# Patient Record
Sex: FEMALE | Race: WHITE | NOT HISPANIC OR LATINO | URBAN - METROPOLITAN AREA
[De-identification: names, ages, dates, MRNs, and addresses within clinical notes are randomized per-mention and may not be internally consistent; named-entity substitution may affect disease eponyms.]

---

## 2023-09-28 ENCOUNTER — HOSPITAL ENCOUNTER (OUTPATIENT)
Dept: DATA CONVERSION | Facility: HOSPITAL | Age: 78
Discharge: HOME | End: 2023-09-28

## 2023-09-28 DIAGNOSIS — Z12.31 ENCOUNTER FOR SCREENING MAMMOGRAM FOR MALIGNANT NEOPLASM OF BREAST: ICD-10-CM

## 2024-08-02 ENCOUNTER — HOSPITAL ENCOUNTER (OUTPATIENT)
Dept: RADIOLOGY | Facility: HOSPITAL | Age: 79
Discharge: HOME | End: 2024-08-02
Payer: COMMERCIAL

## 2024-08-02 VITALS — HEIGHT: 59 IN | BODY MASS INDEX: 26.41 KG/M2 | WEIGHT: 131 LBS

## 2024-08-02 DIAGNOSIS — N63.11 UNSPECIFIED LUMP IN THE RIGHT BREAST, UPPER OUTER QUADRANT: ICD-10-CM

## 2024-08-02 PROCEDURE — 77062 BREAST TOMOSYNTHESIS BI: CPT

## 2024-08-02 PROCEDURE — 76642 ULTRASOUND BREAST LIMITED: CPT | Mod: 50

## 2024-08-02 PROCEDURE — 76983 USE EA ADDL TARGET LESION: CPT

## 2024-08-07 NOTE — PROGRESS NOTES
Subjective   Patient ID: Ayana Hernandez is a 79 y.o. female who presents for breast biopsy consult 8/8/2024.  HPI  Patient is new to clinic and presents for Bilateral US guided breast biopsy 8/8/2024.  Patient indicates she had a palpable lump in her right breast for years.  Recently however she thinks it is increased in size.  She noticed something in the mirror when she looked at her breast and subsequently called.  Patient did not feel any lumps in her left breast until after the imaging.  Patient has a history of a right breast biopsy which sounds like an excisional biopsy in the year 2000 for benign disease.  Patient has a history of left breast abscess in 1993 that was taken care of as well.  Patient denies any nipple discharge.  Patient had a maternal great grandmother had ovarian cancer in her 80s.  BI MAMMO BILATERAL DIAGNOSTIC TOMOSYNTHESIS; BI US BREAST LIMITED  BILATERAL;  8/2/2024 1:06 pm; 8/2/2024 2:26 pm      ACCESSION NUMBER(S):  [id number]; [id number]      ORDERING CLINICIAN:  CARMEN BARBER      INDICATION:  Signs/Symptoms:DIAG BILAT MAMMO W ZOHRA; Signs/Symptoms:US BREAST.  Palpable lump right breast upper-outer quadrant. Area of concern is  demarcated with an external skin marker.      COMPARISON:  2023, 2022, 2021, 2020, 2018      FINDINGS:  2D and tomosynthesis images were reviewed at 1 mm slice thickness.      Density:  The breast tissue is heterogeneously dense, which may  obscure small masses.      Right Breast :  *There is a subtle area of architectural distortion corresponding  with the palpable area of concern at the 12 o'clock position right  breast anterior depth.          Left Breast  :  *Coarse benign calcifications are scattered throughout the left  breast. There is a developing cluster of coarse calcifications within  the comp of dense tissue upper-outer quadrant left breast mid depth.  There is questionable increasing tissue density in this distribution  as well.               ULTRASOUND FINDINGS:  Patient was scanned both by the technologist as well as myself.  Right Breast :  *The area of palpable concern corresponds with an taller than wide  area of acoustic shadowing, with marked increased stiffness on  elastography vascular flow along the anterior margin. The most dense  area measures 0.8 x 0.7 x 1.2 cm although the upon further scanning  of this area may be extension to much broader area measuring up to  1.5 x 3.2 cm. This area of concern lies at the 12 o'clock position 4  cm from the nipple. *Axillary lymph nodes on the right are normal.          Left Breast  :  *The area of concern upper-outer quadrant left breast 7 cm from the  nipple at the 1 o'clock position corresponds with a macrolobulated  heterogeneous mass with increased stiffness on elastography,  demonstrating some internal vascularity along with the coarse  calcifications noted mammographically. This area measures  approximately 1.2 x 2.0 x 2.7 cm. *Scanning of the axilla  demonstrates normal lymph nodes without lymphadenopathy.           Impression   Bilateral breast biopsy is recommended for the areas of concern  described above, palpable on the right at the 12 o'clock position and  within the upper-outer quadrant on the left at the 1 o'clock  position. Need for biopsy was discussed with the patient at the time  of the exam.      BI-RADS CATEGORY:      BI-RADS CATEGORY:  5 Highly Suggestive of Malignancy.  Recommendation:  Surgical Consultation and Biopsy.  Recommended Date:  Immediate.  Laterality:  Bilateral.      For any future breast imaging appointments, please call [phone]).          MACRO:  Critical Finding:  See findings. Notification was initiated on  8/2/2024 at 3:01 pm by  Cecil Kyle.  (**-[facility]-**) Instructions:  Surgical Consultation and Imaging Guided Biopsy.     Previous Biopsy: yes Menarche Age: 14 Age of first delivery: 21 Breastfeed: no Menopause age: 50's HRT: possibly- can't remember  "Family history of breast cancer: No Family history of ovarian cancer: Yes Family history of pancreatic cancer: No G 3 P 2    Social History:  Tobacco Use - former 30 years  Do you use any recreational drugs - no  Alcohol Use - 2-3 daily  Caffeine Intake - 2 cups coffee daily  Working - full time  Marital status -   Living with - spouse    Review of Systems    Objective   Physical Exam  Physical Exam:  BP (!) 176/104   Pulse 104   Temp 36.5 °C (97.7 °F)   Ht 1.499 m (4' 11\")   Wt 60.7 kg (133 lb 12.8 oz)   SpO2 98%   BMI 27.02 kg/m²     GENERAL APPEARANCE: Patient appears in no acute distress.   EYES: Sclera non-icteric, PERRLA.   ENT Normal appearance of ears and nose.   NECK/THYROID: Neck: no masses. Thyroid: no masses.   LYMPH NODES: No cervical or supraclavicular lymphadenopathy.   CARDIOVASCULAR Heart: RRR, no murmurs; Carotid bruits: none; Peripheral edema: none.   RESPIRATORY: Lungs: Bilateral inspiratory and expiratory wheezing; no respiratory distress.   BREASTS: Exam done both in upright and supine position. On inspection no skin dimpling, no peau d'orange, with arms in the upright overhead position I cannot appreciate a lump in the right breast in the area of concern.  Masses: Patient with palpable mass right breast 11 to 12 o'clock position.  Somewhat mobile nontender no axillary lymphadenopathy.  Patient with palpable mass left breast 1 o'clock position higher up in the axillary tail.  It is also very mobile and nontender.  No x-ray lymphadenopathy here.  GI (ABDOMEN) No intraabdominal mass appreciated, no hepatosplenomegaly; Hernia: none; Tenderness: none.   PSYCH: Patient oriented to time, place and person, normal affect.    Assessment/Plan   Problem List Items Addressed This Visit             ICD-10-CM    Mass overlapping multiple quadrants of right breast - Primary N63.15     Patient with palpable lesion right breast 12 o'clock position initially appeared to be a 1.2 cm but on further " scanning may actually be an area of 3.2 cm.  Recommend ultrasound-guided biopsy.Patient to be scheduled for ultrasound guided  biopsy of the breast in the radiology department. Risk, benefits and alternatives to this plan were thoroughly discussed with the patient who agrees to proceed.  The procedure was also thoroughly discussed as well as her follow-up. She is to see me in the office in one week but I also will call as soon as I see the pathology which is usually 4 working days from procedure.   An extended period of time was spent with the patient and her  concerning her history her liver lack history as well as her severe anxiety when it comes to undergoing a biopsy.  I talked a significant amount of time just to talk her into the biopsy.         Mass of upper outer quadrant of left breast N63.21     Patient with a 2.7 cm radiographic abnormality left breast.  Recommend ultrasound-guided biopsy.Patient to be scheduled for ultrasound guided  biopsy of the breast in the radiology department. Risk, benefits and alternatives to this plan were thoroughly discussed with the patient who agrees to proceed.  The procedure was also thoroughly discussed as well as her follow-up. She is to see me in the office in one week but I also will call as soon as I see the pathology which is usually 4 working days from procedure.          Dense breast tissue on mammogram R92.30     Patient will ultimately require MRI for breast evaluation after biopsies.         Anxiety F41.9     Patient with significant amount of time spent discussing the biopsy and talking her into it.  She has severe anxiety with local numbing medicine.  Agreed to give her Valium to help.  She does have a .                 Dayan Hawthorne MD 08/08/24 10:42 AM

## 2024-08-08 ENCOUNTER — HOSPITAL ENCOUNTER (OUTPATIENT)
Dept: RADIOLOGY | Facility: HOSPITAL | Age: 79
Discharge: HOME | End: 2024-08-08
Payer: COMMERCIAL

## 2024-08-08 ENCOUNTER — OFFICE VISIT (OUTPATIENT)
Dept: SURGERY | Facility: CLINIC | Age: 79
End: 2024-08-08
Payer: COMMERCIAL

## 2024-08-08 VITALS
OXYGEN SATURATION: 98 % | HEART RATE: 104 BPM | TEMPERATURE: 97.7 F | HEIGHT: 59 IN | BODY MASS INDEX: 26.97 KG/M2 | SYSTOLIC BLOOD PRESSURE: 176 MMHG | WEIGHT: 133.8 LBS | DIASTOLIC BLOOD PRESSURE: 104 MMHG

## 2024-08-08 DIAGNOSIS — R92.333 HETEROGENEOUSLY DENSE TISSUE OF BOTH BREASTS ON MAMMOGRAPHY: ICD-10-CM

## 2024-08-08 DIAGNOSIS — N63.15 MASS OVERLAPPING MULTIPLE QUADRANTS OF RIGHT BREAST: Primary | ICD-10-CM

## 2024-08-08 DIAGNOSIS — N63.0 BREAST MASS: ICD-10-CM

## 2024-08-08 DIAGNOSIS — N63.21 MASS OF UPPER OUTER QUADRANT OF LEFT BREAST: ICD-10-CM

## 2024-08-08 PROBLEM — R92.30 DENSE BREAST TISSUE ON MAMMOGRAM: Status: ACTIVE | Noted: 2024-08-08

## 2024-08-08 PROBLEM — F41.9 ANXIETY: Status: ACTIVE | Noted: 2024-08-08

## 2024-08-08 PROBLEM — F41.9 ANXIETY: Status: RESOLVED | Noted: 2024-08-08 | Resolved: 2024-08-08

## 2024-08-08 PROCEDURE — 99214 OFFICE O/P EST MOD 30 MIN: CPT | Performed by: SURGERY

## 2024-08-08 PROCEDURE — 1159F MED LIST DOCD IN RCRD: CPT | Performed by: SURGERY

## 2024-08-08 PROCEDURE — 19083 BX BREAST 1ST LESION US IMAG: CPT | Mod: RT

## 2024-08-08 PROCEDURE — 1036F TOBACCO NON-USER: CPT | Performed by: SURGERY

## 2024-08-08 PROCEDURE — A4648 IMPLANTABLE TISSUE MARKER: HCPCS

## 2024-08-08 PROCEDURE — 19083 BX BREAST 1ST LESION US IMAG: CPT | Mod: LT

## 2024-08-08 PROCEDURE — 2720000007 HC OR 272 NO HCPCS

## 2024-08-08 PROCEDURE — 2780000003 HC OR 278 NO HCPCS

## 2024-08-08 PROCEDURE — 1126F AMNT PAIN NOTED NONE PRSNT: CPT | Performed by: SURGERY

## 2024-08-08 PROCEDURE — 77065 DX MAMMO INCL CAD UNI: CPT

## 2024-08-08 PROCEDURE — 99204 OFFICE O/P NEW MOD 45 MIN: CPT | Performed by: SURGERY

## 2024-08-08 PROCEDURE — 2500000005 HC RX 250 GENERAL PHARMACY W/O HCPCS: Performed by: RADIOLOGY

## 2024-08-08 RX ORDER — LIDOCAINE HYDROCHLORIDE 10 MG/ML
INJECTION, SOLUTION EPIDURAL; INFILTRATION; INTRACAUDAL; PERINEURAL
Status: COMPLETED | OUTPATIENT
Start: 2024-08-08 | End: 2024-08-08

## 2024-08-08 RX ORDER — AMLODIPINE BESYLATE 5 MG/1
5 TABLET ORAL DAILY
COMMUNITY
Start: 2013-07-17

## 2024-08-08 RX ORDER — CYANOCOBALAMIN (VITAMIN B-12) 250 MCG
250 TABLET ORAL DAILY
COMMUNITY

## 2024-08-08 RX ORDER — VITAMIN E 268 MG
1 CAPSULE ORAL EVERY 24 HOURS
COMMUNITY

## 2024-08-08 RX ORDER — LEVOTHYROXINE SODIUM 50 UG/1
50 TABLET ORAL DAILY
COMMUNITY

## 2024-08-08 ASSESSMENT — PATIENT HEALTH QUESTIONNAIRE - PHQ9
1. LITTLE INTEREST OR PLEASURE IN DOING THINGS: NOT AT ALL
SUM OF ALL RESPONSES TO PHQ9 QUESTIONS 1 AND 2: 0
2. FEELING DOWN, DEPRESSED OR HOPELESS: NOT AT ALL

## 2024-08-08 ASSESSMENT — PAIN SCALES - GENERAL
PAINLEVEL_OUTOF10: 0 - NO PAIN
PAINLEVEL: 0-NO PAIN

## 2024-08-08 NOTE — ASSESSMENT & PLAN NOTE
Patient with significant amount of time spent discussing the biopsy and talking her into it.  She has severe anxiety with local numbing medicine.  Agreed to give her Valium to help.  She does have a .

## 2024-08-08 NOTE — ASSESSMENT & PLAN NOTE
Patient with palpable lesion right breast 12 o'clock position initially appeared to be a 1.2 cm but on further scanning may actually be an area of 3.2 cm.  Recommend ultrasound-guided biopsy.Patient to be scheduled for ultrasound guided  biopsy of the breast in the radiology department. Risk, benefits and alternatives to this plan were thoroughly discussed with the patient who agrees to proceed.  The procedure was also thoroughly discussed as well as her follow-up. She is to see me in the office in one week but I also will call as soon as I see the pathology which is usually 4 working days from procedure.   x

## 2024-08-08 NOTE — ASSESSMENT & PLAN NOTE
Patient with palpable lesion left breast in the area of concern as well.  Patient with a 2.7 cm radiographic abnormality left breast.  Recommend ultrasound-guided biopsy.Patient to be scheduled for ultrasound guided  biopsy of the breast in the radiology department. Risk, benefits and alternatives to this plan were thoroughly discussed with the patient who agrees to proceed.  The procedure was also thoroughly discussed as well as her follow-up. She is to see me in the office in one week but I also will call as soon as I see the pathology which is usually 4 working days from procedure.

## 2024-08-15 LAB
LAB AP ASR DISCLAIMER: NORMAL
LABORATORY COMMENT REPORT: NORMAL
PATH REPORT.ADDENDUM SPEC: NORMAL
PATH REPORT.COMMENTS IMP SPEC: NORMAL
PATH REPORT.FINAL DX SPEC: NORMAL
PATH REPORT.GROSS SPEC: NORMAL
PATH REPORT.TOTAL CANCER: NORMAL

## 2024-08-20 ENCOUNTER — TELEPHONE (OUTPATIENT)
Dept: SURGERY | Facility: CLINIC | Age: 79
End: 2024-08-20
Payer: COMMERCIAL

## 2024-08-20 DIAGNOSIS — C50.919 ADENOCARCINOMA OF BREAST, UNSPECIFIED LATERALITY (MULTI): Primary | ICD-10-CM

## 2024-08-22 NOTE — H&P (VIEW-ONLY)
Subjective   Patient ID: Ayana Hernandez is a 79 y.o. female who presents for follow up breast biopsy results.  HPI  Patient returns to clinic for follow up Bilateral US guided breast biopsies on 2024.  FINAL DIAGNOSIS   A. Left breast, 1:00, 7 cm from nipple, ultrasound guided core needle biopsy:  -- Minute focus of invasive carcinoma with ductal features, see note.     Note: Immunostains for p63 and SMMHC show absent myoepithelial cell layer in the focus of invasion, supporting the diagnosis. In this limited sample, the invasive carcinoma measures up to 1 mm in greatest dimension. ER, NC and HER2 will be reported in an addendum.     B. Right breast 12:00, 4 cm from nipple, ultrasound guided core needle biopsy:  -- Invasive lobular carcinoma, grade 2, see note.     Note: Immunostain for cytokeratin AE 1/3 highlights the tumor cells. P63 and SMMHC show absent myoepithelial cell layer in the focus of invasion while E-cadherin shows reduced/absent membraneous staining, supporting lobular phenotype.  In this limited sample, the invasive carcinoma measures up to 1.7 mm in greatest dimension.  ER, NC and HER2 will be reported in an addendum.     Addendum   Surgical/Block Number:[id number]  Specimen Site:Left breast 1:00 7cm from nipple  Specimen Type:core needle biopsy        Estrogen Receptor (clone SP1):                     Positive, SEE COMMENT                                                                          Percentage with nuclear staining: >95%                                                                          Intensity of staining: Strong     Progesterone Receptor (clone SP2): Positive, SEE COMMENT  Percentage with nuclear stainin%                                                                          Intensity of staining: Moderate- Strong     HER2 (clone 4B5):                                                                                                                    Equivocal  (2+).  PAOLA is pending and will be reported in addendum. SEE COMMENT                                                                                                                  Comment:   Internal positive controls were identified in this specimen.   Ischemic time is not provided for this sample.   Analysis was performed on a minute focus of invasive carcinoma. Consider repeating the receptors on the excision specimen.     For ER: Ranges for interpretation: Invasive carcinoma cells exhibiting greater than or equal to 10% nuclear staining are considered POSITIVE. Invasive carcinoma cells exhibiting less than 10%, but greater than or equal to 1% are considered LOW POSITIVE. Invasive carcinoma cells exhibiting less than 1% staining are considered NEGATIVE. (Reference: Arch Pathol Lab Med. doi:10.5858/arpa. [id number]) The stated steroid receptor activity for ER was derived from rabbit monoclonal antibody staining (clone SP1) on formalin fixed, paraffin embedded specimens, unless otherwise noted.  Each assay is performed using appropriate positive and negative internal controls.     For MI: Ranges for interpretation: Invasive carcinoma cells exhibiting greater than or equal to 1% nuclear staining are considered POSITIVE.  Invasive carcinoma cells exhibiting less than 1% staining are considered NEGATIVE. (Reference: Arch Pathol Lab Med. doi:10.5858/arpa. 8459-1049-5B) The stated steroid receptor activity for MI was derived from rabbit monoclonal antibody staining (clone 1E2) on formalin fixed, paraffin embedded specimens, unless otherwise noted.  The method employed was a standard peroxidase labeled polymer detection system. Each assay is performed using appropriate positive and negative internal controls.     For HER2: Ranges for interpretation: POSITIVE (3+): greater than or equal to 10% tumor cells with intense and uniform staining; EQUIVOCAL (2+): weak to moderate complete immunoreactivity in >10% of tumor  cells or circumferential intense staining in less than or equal to 10% of cells; and NEGATIVE (1+): Faint weak immunoreactivity in >10% of tumor cells, but only a portion of the membrane is positive; NEGATIVE (0):No immunoreactivity or immunoreactivity in less than or equal to 10% of tumor cells. All tests are performed using a [place] [employer] HER-2/nav (4B5) rabbit monoclonal primary antibody on formalin fixed, paraffin embedded tissue, unless otherwise noted. Only invasive carcinoma is evaluated using the ASCO/CAP scoring system (Arch Pathol Lab Med 2018; 142: 3884-1985) unless otherwise specified.  External cell culture and tissue controls stain appropriately.    Addendum electronically signed by Judy Merrill MD on 8/13/2024 at 1556   Addendum   Surgical/Block Number:[id number]  Specimen Site:Right breast 12:00, 4 cm from nipple  Specimen Type:core needle biopsy        Estrogen Receptor (clone SP1):                     Positive                                                                           Percentage with nuclear staining: >95%                                                                          Intensity of staining: Strong     Progesterone Receptor (clone SP2):Negative        HER2 (clone 4B5):                                                                                                                    Negative (1+)                                                                            Comment:   Internal positive controls were identified in this specimen.   Ischemic times are not provided for this sample.        For ER: Ranges for interpretation: Invasive carcinoma cells exhibiting greater than or equal to 10% nuclear staining are considered POSITIVE. Invasive carcinoma cells exhibiting less than 10%, but greater than or equal to 1% are considered LOW POSITIVE. Invasive carcinoma cells exhibiting less than 1% staining are considered NEGATIVE. (Reference: Arch Pathol Lab Med.  doi:10.5858/arpa. [id number]) The stated steroid receptor activity for ER was derived from rabbit monoclonal antibody staining (clone SP1) on formalin fixed, paraffin embedded specimens, unless otherwise noted.  Each assay is performed using appropriate positive and negative internal controls.     For RI: Ranges for interpretation: Invasive carcinoma cells exhibiting greater than or equal to 1% nuclear staining are considered POSITIVE.  Invasive carcinoma cells exhibiting less than 1% staining are considered NEGATIVE. (Reference: Arch Pathol Lab Med. doi:10.5858/arpa. 6031-3171-4Y) The stated steroid receptor activity for RI was derived from rabbit monoclonal antibody staining (clone 1E2) on formalin fixed, paraffin embedded specimens, unless otherwise noted.  The method employed was a standard peroxidase labeled polymer detection system. Each assay is performed using appropriate positive and negative internal controls.     For HER2: Ranges for interpretation: POSITIVE (3+): greater than or equal to 10% tumor cells with intense and uniform staining; EQUIVOCAL (2+): weak to moderate complete immunoreactivity in >10% of tumor cells or circumferential intense staining in less than or equal to 10% of cells; and NEGATIVE (1+): Faint weak immunoreactivity in >10% of tumor cells, but only a portion of the membrane is positive; NEGATIVE (0):No immunoreactivity or immunoreactivity in less than or equal to 10% of tumor cells. All tests are performed using a [place] Pathway HER-2/nav (4B5) rabbit monoclonal primary antibody on formalin fixed, paraffin embedded tissue, unless otherwise noted. Only invasive carcinoma is evaluated using the ASCO/CAP scoring system (Arch Pathol Lab Med 2018; 142: 4906-0688) unless otherwise specified.  External cell culture and tissue controls stain appropriately.    Addendum electronically signed by Judy Merrill MD on 8/13/2024 at 1601   Addendum   HER2 DUAL PAOLA FOR DETECTION OF HER2 GENE  AMPLIFICATION  HER2 Dual PAOLA DNA Probe Cocktail Assay from [employer] (Roche)    Results are expressed as the averaged ratio HER2/chromosome 17 signals in 20 nuclei.        Source of Specimen: Left breast core needle biopsy  Paraffin Block: [id number] A2  Duration of fixation:  unless otherwise noted, 6-72 hours fixation     TEST RESULTS:    Number of tumor cells counted:  20  Number of observers: 1  Average number of HER2 signals/nucleus:  3.2  Average number of CEP 17 signals/nucleus: 1.8  Ratio of average HER2/CEP 17:  1.7     INTERPRETATION:       Negative (Not amplified) SEE COMMENT                                              COMMENT: Analysis was performed on a limited focus of invasion, Consider repeating the receptors on the excision specimen.     NOTE:   Patient's with a HER2/CEP 17 Dual PAOLA ratio of greater than or equal to 2.0 were considered eligible for treatment in the adjuvant trastuzumab trials.  (References:  Goran et al.  Breast Cancer Res Treat 94:S5, 2005 (Supp 1; Abs1); Marcellus et al, N Engl J Med 353:  6693-0072; catrachito Sunshine al, N Engl J Med 353:  6172-8385, 2005; and JOHNNY trial study, presented as late breaking abstract at 42nd Annual Meeting of the American Society of Clinical Oncology, [place], [place], J Clin Oncol 24, 2006) Lora et al J. Clin Oncol;  Recommendations for human epidermal growth factor receptor 2 testing in breast cancer: American Society of Clinical Oncology/College of American Pathologists clinical practice guideline update 2018.        REFERENCE RANGES:  Ratio <2.0 and average HER2 signal <4.0: Negative (non-amplified)  Ratio >=2.0 and average HER2 signal >=4.0: Positive (amplified)     Control results:  External (amplified, equivocal, non-amplified) and internal controls perform as expected.     INFORM HER2 Dual PAOLA DNA Probe Cocktail Assay from [employer] ([employer] English) is intended to determine HER2 gene status by enumeration of the  ratio of the HER2 gene to Chromosome 17. The HER2 and Chromosome 17 probes are detected using two color chromogenic in situ hybridization (PAOLA) in formalin-fixed, paraffin-embedded human breast cancer and gastroesophageal tissue specimens following staining on [employer] BenchMark ULTRA automated slide stainer (using [employer] software), by light microscopy. The INFORM HER2 Dual PAOLA DNA Probe Cocktail is indicated as an aid in the assessment of patients for whom Herceptin (trastuzumab) treatment is being considered.    This assay was interpreted by a qualified reader in conjunction with histological examination, relevant clinical information, and proper controls.    This reagent is intended for in vitro diagnostic (IVD) use.     One or more of the reagents used to perform assays on this specimen MAY have contained components considered to be analyte specific reagents (ASR's).  ASR's have not been cleared or approved by the U.S.Food and Drug Administration.  These assays were developed and their performance characteristics determined by the Department of Pathology at [facility].  The assays were performed with appropriate positive and negative controls which stained appropriately.    Addendum electronically signed by Judy Merrill MD on 8/15/2024 at 1025       Social History:  Tobacco Use - former 30 years  Do you use any recreational drugs - no  Alcohol Use - 2-3 daily  Caffeine Intake - 2 cups coffee daily  Working - full time  Marital status -   Living with - spouse    Past Medical History:   Diagnosis Date   • Encounter for screening mammogram for malignant neoplasm of breast     Visit for screening mammogram   • Personal history of other diseases of the musculoskeletal system and connective tissue     History of osteopenia     Past Surgical History:   Procedure Laterality Date   • ABSCESS DRAINAGE Right     breast drained by dr. almeida   • BREAST BIOPSY Bilateral 08/08/2024   • BREAST  "LUMPECTOMY Right 2000    benign breast lump   • COLONOSCOPY  05/20/2013    Complete Colonoscopy   • KNEE SURGERY  05/20/2013    Knee Surgery Left   • KNEE SURGERY  05/20/2013    Knee Surgery Right   • NOSE SURGERY     • TONSILLECTOMY  05/20/2013    Tonsillectomy     Family History   Problem Relation Name Age of Onset   • Lung cancer Mother     • Lung cancer Father       Allergies   Allergen Reactions   • Egg Other     STOMACH CRAMPS    • Fentanyl Other     Drops blood pressure   • Morphine Nausea/vomiting   • Penicillins Anaphylaxis   • Lisinopril Other   • Tetracyclines GI Upset       Review of Systems  /79   Pulse 80   Temp 36.8 °C (98.2 °F)   Resp 17   Ht 1.499 m (4' 11\")   Wt 60.3 kg (133 lb)   SpO2 97%   BMI 26.86 kg/m²     Objective   Physical Exam  Bilateral breast biopsies healng nicely  Assessment/Plan   Problem List Items Addressed This Visit             ICD-10-CM    Adenocarcinoma of right breast (Multi) C50.911     Patient with bilateral breast cancer.  Left side showing 1 mm intraductal carcinoma ER/[place] positive HER2/nav negative this on imaging measures 2.2 cm but on pathology only measures 1 mm of tumor.  In addition patient has an infiltrating lobular carcinoma found on the right side 12 o'clock position this measures approximately 2 cm on imaging.  This is ER positive [place] HER2/nav negative. Recommend bilateral magseed  localization and lumpectomy with sentinal node biopsy verses mastectomy +/- reconstruction with sentinal node biopsy. Approximately 40 minutes spent with patient discussing her tumor, its characteristics and her treatment options. She will ultimately meet with oncology post surgery, and possibly radiation oncology depending on her surgical treatment choice and its outcome.  Risks benefits and alternatives to surgery were thoroughly discussed with the patient who agrees to proceed., Schedule at [facility].  Patient to have one more follow-up visit prior to surgery date to " solidfy surgical plan and answer any questions and to review labs and xrays ordered.   Recommending MRI of the breast due to bilateral breast cancer.         Adenocarcinoma of left breast (Multi) - Primary C50.912     Patient with bilateral breast cancer.  Left side showing 1 mm intraductal carcinoma ER/[place] positive HER2/nav negative this on imaging measures 2.2 cm but on pathology only measures 1 mm of tumor.  In addition patient has an infiltrating lobular carcinoma found on the right side 12 o'clock position this measures approximately 2 cm on imaging.  This is ER positive [place] HER2/nav negative. Recommend bilateral magseed  localization and lumpectomy with sentinal node biopsy verses mastectomy +/- reconstruction with sentinal node biopsy. Approximately 40 minutes spent with patient discussing her tumor, its characteristics and her treatment options. She will ultimately meet with oncology post surgery, and possibly radiation oncology depending on her surgical treatment choice and its outcome.  Risks benefits and alternatives to surgery were thoroughly discussed with the patient who agrees to proceed., Schedule at [facility].  Patient to have one more follow-up visit prior to surgery date to solidfy surgical plan and answer any questions and to review labs and xrays ordered.   Recommending MRI of the breast due to bilateral breast cancer.          Breast History:  Patient is new to clinic and presents for Bilateral US guided breast biopsy 8/8/2024.  Patient indicates she had a palpable lump in her right breast for years.  Recently however she thinks it is increased in size.  She noticed something in the mirror when she looked at her breast and subsequently called.  Patient did not feel any lumps in her left breast until after the imaging.  Patient has a history of a right breast biopsy which sounds like an excisional biopsy in the year 2000 for benign disease.  Patient has a history of left breast abscess in 1993 that  was taken care of as well.  Patient denies any nipple discharge.  Patient had a maternal great grandmother had ovarian cancer in her 80s.  BI MAMMO BILATERAL DIAGNOSTIC TOMOSYNTHESIS; BI US BREAST LIMITED  BILATERAL;  8/2/2024 1:06 pm; 8/2/2024 2:26 pm      ACCESSION NUMBER(S):  [id number]; [id number]      ORDERING CLINICIAN:  CARMEN BARBER      INDICATION:  Signs/Symptoms:DIAG BILAT MAMMO W ZOHRA; Signs/Symptoms:US BREAST.  Palpable lump right breast upper-outer quadrant. Area of concern is  demarcated with an external skin marker.      COMPARISON:  2023, 2022, 2021, 2020, 2018      FINDINGS:  2D and tomosynthesis images were reviewed at 1 mm slice thickness.      Density:  The breast tissue is heterogeneously dense, which may  obscure small masses.      Right Breast :  *There is a subtle area of architectural distortion corresponding  with the palpable area of concern at the 12 o'clock position right  breast anterior depth.          Left Breast  :  *Coarse benign calcifications are scattered throughout the left  breast. There is a developing cluster of coarse calcifications within  the comp of dense tissue upper-outer quadrant left breast mid depth.  There is questionable increasing tissue density in this distribution  as well.              ULTRASOUND FINDINGS:  Patient was scanned both by the technologist as well as myself.  Right Breast :  *The area of palpable concern corresponds with an taller than wide  area of acoustic shadowing, with marked increased stiffness on  elastography vascular flow along the anterior margin. The most dense  area measures 0.8 x 0.7 x 1.2 cm although the upon further scanning  of this area may be extension to much broader area measuring up to  1.5 x 3.2 cm. This area of concern lies at the 12 o'clock position 4  cm from the nipple. *Axillary lymph nodes on the right are normal.          Left Breast  :  *The area of concern upper-outer quadrant left breast 7 cm from the  nipple  at the 1 o'clock position corresponds with a macrolobulated  heterogeneous mass with increased stiffness on elastography,  demonstrating some internal vascularity along with the coarse  calcifications noted mammographically. This area measures  approximately 1.2 x 2.0 x 2.7 cm. *Scanning of the axilla  demonstrates normal lymph nodes without lymphadenopathy.           Impression   Bilateral breast biopsy is recommended for the areas of concern  described above, palpable on the right at the 12 o'clock position and  within the upper-outer quadrant on the left at the 1 o'clock  position. Need for biopsy was discussed with the patient at the time  of the exam.      BI-RADS CATEGORY:      BI-RADS CATEGORY:  5 Highly Suggestive of Malignancy.  Recommendation:  Surgical Consultation and Biopsy.  Recommended Date:  Immediate.  Laterality:  Bilateral.      For any future breast imaging appointments, please call [phone]).          MACRO:  Critical Finding:  See findings. Notification was initiated on  8/2/2024 at 3:01 pm by  Cecil Kyle.  (**-[facility]-**) Instructions:  Surgical Consultation and Imaging Guided Biopsy.    Patient with palpable lesion right breast 12 o'clock position initially appeared to be a 1.2 cm but on further scanning may actually be an area of 3.2 cm.  Recommend ultrasound-guided biopsy.Patient to be scheduled for ultrasound guided  biopsy of the breast in the radiology department. Risk, benefits and alternatives to this plan were thoroughly discussed with the patient who agrees to proceed.  The procedure was also thoroughly discussed as well as her follow-up. She is to see me in the office in one week but I also will call as soon as I see the pathology which is usually 4 working days from procedure.   An extended period of time was spent with the patient and her  concerning her history her liver lack history as well as her severe anxiety when it comes to undergoing a biopsy.  I talked a  significant amount of time just to talk her into the biopsy.    Patient with a 2.7 cm radiographic abnormality left breast.  Recommend ultrasound-guided biopsy.Patient to be scheduled for ultrasound guided  biopsy of the breast in the radiology department. Risk, benefits and alternatives to this plan were thoroughly discussed with the patient who agrees to proceed.  The procedure was also thoroughly discussed as well as her follow-up. She is to see me in the office in one week but I also will call as soon as I see the pathology which is usually 4 working days from procedure.      Previous Biopsy: yes Menarche Age: 14 Age of first delivery: 21 Breastfeed: no Menopause age: 50's HRT: possibly- can't remember Family history of breast cancer: No Family history of ovarian cancer: Yes Family history of pancreatic cancer: No G 3 P 2       Dayan Hawthorne MD 08/27/24 12:15 PM

## 2024-08-22 NOTE — PROGRESS NOTES
Pharmacy faxed in a request for prior authorization on:    Medication: Relion NovolinR flexpen  Dosage: 100 unit/ml  Quantity requested:  15  Pharmacy for prescription has been selected.    Initiation of prior authorization needed.   Subjective   Patient ID: Ayana Hernandez is a 79 y.o. female who presents for follow up breast biopsy results.  HPI  Patient returns to clinic for follow up Bilateral US guided breast biopsies on 2024.  FINAL DIAGNOSIS   A. Left breast, 1:00, 7 cm from nipple, ultrasound guided core needle biopsy:  -- Minute focus of invasive carcinoma with ductal features, see note.     Note: Immunostains for p63 and SMMHC show absent myoepithelial cell layer in the focus of invasion, supporting the diagnosis. In this limited sample, the invasive carcinoma measures up to 1 mm in greatest dimension. ER, VT and HER2 will be reported in an addendum.     B. Right breast 12:00, 4 cm from nipple, ultrasound guided core needle biopsy:  -- Invasive lobular carcinoma, grade 2, see note.     Note: Immunostain for cytokeratin AE 1/3 highlights the tumor cells. P63 and SMMHC show absent myoepithelial cell layer in the focus of invasion while E-cadherin shows reduced/absent membraneous staining, supporting lobular phenotype.  In this limited sample, the invasive carcinoma measures up to 1.7 mm in greatest dimension.  ER, VT and HER2 will be reported in an addendum.     Addendum   Surgical/Block Number:[id number]  Specimen Site:Left breast 1:00 7cm from nipple  Specimen Type:core needle biopsy        Estrogen Receptor (clone SP1):                     Positive, SEE COMMENT                                                                          Percentage with nuclear staining: >95%                                                                          Intensity of staining: Strong     Progesterone Receptor (clone SP2): Positive, SEE COMMENT  Percentage with nuclear stainin%                                                                          Intensity of staining: Moderate- Strong     HER2 (clone 4B5):                                                                                                                    Equivocal  (2+).  PAOLA is pending and will be reported in addendum. SEE COMMENT                                                                                                                  Comment:   Internal positive controls were identified in this specimen.   Ischemic time is not provided for this sample.   Analysis was performed on a minute focus of invasive carcinoma. Consider repeating the receptors on the excision specimen.     For ER: Ranges for interpretation: Invasive carcinoma cells exhibiting greater than or equal to 10% nuclear staining are considered POSITIVE. Invasive carcinoma cells exhibiting less than 10%, but greater than or equal to 1% are considered LOW POSITIVE. Invasive carcinoma cells exhibiting less than 1% staining are considered NEGATIVE. (Reference: Arch Pathol Lab Med. doi:10.5858/arpa. 1116-5001-7V) The stated steroid receptor activity for ER was derived from rabbit monoclonal antibody staining (clone SP1) on formalin fixed, paraffin embedded specimens, unless otherwise noted.  Each assay is performed using appropriate positive and negative internal controls.     For [place]: Ranges for interpretation: Invasive carcinoma cells exhibiting greater than or equal to 1% nuclear staining are considered POSITIVE.  Invasive carcinoma cells exhibiting less than 1% staining are considered NEGATIVE. (Reference: Arch Pathol Lab Med. doi:10.5858/arpa. [id number]) The stated steroid receptor activity for [place] was derived from rabbit monoclonal antibody staining (clone 1E2) on formalin fixed, paraffin embedded specimens, unless otherwise noted.  The method employed was a standard peroxidase labeled polymer detection system. Each assay is performed using appropriate positive and negative internal controls.     For HER2: Ranges for interpretation: POSITIVE (3+): greater than or equal to 10% tumor cells with intense and uniform staining; EQUIVOCAL (2+): weak to moderate complete immunoreactivity in >10% of tumor  cells or circumferential intense staining in less than or equal to 10% of cells; and NEGATIVE (1+): Faint weak immunoreactivity in >10% of tumor cells, but only a portion of the membrane is positive; NEGATIVE (0):No immunoreactivity or immunoreactivity in less than or equal to 10% of tumor cells. All tests are performed using a [place] Pathway HER-2/nav (4B5) rabbit monoclonal primary antibody on formalin fixed, paraffin embedded tissue, unless otherwise noted. Only invasive carcinoma is evaluated using the ASCO/CAP scoring system (Arch Pathol Lab Med 2018; 142: 5866-4693) unless otherwise specified.  External cell culture and tissue controls stain appropriately.    Addendum electronically signed by Judy Merrill MD on 8/13/2024 at 1556   Addendum   Surgical/Block Number:[id number]  Specimen Site:Right breast 12:00, 4 cm from nipple  Specimen Type:core needle biopsy        Estrogen Receptor (clone SP1):                     Positive                                                                           Percentage with nuclear staining: >95%                                                                          Intensity of staining: Strong     Progesterone Receptor (clone SP2):Negative        HER2 (clone 4B5):                                                                                                                    Negative (1+)                                                                            Comment:   Internal positive controls were identified in this specimen.   Ischemic times are not provided for this sample.        For ER: Ranges for interpretation: Invasive carcinoma cells exhibiting greater than or equal to 10% nuclear staining are considered POSITIVE. Invasive carcinoma cells exhibiting less than 10%, but greater than or equal to 1% are considered LOW POSITIVE. Invasive carcinoma cells exhibiting less than 1% staining are considered NEGATIVE. (Reference: Arch Pathol Lab Med.  doi:10.5858/arpa. [id number]) The stated steroid receptor activity for ER was derived from rabbit monoclonal antibody staining (clone SP1) on formalin fixed, paraffin embedded specimens, unless otherwise noted.  Each assay is performed using appropriate positive and negative internal controls.     For [place]: Ranges for interpretation: Invasive carcinoma cells exhibiting greater than or equal to 1% nuclear staining are considered POSITIVE.  Invasive carcinoma cells exhibiting less than 1% staining are considered NEGATIVE. (Reference: Arch Pathol Lab Med. doi:10.5858/arpa. [id number]) The stated steroid receptor activity for [place] was derived from rabbit monoclonal antibody staining (clone 1E2) on formalin fixed, paraffin embedded specimens, unless otherwise noted.  The method employed was a standard peroxidase labeled polymer detection system. Each assay is performed using appropriate positive and negative internal controls.     For HER2: Ranges for interpretation: POSITIVE (3+): greater than or equal to 10% tumor cells with intense and uniform staining; EQUIVOCAL (2+): weak to moderate complete immunoreactivity in >10% of tumor cells or circumferential intense staining in less than or equal to 10% of cells; and NEGATIVE (1+): Faint weak immunoreactivity in >10% of tumor cells, but only a portion of the membrane is positive; NEGATIVE (0):No immunoreactivity or immunoreactivity in less than or equal to 10% of tumor cells. All tests are performed using a [place] Pathway HER-2/nav (4B5) rabbit monoclonal primary antibody on formalin fixed, paraffin embedded tissue, unless otherwise noted. Only invasive carcinoma is evaluated using the ASCO/CAP scoring system (Arch Pathol Lab Med 2018; 142: 4255-8392) unless otherwise specified.  External cell culture and tissue controls stain appropriately.    Addendum electronically signed by Judy Merrill MD on 8/13/2024 at 1601   Addendum   HER2 DUAL PAOLA FOR DETECTION OF HER2 GENE  AMPLIFICATION  HER2 Dual PAOLA DNA Probe Cocktail Assay from [employer] ([employer])    Results are expressed as the averaged ratio HER2/chromosome 17 signals in 20 nuclei.        Source of Specimen: Left breast core needle biopsy  Paraffin Block: [id number] A2  Duration of fixation:  unless otherwise noted, 6-72 hours fixation     TEST RESULTS:    Number of tumor cells counted:  20  Number of observers: 1  Average number of HER2 signals/nucleus:  3.2  Average number of CEP 17 signals/nucleus: 1.8  Ratio of average HER2/CEP 17:  1.7     INTERPRETATION:       Negative (Not amplified) SEE COMMENT                                              COMMENT: Analysis was performed on a limited focus of invasion, Consider repeating the receptors on the excision specimen.     NOTE:   Patient's with a HER2/CEP 17 Dual PAOLA ratio of greater than or equal to 2.0 were considered eligible for treatment in the adjuvant trastuzumab trials.  (References:  Goran et al.  Breast Cancer Res Treat 94:S5, 2005 (Supp 1; Abs1); Marcellus et al, N Engl J Med 353:  4872-3541; catrachito Sunshine al, N Engl J Med 353:  0294-3426, 2005; and JOHNNY trial study, presented as late breaking abstract at 42nd Annual Meeting of the American Society of Clinical Oncology, [place], [place], J Clin Oncol 24, 2006) Lora et al J. Clin Oncol;  Recommendations for human epidermal growth factor receptor 2 testing in breast cancer: American Society of Clinical Oncology/College of American Pathologists clinical practice guideline update 2018.        REFERENCE RANGES:  Ratio <2.0 and average HER2 signal <4.0: Negative (non-amplified)  Ratio >=2.0 and average HER2 signal >=4.0: Positive (amplified)     Control results:  External (amplified, equivocal, non-amplified) and internal controls perform as expected.     INFORM HER2 Dual PAOLA DNA Probe Cocktail Assay from [employer] ([employer] is intended to determine HER2 gene status by enumeration of the  ratio of the HER2 gene to Chromosome 17. The HER2 and Chromosome 17 probes are detected using two color chromogenic in situ hybridization (PAOLA) in formalin-fixed, paraffin-embedded human breast cancer and gastroesophageal tissue specimens following staining on [employer] ULTRA automated slide stainer (using [place] software), by light microscopy. The INFORM HER2 Dual PAOLA DNA Probe Cocktail is indicated as an aid in the assessment of patients for whom Herceptin (trastuzumab) treatment is being considered.    This assay was interpreted by a qualified reader in conjunction with histological examination, relevant clinical information, and proper controls.    This reagent is intended for in vitro diagnostic (IVD) use.     One or more of the reagents used to perform assays on this specimen MAY have contained components considered to be analyte specific reagents (ASR's).  ASR's have not been cleared or approved by the U.S.Food and Drug Administration.  These assays were developed and their performance characteristics determined by the Department of Pathology at [facility].  The assays were performed with appropriate positive and negative controls which stained appropriately.    Addendum electronically signed by Judy Merrill MD on 8/15/2024 at 1025       Social History:  Tobacco Use - former 30 years  Do you use any recreational drugs - no  Alcohol Use - 2-3 daily  Caffeine Intake - 2 cups coffee daily  Working - full time  Marital status -   Living with - spouse    Past Medical History:   Diagnosis Date   • Encounter for screening mammogram for malignant neoplasm of breast     Visit for screening mammogram   • Personal history of other diseases of the musculoskeletal system and connective tissue     History of osteopenia     Past Surgical History:   Procedure Laterality Date   • ABSCESS DRAINAGE Right     breast drained by dr. almeida   • BREAST BIOPSY Bilateral 08/08/2024   • BREAST  "LUMPECTOMY Right 2000    benign breast lump   • COLONOSCOPY  05/20/2013    Complete Colonoscopy   • KNEE SURGERY  05/20/2013    Knee Surgery Left   • KNEE SURGERY  05/20/2013    Knee Surgery Right   • NOSE SURGERY     • TONSILLECTOMY  05/20/2013    Tonsillectomy     Family History   Problem Relation Name Age of Onset   • Lung cancer Mother     • Lung cancer Father       Allergies   Allergen Reactions   • Egg Other     STOMACH CRAMPS    • Fentanyl Other     Drops blood pressure   • Morphine Nausea/vomiting   • Penicillins Anaphylaxis   • Lisinopril Other   • Tetracyclines GI Upset       Review of Systems  /79   Pulse 80   Temp 36.8 °C (98.2 °F)   Resp 17   Ht 1.499 m (4' 11\")   Wt 60.3 kg (133 lb)   SpO2 97%   BMI 26.86 kg/m²     Objective   Physical Exam  Bilateral breast biopsies healng nicely  Assessment/Plan   Problem List Items Addressed This Visit             ICD-10-CM    Adenocarcinoma of right breast (Multi) C50.911     Patient with bilateral breast cancer.  Left side showing 1 mm intraductal carcinoma ER/[place] positive HER2/nav negative this on imaging measures 2.2 cm but on pathology only measures 1 mm of tumor.  In addition patient has an infiltrating lobular carcinoma found on the right side 12 o'clock position this measures approximately 2 cm on imaging.  This is ER positive [place] HER2/nav negative. Recommend bilateral magseed  localization and lumpectomy with sentinal node biopsy verses mastectomy +/- reconstruction with sentinal node biopsy. Approximately 40 minutes spent with patient discussing her tumor, its characteristics and her treatment options. She will ultimately meet with oncology post surgery, and possibly radiation oncology depending on her surgical treatment choice and its outcome.  Risks benefits and alternatives to surgery were thoroughly discussed with the patient who agrees to proceed., Schedule at [facility].  Patient to have one more follow-up visit prior to surgery date to " solidfy surgical plan and answer any questions and to review labs and xrays ordered.   Recommending MRI of the breast due to bilateral breast cancer.         Adenocarcinoma of left breast (Multi) - Primary C50.912     Patient with bilateral breast cancer.  Left side showing 1 mm intraductal carcinoma ER/UT positive HER2/nav negative this on imaging measures 2.2 cm but on pathology only measures 1 mm of tumor.  In addition patient has an infiltrating lobular carcinoma found on the right side 12 o'clock position this measures approximately 2 cm on imaging.  This is ER positive [place] HER2/nav negative. Recommend bilateral magseed  localization and lumpectomy with sentinal node biopsy verses mastectomy +/- reconstruction with sentinal node biopsy. Approximately 40 minutes spent with patient discussing her tumor, its characteristics and her treatment options. She will ultimately meet with oncology post surgery, and possibly radiation oncology depending on her surgical treatment choice and its outcome.  Risks benefits and alternatives to surgery were thoroughly discussed with the patient who agrees to proceed., Schedule at [facility].  Patient to have one more follow-up visit prior to surgery date to solidfy surgical plan and answer any questions and to review labs and xrays ordered.   Recommending MRI of the breast due to bilateral breast cancer.          Breast History:  Patient is new to clinic and presents for Bilateral US guided breast biopsy 8/8/2024.  Patient indicates she had a palpable lump in her right breast for years.  Recently however she thinks it is increased in size.  She noticed something in the mirror when she looked at her breast and subsequently called.  Patient did not feel any lumps in her left breast until after the imaging.  Patient has a history of a right breast biopsy which sounds like an excisional biopsy in the year 2000 for benign disease.  Patient has a history of left breast abscess in 1993 that  was taken care of as well.  Patient denies any nipple discharge.  Patient had a maternal great grandmother had ovarian cancer in her 80s.  BI MAMMO BILATERAL DIAGNOSTIC TOMOSYNTHESIS; BI US BREAST LIMITED  BILATERAL;  8/2/2024 1:06 pm; 8/2/2024 2:26 pm      ACCESSION NUMBER(S):  [id number]; [id number]      ORDERING CLINICIAN:  CARMEN BARBER      INDICATION:  Signs/Symptoms:DIAG BILAT MAMMO W ZOHRA; Signs/Symptoms:US BREAST.  Palpable lump right breast upper-outer quadrant. Area of concern is  demarcated with an external skin marker.      COMPARISON:  2023, 2022, 2021, 2020, 2018      FINDINGS:  2D and tomosynthesis images were reviewed at 1 mm slice thickness.      Density:  The breast tissue is heterogeneously dense, which may  obscure small masses.      Right Breast :  *There is a subtle area of architectural distortion corresponding  with the palpable area of concern at the 12 o'clock position right  breast anterior depth.          Left Breast  :  *Coarse benign calcifications are scattered throughout the left  breast. There is a developing cluster of coarse calcifications within  the comp of dense tissue upper-outer quadrant left breast mid depth.  There is questionable increasing tissue density in this distribution  as well.              ULTRASOUND FINDINGS:  Patient was scanned both by the technologist as well as myself.  Right Breast :  *The area of palpable concern corresponds with an taller than wide  area of acoustic shadowing, with marked increased stiffness on  elastography vascular flow along the anterior margin. The most dense  area measures 0.8 x 0.7 x 1.2 cm although the upon further scanning  of this area may be extension to much broader area measuring up to  1.5 x 3.2 cm. This area of concern lies at the 12 o'clock position 4  cm from the nipple. *Axillary lymph nodes on the right are normal.          Left Breast  :  *The area of concern upper-outer quadrant left breast 7 cm from the  nipple  at the 1 o'clock position corresponds with a macrolobulated  heterogeneous mass with increased stiffness on elastography,  demonstrating some internal vascularity along with the coarse  calcifications noted mammographically. This area measures  approximately 1.2 x 2.0 x 2.7 cm. *Scanning of the axilla  demonstrates normal lymph nodes without lymphadenopathy.           Impression   Bilateral breast biopsy is recommended for the areas of concern  described above, palpable on the right at the 12 o'clock position and  within the upper-outer quadrant on the left at the 1 o'clock  position. Need for biopsy was discussed with the patient at the time  of the exam.      BI-RADS CATEGORY:      BI-RADS CATEGORY:  5 Highly Suggestive of Malignancy.  Recommendation:  Surgical Consultation and Biopsy.  Recommended Date:  Immediate.  Laterality:  Bilateral.      For any future breast imaging appointments, please call [phone]).          MACRO:  Critical Finding:  See findings. Notification was initiated on  8/2/2024 at 3:01 pm by  Cecil Kyle.  (**-[facility]-**) Instructions:  Surgical Consultation and Imaging Guided Biopsy.    Patient with palpable lesion right breast 12 o'clock position initially appeared to be a 1.2 cm but on further scanning may actually be an area of 3.2 cm.  Recommend ultrasound-guided biopsy.Patient to be scheduled for ultrasound guided  biopsy of the breast in the radiology department. Risk, benefits and alternatives to this plan were thoroughly discussed with the patient who agrees to proceed.  The procedure was also thoroughly discussed as well as her follow-up. She is to see me in the office in one week but I also will call as soon as I see the pathology which is usually 4 working days from procedure.   An extended period of time was spent with the patient and her  concerning her history her liver lack history as well as her severe anxiety when it comes to undergoing a biopsy.  I talked a  significant amount of time just to talk her into the biopsy.    Patient with a 2.7 cm radiographic abnormality left breast.  Recommend ultrasound-guided biopsy.Patient to be scheduled for ultrasound guided  biopsy of the breast in the radiology department. Risk, benefits and alternatives to this plan were thoroughly discussed with the patient who agrees to proceed.  The procedure was also thoroughly discussed as well as her follow-up. She is to see me in the office in one week but I also will call as soon as I see the pathology which is usually 4 working days from procedure.      Previous Biopsy: yes Menarche Age: 14 Age of first delivery: 21 Breastfeed: no Menopause age: 50's HRT: possibly- can't remember Family history of breast cancer: No Family history of ovarian cancer: Yes Family history of pancreatic cancer: No G 3 P 2       Dayan Hawthorne MD 08/27/24 12:15 PM

## 2024-08-27 ENCOUNTER — OFFICE VISIT (OUTPATIENT)
Dept: SURGERY | Facility: CLINIC | Age: 79
End: 2024-08-27
Payer: COMMERCIAL

## 2024-08-27 VITALS
BODY MASS INDEX: 26.81 KG/M2 | OXYGEN SATURATION: 97 % | HEIGHT: 59 IN | SYSTOLIC BLOOD PRESSURE: 145 MMHG | DIASTOLIC BLOOD PRESSURE: 79 MMHG | WEIGHT: 133 LBS | HEART RATE: 80 BPM | TEMPERATURE: 98.2 F | RESPIRATION RATE: 17 BRPM

## 2024-08-27 DIAGNOSIS — C50.912 ADENOCARCINOMA OF LEFT BREAST (MULTI): Primary | ICD-10-CM

## 2024-08-27 DIAGNOSIS — C50.911 ADENOCARCINOMA OF RIGHT BREAST (MULTI): ICD-10-CM

## 2024-08-27 PROCEDURE — 99215 OFFICE O/P EST HI 40 MIN: CPT | Performed by: SURGERY

## 2024-08-27 PROCEDURE — 1159F MED LIST DOCD IN RCRD: CPT | Performed by: SURGERY

## 2024-08-27 PROCEDURE — 1126F AMNT PAIN NOTED NONE PRSNT: CPT | Performed by: SURGERY

## 2024-08-27 PROCEDURE — 1036F TOBACCO NON-USER: CPT | Performed by: SURGERY

## 2024-08-27 RX ORDER — SODIUM CHLORIDE, SODIUM LACTATE, POTASSIUM CHLORIDE, CALCIUM CHLORIDE 600; 310; 30; 20 MG/100ML; MG/100ML; MG/100ML; MG/100ML
20 INJECTION, SOLUTION INTRAVENOUS CONTINUOUS
OUTPATIENT
Start: 2024-08-27

## 2024-08-27 RX ORDER — VANCOMYCIN HYDROCHLORIDE 1 G/200ML
1000 INJECTION, SOLUTION INTRAVENOUS ONCE
OUTPATIENT
Start: 2024-08-27 | End: 2024-08-27

## 2024-08-27 RX ORDER — ACETAMINOPHEN 500 MG
500 TABLET ORAL EVERY 6 HOURS PRN
COMMUNITY

## 2024-08-27 ASSESSMENT — LIFESTYLE VARIABLES
HOW OFTEN DO YOU HAVE SIX OR MORE DRINKS ON ONE OCCASION: NEVER
SKIP TO QUESTIONS 9-10: 1
HOW OFTEN DO YOU HAVE A DRINK CONTAINING ALCOHOL: 4 OR MORE TIMES A WEEK
AUDIT-C TOTAL SCORE: 4
HOW MANY STANDARD DRINKS CONTAINING ALCOHOL DO YOU HAVE ON A TYPICAL DAY: 1 OR 2

## 2024-08-27 ASSESSMENT — PATIENT HEALTH QUESTIONNAIRE - PHQ9
1. LITTLE INTEREST OR PLEASURE IN DOING THINGS: NOT AT ALL
SUM OF ALL RESPONSES TO PHQ9 QUESTIONS 1 & 2: 0
2. FEELING DOWN, DEPRESSED OR HOPELESS: NOT AT ALL

## 2024-08-27 ASSESSMENT — COLUMBIA-SUICIDE SEVERITY RATING SCALE - C-SSRS
1. IN THE PAST MONTH, HAVE YOU WISHED YOU WERE DEAD OR WISHED YOU COULD GO TO SLEEP AND NOT WAKE UP?: NO
2. HAVE YOU ACTUALLY HAD ANY THOUGHTS OF KILLING YOURSELF?: NO
6. HAVE YOU EVER DONE ANYTHING, STARTED TO DO ANYTHING, OR PREPARED TO DO ANYTHING TO END YOUR LIFE?: NO

## 2024-08-27 ASSESSMENT — ENCOUNTER SYMPTOMS
LOSS OF SENSATION IN FEET: 0
DEPRESSION: 0
OCCASIONAL FEELINGS OF UNSTEADINESS: 0

## 2024-08-27 ASSESSMENT — PAIN SCALES - GENERAL: PAINLEVEL: 0-NO PAIN

## 2024-08-27 NOTE — ASSESSMENT & PLAN NOTE
Patient with bilateral breast cancer.  Left side showing 1 mm intraductal carcinoma ER/[place] positive HER2/nav negative this on imaging measures 2.2 cm but on pathology only measures 1 mm of tumor.  In addition patient has an infiltrating lobular carcinoma found on the right side 12 o'clock position this measures approximately 2 cm on imaging.  This is ER positive [place] HER2/nva negative. Recommend bilateral magseed  localization and lumpectomy with sentinal node biopsy verses mastectomy +/- reconstruction with sentinal node biopsy. Approximately 40 minutes spent with patient discussing her tumor, its characteristics and her treatment options. She will ultimately meet with oncology post surgery, and possibly radiation oncology depending on her surgical treatment choice and its outcome.  Risks benefits and alternatives to surgery were thoroughly discussed with the patient who agrees to proceed., Schedule at [facility].  Patient to have one more follow-up visit prior to surgery date to solidfy surgical plan and answer any questions and to review labs and xrays ordered.   Recommending MRI of the breast due to bilateral breast cancer.

## 2024-08-27 NOTE — ASSESSMENT & PLAN NOTE
Patient with bilateral breast cancer.  Left side showing 1 mm intraductal carcinoma ER/[place] positive HER2/nav negative this on imaging measures 2.2 cm but on pathology only measures 1 mm of tumor.  In addition patient has an infiltrating lobular carcinoma found on the right side 12 o'clock position this measures approximately 2 cm on imaging.  This is ER positive [place] HER2/nav negative. Recommend bilateral magseed  localization and lumpectomy with sentinal node biopsy verses mastectomy +/- reconstruction with sentinal node biopsy. Approximately 40 minutes spent with patient discussing her tumor, its characteristics and her treatment options. She will ultimately meet with oncology post surgery, and possibly radiation oncology depending on her surgical treatment choice and its outcome.  Risks benefits and alternatives to surgery were thoroughly discussed with the patient who agrees to proceed., Schedule at [facility].  Patient to have one more follow-up visit prior to surgery date to solidfy surgical plan and answer any questions and to review labs and xrays ordered.   Recommending MRI of the breast due to bilateral breast cancer.

## 2024-08-28 DIAGNOSIS — C50.911 ADENOCARCINOMA OF RIGHT BREAST (MULTI): ICD-10-CM

## 2024-08-28 DIAGNOSIS — C50.912 ADENOCARCINOMA OF LEFT BREAST (MULTI): ICD-10-CM

## 2024-08-30 DIAGNOSIS — C50.911 ADENOCARCINOMA OF RIGHT BREAST (MULTI): ICD-10-CM

## 2024-08-30 DIAGNOSIS — C50.912 ADENOCARCINOMA OF LEFT BREAST (MULTI): ICD-10-CM

## 2024-09-09 ENCOUNTER — HOSPITAL ENCOUNTER (OUTPATIENT)
Dept: RADIOLOGY | Facility: HOSPITAL | Age: 79
Discharge: HOME | End: 2024-09-09
Payer: COMMERCIAL

## 2024-09-09 DIAGNOSIS — C50.912 ADENOCARCINOMA OF LEFT BREAST (MULTI): ICD-10-CM

## 2024-09-09 DIAGNOSIS — C50.911 ADENOCARCINOMA OF RIGHT BREAST (MULTI): ICD-10-CM

## 2024-09-09 PROCEDURE — 77049 MRI BREAST C-+ W/CAD BI: CPT | Performed by: STUDENT IN AN ORGANIZED HEALTH CARE EDUCATION/TRAINING PROGRAM

## 2024-09-09 PROCEDURE — 2550000001 HC RX 255 CONTRASTS: Performed by: SURGERY

## 2024-09-09 PROCEDURE — 77049 MRI BREAST C-+ W/CAD BI: CPT

## 2024-09-09 PROCEDURE — A9575 INJ GADOTERATE MEGLUMI 0.1ML: HCPCS | Performed by: SURGERY

## 2024-09-09 RX ORDER — GADOTERATE MEGLUMINE 376.9 MG/ML
15 INJECTION INTRAVENOUS
Status: COMPLETED | OUTPATIENT
Start: 2024-09-09 | End: 2024-09-09

## 2024-09-11 ENCOUNTER — APPOINTMENT (OUTPATIENT)
Dept: PREADMISSION TESTING | Facility: HOSPITAL | Age: 79
End: 2024-09-11
Payer: COMMERCIAL

## 2024-09-18 ENCOUNTER — HOSPITAL ENCOUNTER (OUTPATIENT)
Dept: RADIOLOGY | Facility: HOSPITAL | Age: 79
Discharge: HOME | End: 2024-09-18
Payer: COMMERCIAL

## 2024-09-18 ENCOUNTER — PRE-ADMISSION TESTING (OUTPATIENT)
Dept: PREADMISSION TESTING | Facility: HOSPITAL | Age: 79
End: 2024-09-18
Payer: COMMERCIAL

## 2024-09-18 VITALS
HEART RATE: 95 BPM | SYSTOLIC BLOOD PRESSURE: 179 MMHG | TEMPERATURE: 98.2 F | DIASTOLIC BLOOD PRESSURE: 73 MMHG | OXYGEN SATURATION: 99 % | HEIGHT: 60 IN | RESPIRATION RATE: 16 BRPM | BODY MASS INDEX: 25.91 KG/M2 | WEIGHT: 132 LBS

## 2024-09-18 DIAGNOSIS — C50.911 ADENOCARCINOMA OF RIGHT BREAST (MULTI): ICD-10-CM

## 2024-09-18 DIAGNOSIS — Z01.818 PREOPERATIVE TESTING: Primary | ICD-10-CM

## 2024-09-18 DIAGNOSIS — C50.912 ADENOCARCINOMA OF LEFT BREAST (MULTI): ICD-10-CM

## 2024-09-18 LAB
ANION GAP SERPL CALCULATED.3IONS-SCNC: 13 MMOL/L (ref 10–20)
BUN SERPL-MCNC: 13 MG/DL (ref 6–23)
CALCIUM SERPL-MCNC: 10.1 MG/DL (ref 8.6–10.3)
CHLORIDE SERPL-SCNC: 104 MMOL/L (ref 98–107)
CO2 SERPL-SCNC: 28 MMOL/L (ref 21–32)
CREAT SERPL-MCNC: 0.78 MG/DL (ref 0.5–1.05)
EGFRCR SERPLBLD CKD-EPI 2021: 78 ML/MIN/1.73M*2
ERYTHROCYTE [DISTWIDTH] IN BLOOD BY AUTOMATED COUNT: 12.7 % (ref 11.5–14.5)
GLUCOSE SERPL-MCNC: 112 MG/DL (ref 74–99)
HCT VFR BLD AUTO: 41.8 % (ref 36–46)
HGB BLD-MCNC: 14.2 G/DL (ref 12–16)
MCH RBC QN AUTO: 29.8 PG (ref 26–34)
MCHC RBC AUTO-ENTMCNC: 34 G/DL (ref 32–36)
MCV RBC AUTO: 88 FL (ref 80–100)
NRBC BLD-RTO: 0 /100 WBCS (ref 0–0)
PLATELET # BLD AUTO: 248 X10*3/UL (ref 150–450)
POTASSIUM SERPL-SCNC: 4.4 MMOL/L (ref 3.5–5.3)
RBC # BLD AUTO: 4.76 X10*6/UL (ref 4–5.2)
SODIUM SERPL-SCNC: 141 MMOL/L (ref 136–145)
WBC # BLD AUTO: 4.7 X10*3/UL (ref 4.4–11.3)

## 2024-09-18 PROCEDURE — 2500000005 HC RX 250 GENERAL PHARMACY W/O HCPCS: Performed by: STUDENT IN AN ORGANIZED HEALTH CARE EDUCATION/TRAINING PROGRAM

## 2024-09-18 PROCEDURE — 19285 PERQ DEV BREAST 1ST US IMAG: CPT | Mod: RIGHT SIDE | Performed by: STUDENT IN AN ORGANIZED HEALTH CARE EDUCATION/TRAINING PROGRAM

## 2024-09-18 PROCEDURE — 19285 PERQ DEV BREAST 1ST US IMAG: CPT | Mod: RT

## 2024-09-18 PROCEDURE — 80048 BASIC METABOLIC PNL TOTAL CA: CPT

## 2024-09-18 PROCEDURE — 36415 COLL VENOUS BLD VENIPUNCTURE: CPT

## 2024-09-18 PROCEDURE — 77065 DX MAMMO INCL CAD UNI: CPT

## 2024-09-18 PROCEDURE — A4648 IMPLANTABLE TISSUE MARKER: HCPCS

## 2024-09-18 PROCEDURE — 2780000003 HC OR 278 NO HCPCS

## 2024-09-18 PROCEDURE — 85027 COMPLETE CBC AUTOMATED: CPT

## 2024-09-18 PROCEDURE — 77066 DX MAMMO INCL CAD BI: CPT | Mod: RIGHT SIDE | Performed by: STUDENT IN AN ORGANIZED HEALTH CARE EDUCATION/TRAINING PROGRAM

## 2024-09-18 PROCEDURE — 19285 PERQ DEV BREAST 1ST US IMAG: CPT | Mod: LT

## 2024-09-18 PROCEDURE — 19286 PERQ DEV BREAST ADD US IMAG: CPT | Mod: RIGHT SIDE | Performed by: STUDENT IN AN ORGANIZED HEALTH CARE EDUCATION/TRAINING PROGRAM

## 2024-09-18 PROCEDURE — 99204 OFFICE O/P NEW MOD 45 MIN: CPT | Performed by: NURSE PRACTITIONER

## 2024-09-18 RX ORDER — LIDOCAINE HYDROCHLORIDE 10 MG/ML
INJECTION, SOLUTION EPIDURAL; INFILTRATION; INTRACAUDAL; PERINEURAL AS NEEDED
Status: COMPLETED | OUTPATIENT
Start: 2024-09-18 | End: 2024-09-18

## 2024-09-18 RX ORDER — MULTIVITAMIN/IRON/FOLIC ACID 18MG-0.4MG
1 TABLET ORAL
COMMUNITY

## 2024-09-18 RX ORDER — LIDOCAINE HYDROCHLORIDE 10 MG/ML
INJECTION, SOLUTION EPIDURAL; INFILTRATION; INTRACAUDAL; PERINEURAL AS NEEDED
Status: DISCONTINUED | OUTPATIENT
Start: 2024-09-18 | End: 2024-09-19 | Stop reason: HOSPADM

## 2024-09-18 ASSESSMENT — DUKE ACTIVITY SCORE INDEX (DASI)
CAN YOU WALK A BLOCK OR TWO ON LEVEL GROUND: YES
CAN YOU PARTICIPATE IN MODERATE RECREATIONAL ACTIVITIES LIKE GOLF, BOWLING, DANCING, DOUBLES TENNIS OR THROWING A BASEBALL OR FOOTBALL: YES
CAN YOU DO YARD WORK LIKE RAKING LEAVES, WEEDING OR PUSHING A MOWER: NO
CAN YOU HAVE SEXUAL RELATIONS: NO
CAN YOU TAKE CARE OF YOURSELF (EAT, DRESS, BATHE, OR USE TOILET): YES
DASI METS SCORE: 5.8
TOTAL_SCORE: 24.95
CAN YOU DO HEAVY WORK AROUND THE HOUSE LIKE SCRUBBING FLOORS OR LIFTING AND MOVING HEAVY FURNITURE: NO
CAN YOU DO MODERATE WORK AROUND THE HOUSE LIKE VACUUMING, SWEEPING FLOORS OR CARRYING GROCERIES: YES
CAN YOU PARTICIPATE IN STRENOUS SPORTS LIKE SWIMMING, SINGLES TENNIS, FOOTBALL, BASKETBALL, OR SKIING: NO
CAN YOU CLIMB A FLIGHT OF STAIRS OR WALK UP A HILL: YES
CAN YOU RUN A SHORT DISTANCE: NO
CAN YOU WALK INDOORS, SUCH AS AROUND YOUR HOUSE: YES
CAN YOU DO LIGHT WORK AROUND THE HOUSE LIKE DUSTING OR WASHING DISHES: YES

## 2024-09-18 ASSESSMENT — ENCOUNTER SYMPTOMS
CONSTITUTIONAL NEGATIVE: 1
EYES NEGATIVE: 1
PSYCHIATRIC NEGATIVE: 1
MUSCULOSKELETAL NEGATIVE: 1
GASTROINTESTINAL NEGATIVE: 1
NEUROLOGICAL NEGATIVE: 1

## 2024-09-18 NOTE — PREPROCEDURE INSTRUCTIONS
Why must I stop eating and drinking near surgery time?  With sedation, food or liquid in your stomach can enter your lungs causing serious complications  Increases nausea and vomiting    When do I need to stop eating and drinking before my surgery?   Do not eat or drink after midnight the night before your surgery/procedure.  You may have small sips of water to take your medication.    PAT DISCHARGE INSTRUCTIONS    Please call the Same Day Surgery (SDS) Department of the hospital where your procedure will be performed after 2:00 PM the day before your surgery. If you are scheduled on a Monday, or a Tuesday following a Monday holiday, you will need to call on the last business day prior to your surgery.    [place] [id number] [place] [id number] [phone]  Second Floor      Please let your surgeon know if:      You develop any open sores, shingles, burning or painful urination as these may increase your risk of an infection.   You no longer wish to have the surgery.   Any other personal circumstances change that may lead to the need to cancel or defer this surgery-such as being sick or getting admitted to any hospital within one week of your planned procedure.    Your contact details change, such as a change of address or phone number.    Starting now:     Please DO NOT drink alcohol or smoke for 24 hours before surgery. It is well known that quitting smoking can make a huge difference to your health and recovery from surgery. The longer you abstain from smoking, the better your chances of a healthy recovery. If you need help with quitting, call 5-800-QUIT-NOW to be connected to a trained counselor who will discuss the best methods to help you quit.     Before your surgery:    Please stop all supplements 7 days prior to surgery. Or as directed by your surgeon.   Please stop taking NSAID pain medicine such as Advil and Motrin 7 days before surgery.    If you  develop any fever, cough, cold, rashes, cuts, scratches, scrapes, urinary symptoms or infection anywhere on your body (including teeth and gums) prior to surgery, please call your surgeon’s office as soon as possible. This may require treatment to reduce the chance of cancellation on the day of surgery.    The day before your surgery:   DIET- Please follow the diet instructions at the top of your packet.   Get a good night’s rest.  Use the special soap for bathing if you have been instructed to use one.    Scheduled surgery times may change and you will be notified if this occurs - please check your personal voicemail for any updates.     On the morning of surgery:   Wear comfortable, loose fitting clothes which open in the front. Please do not wear moisturizers, creams, lotions, makeup or perfume.    Please bring with you to surgery:   Photo ID and insurance card   Current list of medicines and allergies   Pacemaker/ Defibrillator/Heart stent cards   CPAP machine and mask    Slings/ splints/ crutches   A copy of your complete advanced directive/DHPOA.    Please do NOT bring with you to surgery:   All jewelry and valuables should be left at home.   Prosthetic devices such as contact lenses, hearing aids, dentures, eyelash extensions, hairpins and body piercings must be removed prior to going in to the surgical suite.    After outpatient surgery:   A responsible adult MUST accompany you at the time of discharge and stay with you for 24 hours after your surgery. You may NOT drive yourself home after surgery.    Do not drive, operate machinery, make critical decisions or do activities that require co-ordination or balance until after a night’s sleep.   Do not drink alcoholic beverages for 24 hours.   Instructions for resuming your medications will be provided by your surgeon.    CALL YOUR DOCTOR AFTER SURGERY IF YOU HAVE:     Chills and/or a fever of 101° F or higher.    Redness, swelling, pus or drainage from your  surgical wound or a bad smell from the wound.    Lightheadedness, fainting or confusion.    Persistent vomiting (throwing up) and are not able to eat or drink for 12 hours.    Three or more loose, watery bowel movements in 24 hours (diarrhea).   Difficulty or pain while urinating( after non-urological surgery)    Pain and swelling in your legs, especially if it is only on one side.    Difficulty breathing or are breathing faster than normal.    Any new concerning symptoms.     Medication List            Accurate as of September 18, 2024 11:00 AM. Always use your most recent med list.                acetaminophen 500 mg tablet  Commonly known as: Tylenol  Medication Adjustments for Surgery: Take/Use as prescribed     alpha tocopherol 268 mg (400 unit) capsule  Commonly known as: Vitamin E  Additional Medication Adjustments for Surgery: Take last dose 7 days before surgery     amLODIPine 5 mg tablet  Commonly known as: Norvasc  Medication Adjustments for Surgery: Take on the morning of surgery     b complex 0.4 mg tablet  Additional Medication Adjustments for Surgery: Take last dose 7 days before surgery     Fish OiL 1,000 (120-180) mg capsule  Generic drug: omega 3-dha-epa-fish oil  Additional Medication Adjustments for Surgery: Take last dose 7 days before surgery     levothyroxine 50 mcg tablet  Commonly known as: Synthroid, Levoxyl  Notes to patient: Take evening dose before surgery.     mv,Ca,min-iron-FA-lutein 18 mg iron-400 mcg-6 mg tablet  Additional Medication Adjustments for Surgery: Take last dose 7 days before surgery

## 2024-09-18 NOTE — CPM/PAT H&P
CPM/PAT Evaluation       Name: Ayana Hernandez (Ayana Hernanedz)  /Age: 1945/79 y.o.     In-Person       Chief Complaint: Bilateral breast adenocarcinoma     HPI  A 79-year-old female with bilateral breast adenocarcinoma.  History of self discovered palpable right breast lump.  She had an abnormal bilateral breast mammogram and ultrasound on 2024.  Subsequent bilateral breast biopsy on 2024 revealed invasive intraductal and lobular carcinoma.  She has had excisional right breast biopsy as well as breast abscess in the past.  Endorses right breast dimpling and nipple inversion.  Denies fever, chills, breast pain, or nipple drainage.  She is scheduled for bilateral Magseed localization lumpectomy, bilateral sentinel node biopsy.    Past Medical History:   Diagnosis Date    Adverse effect of anesthesia     ALMOST FAINTED AFTER GETTING UP TO BATHROOM. LOW BP    Delayed emergence from general anesthesia     Encounter for screening mammogram for malignant neoplasm of breast     Visit for screening mammogram    Hypertension     Personal history of other diseases of the musculoskeletal system and connective tissue     History of osteopenia    PONV (postoperative nausea and vomiting)        Past Surgical History:   Procedure Laterality Date    ABSCESS DRAINAGE Right     breast drained by dr. almeida    BREAST BIOPSY Bilateral 2024    BREAST LUMPECTOMY Right 2000    benign breast lump    COLONOSCOPY  2013    Complete Colonoscopy    KNEE SURGERY  2013    Knee Surgery Left    KNEE SURGERY  2013    Knee Surgery Right    NOSE SURGERY      TONSILLECTOMY  2013    Tonsillectomy         Allergies   Allergen Reactions    Egg Other     STOMACH CRAMPS     Fentanyl Other     Drops blood pressure    Morphine Nausea/vomiting    Penicillins Anaphylaxis    Lisinopril Other     LOST EYE SIGHT IN LEFT EYE FOR 30 DAYS  STATES WITH HCTZ    Tetracyclines GI Upset       Current Outpatient  Medications   Medication Sig Dispense Refill    acetaminophen (Tylenol) 500 mg tablet Take 1 tablet (500 mg) by mouth every 6 hours if needed for mild pain (1 - 3).      alpha tocopherol (Vitamin E) 268 mg (400 unit) capsule 1 capsule (400 Units) once every 24 hours.      amLODIPine (Norvasc) 5 mg tablet Take 1 tablet (5 mg) by mouth once daily.      b complex 0.4 mg tablet Take 1 tablet by mouth 1 (one) time per week in the early morning..      levothyroxine (Synthroid, Levoxyl) 50 mcg tablet Take 1 tablet (50 mcg) by mouth early in the morning..      mv,Ca,min-iron-FA-lutein 18 mg iron-400 mcg-6 mg tablet Take 1 tablet by mouth once daily.      omega 3-dha-epa-fish oil (Fish OiL) 1,000 mg (120 mg-180 mg) capsule Take 2 capsules (2,000 mg) by mouth 2 times a day.       No current facility-administered medications for this visit.     Facility-Administered Medications Ordered in Other Visits   Medication Dose Route Frequency Provider Last Rate Last Admin    lidocaine PF (Xylocaine) 10 mg/mL (1 %) injection    PRN Magdy Hernández MD   5 mL at 09/18/24 1448       Review of Systems   Constitutional: Negative.    HENT:  Positive for postnasal drip (fequent throat clearing).    Eyes: Negative.         Glasses   Respiratory:          Some KAUFMAN   Gastrointestinal: Negative.    Genitourinary: Negative.    Musculoskeletal: Negative.    Skin: Negative.    Neurological: Negative.    Psychiatric/Behavioral: Negative.          Physical Exam  Vitals reviewed.   HENT:      Head: Normocephalic and atraumatic.      Mouth/Throat:      Mouth: Mucous membranes are moist.   Eyes:      Pupils: Pupils are equal, round, and reactive to light.      Comments: glasses   Cardiovascular:      Rate and Rhythm: Normal rate and regular rhythm.   Pulmonary:      Effort: Pulmonary effort is normal.      Breath sounds: Normal breath sounds.   Abdominal:      Palpations: Abdomen is soft.   Musculoskeletal:         General: Normal range of motion.     "  Cervical back: Normal range of motion.   Skin:     General: Skin is warm and dry.   Neurological:      Mental Status: She is alert and oriented to person, place, and time.   Psychiatric:         Mood and Affect: Mood normal.          PAT AIRWAY:   Airway:     Mallampati::  II    Neck ROM::  Full  normal        /73   Pulse 95   Temp 36.8 °C (98.2 °F) (Temporal)   Resp 16   Ht 1.524 m (5')   Wt 59.9 kg (132 lb)   SpO2 99%   BMI 25.78 kg/m²       Stop Bang Score 3   CHADS: 4.0%  DASI risk score: 24.95  METS: 5.8  RCRI:0.9%  ASA: II  ARISCAT:1.6%  PAT orders CBC, BMP    Assessment and Plan:     Bilateral breast adenocarcinoma Plan: Bilateral Magseed localization lumpectomy, bilateral sentinel node biopsy  Hypertension managed with amlodipine  Hypothyroidism takes levothyroxine  Osteopenia  Anxiety-reports \"white coat syndrome\"  Ex-smoker  BMI 25.70        "

## 2024-09-19 NOTE — PROGRESS NOTES
Subjective   Patient ID: Ayana Hernandez is a 79 y.o. female who presents for preoperative exam.  HPI  Patient returns to clinic for preoperative exam.  Patient scheduled for Bilateral Breast Lumpectomies with Magseed Localizations with [place] Node Biopsies on 9/23/2024.  Bilateral Magseed placement completed on 9/18/2024.    BI MR BREAST BILATERAL WITH CONTRAST FULL PROTOCOL;  9/9/2024 5:30 pm      ACCESSION NUMBER(S):  [id number]      ORDERING CLINICIAN:  INNA HEADLEY      INDICATION:  Recently diagnosed right invasive lobular carcinoma and left invasive  ductal carcinoma on 08/08/2024.      ,C50.912 Malignant neoplasm of unspecified site of left female breast  (Multi),C50.911 Malignant neoplasm of unspecified site of right  female breast (Multi)      COMPARISON:  Mammogram 08/02/2024, 09/28/2023 and 09/19/2022.      TECHNIQUE:  Using a dedicated breast coil, STIR axial and T1-weighted fat  saturation axial images of the breasts were obtained, the latter both  before and after intravenous administration of Gadolinium DTPA. On an  independent workstation, 3-D images were formulated using Mevio  including time enhancement curves, subtraction images and MIP images.      Intravenous contrast: 12 ML of Dotarem      FINDINGS:  Density: Heterogeneous fibroglandular tissue.      There is symmetric minimal bilateral background enhancement.      RIGHT BREAST: An irregular enhancing and spiculated mass is seen in  the central superior breast at anterior depth. It measures 3.1 x 1.7  x 2.9 cm (in AP x TRV x CC dimensions) as best seen on series 95335  image 80 of 168. It measures up to 3.6 cm at greatest extent. A  biopsy tissue marker with associated susceptibility artifact is seen  on series 4, image 82 of 160. No chest wall or nipple involvement.      No axillary or internal mammary lymphadenopathy is appreciated.      LEFT BREAST: An irregular enhancing mass is seen in the superior  slightly lateral breast at  middle depth. It measures 1.5 x 1.2 x 1.6  cm (in AP x TRV x CC dimensions) as best seen on series 94251, image  82 of 168. A biopsy tissue marker with associated susceptibility  artifact is seen on series 4, image 83 of 160. No chest wall or  nipple involvement.      No axillary or internal mammary lymphadenopathy is appreciated.      NON-BREAST FINDINGS:  None.      IMPRESSION:  Bilateral breast masses consistent with patient's biopsy-proven  malignancy. No lymphadenopathy. No chest wall or nipple involvement.  Surgical and oncologic follow-up is recommended.      BI-RADS CATEGORY:  BI-RADS CATEGORY:  6 Known Biopsy-Proven Malignancy.  Recommendation:  Immediate Follow-up.  Recommended Date:  Immediate.  Laterality:  Bilateral.      For any future breast imaging appointments, please call [phone]  [phone]).      I personally reviewed the image(s) / study and I agree with the  findings as stated by Rachel Christian MD. This study was interpreted at  [facility], [place], [place].      MACRO:  None        Review of Systems    Objective   Physical Exam    Assessment/Plan   Problem List Items Addressed This Visit             ICD-10-CM    Mass overlapping multiple quadrants of right breast - Primary N63.15     Patient with a 3 cm radiographic abnormality right breast that has been confirmed invasive lobular carcinoma.  MRI without any other lesions.  Recommending bilateral Mag seed localization lumpectomy with bilateral sentinel node biopsies.  Risk benefits alternatives of doing the surgery were thoroughly discussed with the patient agrees to proceed. A long discussion was held with the patient today. We together reviewed her labs and pre-operative imaging. We reviewed in detail the step by step events of how her surgical day will proceed and what she can expect that day and post-operatively.  Risks , benefits and alternatives to surgery were thoroughly discussed with the patient who agrees to proceed           Mass of upper outer quadrant of left breast N63.21     Patient is status post MRI after being diagnosed with an invasive ductal carcinoma 1.6 cm left breast upper outer quadrant.  No additional lesion seen on MRI.  Patient scheduled for bilateral Magseed localization lumpectomy with bilateral sentinel node biopsies. A long discussion was held with the patient today. We together reviewed her labs and pre-operative imaging. We reviewed in detail the step by step events of how her surgical day will proceed and what she can expect that day and post-operatively.  Risks , benefits and alternatives to surgery were thoroughly discussed with the patient who agrees to proceed           Breast History:  Patient is new to clinic and presents for Bilateral US guided breast biopsy 8/8/2024.  Patient indicates she had a palpable lump in her right breast for years.  Recently however she thinks it is increased in size.  She noticed something in the mirror when she looked at her breast and subsequently called.  Patient did not feel any lumps in her left breast until after the imaging.  Patient has a history of a right breast biopsy which sounds like an excisional biopsy in the year 2000 for benign disease.  Patient has a history of left breast abscess in 1993 that was taken care of as well.  Patient denies any nipple discharge.  Patient had a maternal great grandmother had ovarian cancer in her 80s.  BI MAMMO BILATERAL DIAGNOSTIC TOMOSYNTHESIS; BI US BREAST LIMITED  BILATERAL;  8/2/2024 1:06 pm; 8/2/2024 2:26 pm      ACCESSION NUMBER(S):  [id number]; [id number]      ORDERING CLINICIAN:  CARMEN BARBER      INDICATION:  Signs/Symptoms:DIAG BILAT MAMMO W ZOHRA; Signs/Symptoms:US BREAST.  Palpable lump right breast upper-outer quadrant. Area of concern is  demarcated with an external skin marker.      COMPARISON:  2023, 2022, 2021, 2020, 2018      FINDINGS:  2D and tomosynthesis images were reviewed at 1 mm slice thickness.       Density:  The breast tissue is heterogeneously dense, which may  obscure small masses.      Right Breast :  *There is a subtle area of architectural distortion corresponding  with the palpable area of concern at the 12 o'clock position right  breast anterior depth.          Left Breast  :  *Coarse benign calcifications are scattered throughout the left  breast. There is a developing cluster of coarse calcifications within  the comp of dense tissue upper-outer quadrant left breast mid depth.  There is questionable increasing tissue density in this distribution  as well.              ULTRASOUND FINDINGS:  Patient was scanned both by the technologist as well as myself.  Right Breast :  *The area of palpable concern corresponds with an taller than wide  area of acoustic shadowing, with marked increased stiffness on  elastography vascular flow along the anterior margin. The most dense  area measures 0.8 x 0.7 x 1.2 cm although the upon further scanning  of this area may be extension to much broader area measuring up to  1.5 x 3.2 cm. This area of concern lies at the 12 o'clock position 4  cm from the nipple. *Axillary lymph nodes on the right are normal.          Left Breast  :  *The area of concern upper-outer quadrant left breast 7 cm from the  nipple at the 1 o'clock position corresponds with a macrolobulated  heterogeneous mass with increased stiffness on elastography,  demonstrating some internal vascularity along with the coarse  calcifications noted mammographically. This area measures  approximately 1.2 x 2.0 x 2.7 cm. *Scanning of the axilla  demonstrates normal lymph nodes without lymphadenopathy.           Impression   Bilateral breast biopsy is recommended for the areas of concern  described above, palpable on the right at the 12 o'clock position and  within the upper-outer quadrant on the left at the 1 o'clock  position. Need for biopsy was discussed with the patient at the time  of the exam.      BI-RADS  CATEGORY:      BI-RADS CATEGORY:  5 Highly Suggestive of Malignancy.  Recommendation:  Surgical Consultation and Biopsy.  Recommended Date:  Immediate.  Laterality:  Bilateral.      For any future breast imaging appointments, please call [phone]  ([phone]).          MACRO:  Critical Finding:  See findings. Notification was initiated on  8/2/2024 at 3:01 pm by  Cecil Kyle.  (**-Y[facility]-**) Instructions:  Surgical Consultation and Imaging Guided Biopsy.    Patient with palpable lesion right breast 12 o'clock position initially appeared to be a 1.2 cm but on further scanning may actually be an area of 3.2 cm.  Recommend ultrasound-guided biopsy.Patient to be scheduled for ultrasound guided  biopsy of the breast in the radiology department. Risk, benefits and alternatives to this plan were thoroughly discussed with the patient who agrees to proceed.  The procedure was also thoroughly discussed as well as her follow-up. She is to see me in the office in one week but I also will call as soon as I see the pathology which is usually 4 working days from procedure.   An extended period of time was spent with the patient and her  concerning her history her liver lack history as well as her severe anxiety when it comes to undergoing a biopsy.  I talked a significant amount of time just to talk her into the biopsy.    Patient with a 2.7 cm radiographic abnormality left breast.  Recommend ultrasound-guided biopsy.Patient to be scheduled for ultrasound guided  biopsy of the breast in the radiology department. Risk, benefits and alternatives to this plan were thoroughly discussed with the patient who agrees to proceed.  The procedure was also thoroughly discussed as well as her follow-up. She is to see me in the office in one week but I also will call as soon as I see the pathology which is usually 4 working days from procedure.        Patient with bilateral breast cancer.  Left side showing 1 mm intraductal carcinoma ER/MI  positive HER2/nav negative this on imaging measures 2.2 cm but on pathology only measures 1 mm of tumor.  In addition patient has an infiltrating lobular carcinoma found on the right side 12 o'clock position this measures approximately 2 cm on imaging.  This is ER positive OR HER2/nav negative. Recommend bilateral magseed  localization and lumpectomy with sentinal node biopsy verses mastectomy +/- reconstruction with sentinal node biopsy. Approximately 40 minutes spent with patient discussing her tumor, its characteristics and her treatment options. She will ultimately meet with oncology post surgery, and possibly radiation oncology depending on her surgical treatment choice and its outcome.  Risks benefits and alternatives to surgery were thoroughly discussed with the patient who agrees to proceed., Schedule at [facility].  Patient to have one more follow-up visit prior to surgery date to solidfy surgical plan and answer any questions and to review labs and xrays ordered.   Recommending MRI of the breast due to bilateral breast cancer.     Previous Biopsy: yes Menarche Age: 14 Age of first delivery: 21 Breastfeed: no Menopause age: 50's HRT: possibly- can't remember Family history of breast cancer: No Family history of ovarian cancer: Yes Family history of pancreatic cancer: No G 3 P 2     Vitals:    09/20/24 1038   BP: 164/84   Pulse: 80   Temp: 36.5 °C (97.7 °F)   SpO2: 97%     Allergies   Allergen Reactions    Egg Other     STOMACH CRAMPS     Fentanyl Other     Drops blood pressure    Morphine Nausea/vomiting    Penicillins Anaphylaxis    Lisinopril Other     LOST EYE SIGHT IN LEFT EYE FOR 30 DAYS  STATES WITH HCTZ    Tetracyclines GI Upset     Current Outpatient Medications on File Prior to Visit   Medication Sig Dispense Refill    acetaminophen (Tylenol) 500 mg tablet Take 1 tablet (500 mg) by mouth every 6 hours if needed for mild pain (1 - 3).      alpha tocopherol (Vitamin E) 268 mg (400 unit) capsule 1  capsule (400 Units) once every 24 hours.      amLODIPine (Norvasc) 5 mg tablet Take 1 tablet (5 mg) by mouth once daily.      b complex 0.4 mg tablet Take 1 tablet by mouth 1 (one) time per week in the early morning..      levothyroxine (Synthroid, Levoxyl) 50 mcg tablet Take 1 tablet (50 mcg) by mouth early in the morning..      mv,Ca,min-iron-FA-lutein 18 mg iron-400 mcg-6 mg tablet Take 1 tablet by mouth once daily.      omega 3-dha-epa-fish oil (Fish OiL) 1,000 mg (120 mg-180 mg) capsule Take 2 capsules (2,000 mg) by mouth 2 times a day.      [DISCONTINUED] cyanocobalamin (Vitamin B-12) 250 mcg tablet Take 1 tablet (250 mcg) by mouth once daily.       No current facility-administered medications on file prior to visit.     Family History   Problem Relation Name Age of Onset    Lung cancer Mother      Lung cancer Father       Past Surgical History:   Procedure Laterality Date    ABSCESS DRAINAGE Right     breast drained by dr. almeida    BREAST BIOPSY Bilateral 08/08/2024    BREAST LUMPECTOMY Right 2000    benign breast lump    COLONOSCOPY  05/20/2013    Complete Colonoscopy    KNEE SURGERY  05/20/2013    Knee Surgery Left    KNEE SURGERY  05/20/2013    Knee Surgery Right    NOSE SURGERY      TONSILLECTOMY  05/20/2013    Tonsillectomy     Social History:  Tobacco Use - former  Do you use any recreational drugs -no   Alcohol Use - rarely  Caffeine Intake - coffee 2daily  Working - retired  Marital status -   Living with -    Anya Plaomo MA 09/20/24 10:48 AM

## 2024-09-20 ENCOUNTER — OFFICE VISIT (OUTPATIENT)
Dept: SURGERY | Facility: CLINIC | Age: 79
End: 2024-09-20
Payer: COMMERCIAL

## 2024-09-20 VITALS
WEIGHT: 132 LBS | HEIGHT: 60 IN | SYSTOLIC BLOOD PRESSURE: 164 MMHG | HEART RATE: 80 BPM | TEMPERATURE: 97.7 F | OXYGEN SATURATION: 97 % | DIASTOLIC BLOOD PRESSURE: 84 MMHG | BODY MASS INDEX: 25.91 KG/M2

## 2024-09-20 DIAGNOSIS — N63.15 MASS OVERLAPPING MULTIPLE QUADRANTS OF RIGHT BREAST: Primary | ICD-10-CM

## 2024-09-20 DIAGNOSIS — N63.21 MASS OF UPPER OUTER QUADRANT OF LEFT BREAST: ICD-10-CM

## 2024-09-20 PROCEDURE — 99214 OFFICE O/P EST MOD 30 MIN: CPT | Performed by: SURGERY

## 2024-09-20 PROCEDURE — 1036F TOBACCO NON-USER: CPT | Performed by: SURGERY

## 2024-09-20 PROCEDURE — 1126F AMNT PAIN NOTED NONE PRSNT: CPT | Performed by: SURGERY

## 2024-09-20 PROCEDURE — 1159F MED LIST DOCD IN RCRD: CPT | Performed by: SURGERY

## 2024-09-20 ASSESSMENT — ENCOUNTER SYMPTOMS
DEPRESSION: 0
OCCASIONAL FEELINGS OF UNSTEADINESS: 0
LOSS OF SENSATION IN FEET: 0

## 2024-09-20 ASSESSMENT — COLUMBIA-SUICIDE SEVERITY RATING SCALE - C-SSRS
2. HAVE YOU ACTUALLY HAD ANY THOUGHTS OF KILLING YOURSELF?: NO
1. IN THE PAST MONTH, HAVE YOU WISHED YOU WERE DEAD OR WISHED YOU COULD GO TO SLEEP AND NOT WAKE UP?: NO
6. HAVE YOU EVER DONE ANYTHING, STARTED TO DO ANYTHING, OR PREPARED TO DO ANYTHING TO END YOUR LIFE?: NO

## 2024-09-20 ASSESSMENT — PAIN SCALES - GENERAL: PAINLEVEL: 0-NO PAIN

## 2024-09-20 ASSESSMENT — PATIENT HEALTH QUESTIONNAIRE - PHQ9
SUM OF ALL RESPONSES TO PHQ9 QUESTIONS 1 & 2: 0
2. FEELING DOWN, DEPRESSED OR HOPELESS: NOT AT ALL
1. LITTLE INTEREST OR PLEASURE IN DOING THINGS: NOT AT ALL

## 2024-09-23 ENCOUNTER — ANESTHESIA (OUTPATIENT)
Dept: OPERATING ROOM | Facility: HOSPITAL | Age: 79
End: 2024-09-23
Payer: COMMERCIAL

## 2024-09-23 ENCOUNTER — ANESTHESIA EVENT (OUTPATIENT)
Dept: OPERATING ROOM | Facility: HOSPITAL | Age: 79
End: 2024-09-23
Payer: COMMERCIAL

## 2024-09-23 ENCOUNTER — HOSPITAL ENCOUNTER (OUTPATIENT)
Dept: RADIOLOGY | Facility: HOSPITAL | Age: 79
Setting detail: OUTPATIENT SURGERY
Discharge: HOME | End: 2024-09-23
Payer: COMMERCIAL

## 2024-09-23 ENCOUNTER — HOSPITAL ENCOUNTER (OUTPATIENT)
Facility: HOSPITAL | Age: 79
Setting detail: OUTPATIENT SURGERY
Discharge: HOME | End: 2024-09-23
Attending: SURGERY | Admitting: SURGERY
Payer: COMMERCIAL

## 2024-09-23 ENCOUNTER — PHARMACY VISIT (OUTPATIENT)
Dept: PHARMACY | Facility: CLINIC | Age: 79
End: 2024-09-23

## 2024-09-23 VITALS
HEART RATE: 79 BPM | OXYGEN SATURATION: 95 % | DIASTOLIC BLOOD PRESSURE: 74 MMHG | SYSTOLIC BLOOD PRESSURE: 159 MMHG | TEMPERATURE: 96.8 F | RESPIRATION RATE: 18 BRPM

## 2024-09-23 DIAGNOSIS — C50.911 ADENOCARCINOMA OF RIGHT BREAST (MULTI): ICD-10-CM

## 2024-09-23 DIAGNOSIS — C50.912 ADENOCARCINOMA OF LEFT BREAST (MULTI): Primary | ICD-10-CM

## 2024-09-23 DIAGNOSIS — C50.912 ADENOCARCINOMA OF LEFT BREAST (MULTI): ICD-10-CM

## 2024-09-23 PROBLEM — E03.9 HYPOTHYROIDISM: Status: ACTIVE | Noted: 2024-09-23

## 2024-09-23 PROCEDURE — 7100000001 HC RECOVERY ROOM TIME - INITIAL BASE CHARGE: Performed by: SURGERY

## 2024-09-23 PROCEDURE — 88341 IMHCHEM/IMCYTCHM EA ADD ANTB: CPT | Performed by: STUDENT IN AN ORGANIZED HEALTH CARE EDUCATION/TRAINING PROGRAM

## 2024-09-23 PROCEDURE — 38900 IO MAP OF SENT LYMPH NODE: CPT | Performed by: SURGERY

## 2024-09-23 PROCEDURE — 96372 THER/PROPH/DIAG INJ SC/IM: CPT | Performed by: SURGERY

## 2024-09-23 PROCEDURE — RXMED WILLOW AMBULATORY MEDICATION CHARGE

## 2024-09-23 PROCEDURE — 88307 TISSUE EXAM BY PATHOLOGIST: CPT | Performed by: STUDENT IN AN ORGANIZED HEALTH CARE EDUCATION/TRAINING PROGRAM

## 2024-09-23 PROCEDURE — 76098 X-RAY EXAM SURGICAL SPECIMEN: CPT

## 2024-09-23 PROCEDURE — 3430000001 HC RX 343 DIAGNOSTIC RADIOPHARMACEUTICALS: Performed by: SURGERY

## 2024-09-23 PROCEDURE — 7100000009 HC PHASE TWO TIME - INITIAL BASE CHARGE: Performed by: SURGERY

## 2024-09-23 PROCEDURE — 2500000005 HC RX 250 GENERAL PHARMACY W/O HCPCS: Performed by: SURGERY

## 2024-09-23 PROCEDURE — RXOTC WILLOW AMBULATORY OTC CHARGE

## 2024-09-23 PROCEDURE — 3600000004 HC OR TIME - INITIAL BASE CHARGE - PROCEDURE LEVEL FOUR: Performed by: SURGERY

## 2024-09-23 PROCEDURE — 88342 IMHCHEM/IMCYTCHM 1ST ANTB: CPT | Mod: TC,TRILAB,WESLAB | Performed by: SURGERY

## 2024-09-23 PROCEDURE — 38792 RA TRACER ID OF SENTINL NODE: CPT

## 2024-09-23 PROCEDURE — 3600000009 HC OR TIME - EACH INCREMENTAL 1 MINUTE - PROCEDURE LEVEL FOUR: Performed by: SURGERY

## 2024-09-23 PROCEDURE — 2500000004 HC RX 250 GENERAL PHARMACY W/ HCPCS (ALT 636 FOR OP/ED): Performed by: ANESTHESIOLOGY

## 2024-09-23 PROCEDURE — 2500000004 HC RX 250 GENERAL PHARMACY W/ HCPCS (ALT 636 FOR OP/ED): Performed by: SURGERY

## 2024-09-23 PROCEDURE — 3700000001 HC GENERAL ANESTHESIA TIME - INITIAL BASE CHARGE: Performed by: SURGERY

## 2024-09-23 PROCEDURE — 3700000002 HC GENERAL ANESTHESIA TIME - EACH INCREMENTAL 1 MINUTE: Performed by: SURGERY

## 2024-09-23 PROCEDURE — 7100000002 HC RECOVERY ROOM TIME - EACH INCREMENTAL 1 MINUTE: Performed by: SURGERY

## 2024-09-23 PROCEDURE — A9520 TC99 TILMANOCEPT DIAG 0.5MCI: HCPCS | Performed by: SURGERY

## 2024-09-23 PROCEDURE — 88342 IMHCHEM/IMCYTCHM 1ST ANTB: CPT | Performed by: STUDENT IN AN ORGANIZED HEALTH CARE EDUCATION/TRAINING PROGRAM

## 2024-09-23 PROCEDURE — 99100 ANES PT EXTEME AGE<1 YR&>70: CPT | Performed by: ANESTHESIOLOGY

## 2024-09-23 PROCEDURE — A19301 PR MASTECTOMY, PARTIAL: Performed by: ANESTHESIOLOGY

## 2024-09-23 PROCEDURE — 88307 TISSUE EXAM BY PATHOLOGIST: CPT | Mod: TC,TRILAB,WESLAB | Performed by: SURGERY

## 2024-09-23 PROCEDURE — 2720000007 HC OR 272 NO HCPCS: Performed by: SURGERY

## 2024-09-23 PROCEDURE — 38525 BIOPSY/REMOVAL LYMPH NODES: CPT | Performed by: SURGERY

## 2024-09-23 PROCEDURE — 2500000005 HC RX 250 GENERAL PHARMACY W/O HCPCS: Performed by: ANESTHESIOLOGY

## 2024-09-23 PROCEDURE — A4649 SURGICAL SUPPLIES: HCPCS | Performed by: SURGERY

## 2024-09-23 PROCEDURE — 19301 PARTIAL MASTECTOMY: CPT | Performed by: SURGERY

## 2024-09-23 PROCEDURE — 7100000010 HC PHASE TWO TIME - EACH INCREMENTAL 1 MINUTE: Performed by: SURGERY

## 2024-09-23 RX ORDER — ISOSULFAN BLUE 50 MG/5ML
INJECTION, SOLUTION SUBCUTANEOUS AS NEEDED
Status: DISCONTINUED | OUTPATIENT
Start: 2024-09-23 | End: 2024-09-23 | Stop reason: HOSPADM

## 2024-09-23 RX ORDER — HYDRALAZINE HYDROCHLORIDE 20 MG/ML
5 INJECTION INTRAMUSCULAR; INTRAVENOUS EVERY 30 MIN PRN
Status: DISCONTINUED | OUTPATIENT
Start: 2024-09-23 | End: 2024-09-23 | Stop reason: HOSPADM

## 2024-09-23 RX ORDER — LIDOCAINE HYDROCHLORIDE 10 MG/ML
INJECTION, SOLUTION INFILTRATION; PERINEURAL AS NEEDED
Status: DISCONTINUED | OUTPATIENT
Start: 2024-09-23 | End: 2024-09-23

## 2024-09-23 RX ORDER — LIDOCAINE HYDROCHLORIDE AND EPINEPHRINE 10; 10 MG/ML; UG/ML
INJECTION, SOLUTION INFILTRATION; PERINEURAL AS NEEDED
Status: DISCONTINUED | OUTPATIENT
Start: 2024-09-23 | End: 2024-09-23 | Stop reason: HOSPADM

## 2024-09-23 RX ORDER — TRAMADOL HYDROCHLORIDE 50 MG/1
50 TABLET ORAL EVERY 4 HOURS PRN
Qty: 30 TABLET | Refills: 0 | Status: SHIPPED | OUTPATIENT
Start: 2024-09-23 | End: 2024-10-03

## 2024-09-23 RX ORDER — SODIUM CHLORIDE, SODIUM LACTATE, POTASSIUM CHLORIDE, CALCIUM CHLORIDE 600; 310; 30; 20 MG/100ML; MG/100ML; MG/100ML; MG/100ML
100 INJECTION, SOLUTION INTRAVENOUS CONTINUOUS
Status: DISCONTINUED | OUTPATIENT
Start: 2024-09-23 | End: 2024-09-23 | Stop reason: HOSPADM

## 2024-09-23 RX ORDER — VANCOMYCIN 1 G/200ML
1000 INJECTION, SOLUTION INTRAVENOUS ONCE
Status: COMPLETED | OUTPATIENT
Start: 2024-09-23 | End: 2024-09-23

## 2024-09-23 RX ORDER — BUPIVACAINE HYDROCHLORIDE 5 MG/ML
INJECTION, SOLUTION PERINEURAL AS NEEDED
Status: DISCONTINUED | OUTPATIENT
Start: 2024-09-23 | End: 2024-09-23 | Stop reason: HOSPADM

## 2024-09-23 RX ORDER — VANCOMYCIN 1 G/200ML
INJECTION, SOLUTION INTRAVENOUS AS NEEDED
Status: DISCONTINUED | OUTPATIENT
Start: 2024-09-23 | End: 2024-09-23

## 2024-09-23 RX ORDER — PHENYLEPHRINE HCL IN 0.9% NACL 0.4MG/10ML
SYRINGE (ML) INTRAVENOUS AS NEEDED
Status: DISCONTINUED | OUTPATIENT
Start: 2024-09-23 | End: 2024-09-23

## 2024-09-23 RX ORDER — MIDAZOLAM HYDROCHLORIDE 1 MG/ML
INJECTION, SOLUTION INTRAMUSCULAR; INTRAVENOUS AS NEEDED
Status: DISCONTINUED | OUTPATIENT
Start: 2024-09-23 | End: 2024-09-23

## 2024-09-23 RX ORDER — ONDANSETRON HYDROCHLORIDE 2 MG/ML
INJECTION, SOLUTION INTRAVENOUS AS NEEDED
Status: DISCONTINUED | OUTPATIENT
Start: 2024-09-23 | End: 2024-09-23

## 2024-09-23 RX ORDER — ROCURONIUM BROMIDE 10 MG/ML
INJECTION, SOLUTION INTRAVENOUS AS NEEDED
Status: DISCONTINUED | OUTPATIENT
Start: 2024-09-23 | End: 2024-09-23

## 2024-09-23 RX ORDER — ALBUTEROL SULFATE 0.83 MG/ML
2.5 SOLUTION RESPIRATORY (INHALATION) ONCE AS NEEDED
Status: DISCONTINUED | OUTPATIENT
Start: 2024-09-23 | End: 2024-09-23 | Stop reason: HOSPADM

## 2024-09-23 RX ORDER — SODIUM CHLORIDE, SODIUM LACTATE, POTASSIUM CHLORIDE, CALCIUM CHLORIDE 600; 310; 30; 20 MG/100ML; MG/100ML; MG/100ML; MG/100ML
20 INJECTION, SOLUTION INTRAVENOUS CONTINUOUS
Status: DISCONTINUED | OUTPATIENT
Start: 2024-09-23 | End: 2024-09-23 | Stop reason: HOSPADM

## 2024-09-23 RX ORDER — ONDANSETRON HYDROCHLORIDE 2 MG/ML
4 INJECTION, SOLUTION INTRAVENOUS ONCE AS NEEDED
Status: DISCONTINUED | OUTPATIENT
Start: 2024-09-23 | End: 2024-09-23 | Stop reason: HOSPADM

## 2024-09-23 RX ORDER — LIDOCAINE HYDROCHLORIDE 10 MG/ML
0.1 INJECTION, SOLUTION EPIDURAL; INFILTRATION; INTRACAUDAL; PERINEURAL ONCE
Status: DISCONTINUED | OUTPATIENT
Start: 2024-09-23 | End: 2024-09-23 | Stop reason: HOSPADM

## 2024-09-23 RX ORDER — PROPOFOL 10 MG/ML
INJECTION, EMULSION INTRAVENOUS AS NEEDED
Status: DISCONTINUED | OUTPATIENT
Start: 2024-09-23 | End: 2024-09-23

## 2024-09-23 SDOH — HEALTH STABILITY: MENTAL HEALTH: CURRENT SMOKER: 0

## 2024-09-23 ASSESSMENT — PAIN - FUNCTIONAL ASSESSMENT
PAIN_FUNCTIONAL_ASSESSMENT: 0-10

## 2024-09-23 ASSESSMENT — PAIN DESCRIPTION - ORIENTATION
ORIENTATION: RIGHT
ORIENTATION: RIGHT

## 2024-09-23 ASSESSMENT — PAIN SCALES - GENERAL
PAINLEVEL_OUTOF10: 4
PAINLEVEL_OUTOF10: 0 - NO PAIN
PAINLEVEL_OUTOF10: 5 - MODERATE PAIN
PAINLEVEL_OUTOF10: 0 - NO PAIN
PAINLEVEL_OUTOF10: 6

## 2024-09-23 ASSESSMENT — PAIN DESCRIPTION - LOCATION
LOCATION: BREAST
LOCATION: BREAST

## 2024-09-23 NOTE — OP NOTE
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]     Procedures :     Magseed Localization Lumpectomy  57025 - SC MASTECTOMY PARTIAL    [place] Node Biopsy  66295 - SC BX/EXC LYMPH NODE OPEN DEEP AXILLARY NODE    SC INTRAOP SENTINEL LYMPH NODE ID W/DYE INJECTION [50491]    BILATERAL  Surgeons      * Dayan Hawthorne - Primary    Resident/Fellow/Other Assistant:  Surgeons and Role:  * No surgeons found with a matching role *    Procedure Summary  Anesthesia: General  ASA: II  Anesthesia Staff: Anesthesiologist: Remi Rojo MD  Estimated Blood Loss: 5mL  Intra-op Medications:   Administrations occurring from 0935 to 1135 on 24:   Medication Name Total Dose   BUPivacaine HCl (Marcaine) 0.5 % (5 mg/mL) injection 5 mL   lidocaine-epinephrine (Xylocaine W/EPI) 1 %-1:100,000 injection 5 mL   isosulfan blue (Lumphazurin) 1 % injection 2.5 mL   isosulfan blue (Lumphazurin) 1 % injection 2.5 mL   BUPivacaine HCl (Marcaine) 0.5 % (5 mg/mL) injection 5 mL   lidocaine-epinephrine (Xylocaine W/EPI) 1 %-1:100,000 injection 5 mL              Anesthesia Record               Intraprocedure I/O Totals       None           Specimen:   ID Type Source Tests Collected by Time   1 : right breast magseed lumpectomy. blue=anterior, short=superior, long=lateral Tissue BREAST LUMPECTOMY RIGHT SURGICAL PATHOLOGY EXAM Dayan Hawthorne MD 2024 1025   2 : right breast sentinal node Tissue SENTINEL LYMPH NODE BREAST RIGHT SURGICAL PATHOLOGY EXAM Dayan Hawthorne MD 2024 1035   3 : left breast sentinal node Tissue  SENTINEL LYMPH NODE BREAST LEFT SURGICAL PATHOLOGY EXAM Dayan Hawthorne MD 9/23/2024 1116   4 : left breast magseed lumpectomy. blue=anterior. short=superior, long=lateral deep Tissue BREAST LUMPECTOMY LEFT SURGICAL PATHOLOGY EXAM Dayan Hawthorne MD 9/23/2024 1115   5 : New Medial superior margin left breast. short=new superior margin, long=new medial margin, blue dye=old lumpectomy margin Tissue BREAST MARGIN LEFT SURGICAL PATHOLOGY EXAM Dayan Hawthorne MD 9/23/2024 1148        Staff:   Circulator: Isidro Estrada Person: Chucho         Drains and/or Catheters: * None in log *    Tourniquet Times:         Implants:     Findings: Patient status post bilateral lumpectomies and bilateral sentinel node biopsies with Lymphazurin and technetium.  Right sided sentinel node biopsy revealed 1 node with blue dye.  Also radiographic confirmation of the graphic abnormality on imaging of the specimen.  Specimen with a short stitch superior long stitch lateral and blue stitch anterior  Left sided sentinel node biopsy revealed excision of 2 nodes both with technetium and Lymphazurin.  Patient's lumpectomy specimen contained to the radiographic abnormality but it was somewhat eccentric as a result a second piece of tissue was taken lateral superiorly.  The original lumpectomy specimen on the left side was marked with a short stitch superior long stitch lateral blue stitch anterior.    Indications: Ayana Hernandez is an 79 y.o. 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     The patient was seen in the preoperative area. The risks, benefits, complications, treatment options, non-operative alternatives, expected recovery and outcomes were discussed with the patient. The possibilities of reaction to medication, pulmonary aspiration, injury to surrounding structures, bleeding, recurrent infection, the need for additional procedures, failure  to diagnose a condition, and creating a complication requiring transfusion or operation were discussed with the patient. The patient concurred with the proposed plan, giving informed consent.  The site of surgery was properly noted/marked if necessary per policy. The patient has been actively warmed in preoperative area. Preoperative antibiotics have been ordered and given within 1 hours of incision. Venous thrombosis prophylaxis have been ordered including bilateral sequential compression devices    Procedure Details: Patient was brought to Room in the supine position after undergoing Magseed injection into the left and right  breast prior to her surgery date..   General anesthesia was obtained with LMA. Breast was prepped and draped in a sterile fashion.     Technetium was injected into the left and right breast at the nipple areolar epithelial junction at the 3:00 and 9 o'clock position.  This was in the dermis.  In addition Lymphazurin was injected into the breasts in the upper outer quadrant at the nipple areolar epithelial junction.  Massaging was done to the breasts.  Bilateral  breasts and axillas were then prepped and draped in a sterile fashion.    Operation began with right side  sentinel node biopsy. The sentinel node probe was placed into the axilla  there were no high counts tracking to the axilla.  As a result we started with the lumpectomy on the side.Marking pen was used to delineate the line of incision in the outer upper quadrant of the right  breast where the magseed probe had high counts. Local was infiltrated in the tissue. Fifteen blade scalpel was used to make a 4 centimeter radial  incision in the skin.  The tissue around the Magseed was excised using electrocautery.  A blue stay stitch was placed anterior over the tissue with the shortest distance to the Magseed determined by using the Magseed probe.  The specimen was constantly checked during the dissection to ensure the Magseed was  centrally located.  After it was excised it was marked appropriately with a short stitch superior and long stitch lateral .  It was sent to the Radiology Department who confirmed the presence of the affected area centrally located. . The lumpectomy cavity was then irrigated with water. Hemostasis maintained with electrocautery.  4 clips were placed in the excisional bed for future radiation oncology.  Incision was then closed using interrupted 3-0 Vicryl followed by a running 4-0 Monocryl  Marking pen was used to delineate the line of incision inferior to the hairline in the right axilla. 1% lidocaine mixed with 25% Marcaine with epi was infiltrated into the dermis. A 15 scalpel was used to make a 2-1/2 centimeter incision in the skin. Underlying subcutaneous tissue  using electrocautery. Axillary fascia was opened with the Harmonic thunderbeat scalpel. Probe was placed into the axillary fatty tissue and there was still no technetium readings in the axilla however I did locate the blue Lymphazurin.  The lymphatic vessels were followed to a blue lymph node which was excised.  It was excised using the thunder beat scalpel.  The axilla was irrigated with water. The axillary fascia was closed with a 2-0 Vicryl stitch. The dermis was closed with interrupted 3-0 Vicryl followed by a running 4-0 Monocryl for the skin.    Operation continued  with sentinel node biopsy on the left . The sentinel node probe was placed into the axilla and where there were high counts marking pen was used to delineate the line of incision. 1% lidocaine mixed with 25% Marcaine with epi was infiltrated into the dermis. A 15 scalpel was used to make a 2-1/2 centimeter incision in the skin. Underlying subcutaneous tissue  using electrocautery. Axillary fascia was opened with the Harmonic thunderbeat scalpel. Probe was placed into the axillary fatty tissue and where there were high counts the Ariana and right angle was used to  grasp the tissue and dissect the lymph node from the surrounding tissue.  This lymph node also had Lymphazurin.  After the lymph node was removed it was placed on the probe outside the field and had counts of over 1000. The probe was placed back into the axilla and there was a 2nd area of high counts.  This was dissected using the Thunder Beat and a 2nd sentinel node was found.  The second node also had Lymphazurin and high counts.  The probe was placed back into the axilla and there were no other high counts. The axilla was irrigated with water. The axillary fascia was closed with a 2-0 Vicryl stitch. The dermis was closed with interrupted 3-0 Vicryl followed by a running 4-0 Monocryl for the skin.  Operation then continued with lumpectomy. Marking pen was used to delineate the line of incision in the outer upper quadrant of the left  breast where the magseed probe had high counts. Local was infiltrated in the tissue. Fifteen blade scalpel was used to make a 5 centimeter incision in the skin.  The tissue around the Magseed was excised using electrocautery.  A blue stitch was placed on the specimen anteriorly where it was closest distance to the Magseed with the Magseed probe.  The specimen was constantly checked during the dissection to ensure the Magseed was centrally located.  After it was excised it was marked appropriately with a short stitch superior and long stitch lateral It was sent to the Radiology Department who confirmed the presence of the affected area.  The Magseed however was slightly eccentric to the lateral superior portion of the lumpectomy specimen as a result I took a second piece of lateral superior tissue as a separate specimen for a new margin.  It was marked for orientation as well.  The lumpectomy cavity was then irrigated with water. Hemostasis maintained with electrocautery.  4 clips were placed in the excisional bed for future radiation oncology.  Incision was then closed using  interrupted 3-0 Vicryl followed by a running 4-0 Monocryl. Steri-Strips and sterile dressings were applied patient tolerated procedure well LMA was removed in the operating room and she was transferred to recovery room with rails up all counts were good       Complications:  None; patient tolerated the procedure well.    Disposition: PACU - hemodynamically stable.  Condition: stable     [place] Node Biopsy for Breast Cancer  Operation performed with curative intent Yes   Tracer(s) used to identify sentinel nodes in the upfront surgery (non-neoadjuvant) setting Dye and Radioactive tracer   Tracer(s) used to identify sentinel nodes in the neoadjuvant setting N/A   All nodes (colored or non-colored) present at the end of a dye-filled lymphatic channel were removed Yes   All significantly radioactive nodes were removed Yes   All palpably suspicious nodes were removed N/A   Biopsy-proven positive nodes marked with clips prior to chemotherapy were identified and removed N/A            Additional Details:     Attending Attestation: I was present and scrubbed for the entire procedure.    Dayan Hawthorne  Phone Number: [phone]

## 2024-09-23 NOTE — ANESTHESIA PREPROCEDURE EVALUATION
Patient: Ayana Hernandez    Procedure Information       Date/Time: 09/23/24 0935    Procedures:       Magseed Localization Lumpectomy (Bilateral)      [place] Node Biopsy (Bilateral)    Location: [facility] / [facility]    Surgeons: Dayan Hawthorne MD          Past Medical History:   Diagnosis Date    Adverse effect of anesthesia     ALMOST FAINTED AFTER GETTING UP TO BATHROOM. LOW BP    Delayed emergence from general anesthesia     Encounter for screening mammogram for malignant neoplasm of breast     Visit for screening mammogram    Hypertension     Personal history of other diseases of the musculoskeletal system and connective tissue     History of osteopenia    PONV (postoperative nausea and vomiting)       Relevant Problems   Neuro   (+) Anxiety      Endocrine   (+) Hypothyroidism      GYN   (+) Adenocarcinoma of left breast (Multi)   (+) Adenocarcinoma of right breast (Multi)     Past Surgical History:   Procedure Laterality Date    ABSCESS DRAINAGE Right     breast drained by dr. almeida    BREAST BIOPSY Bilateral 08/08/2024    BREAST LUMPECTOMY Right 2000    benign breast lump    COLONOSCOPY  05/20/2013    Complete Colonoscopy    KNEE SURGERY  05/20/2013    Knee Surgery Left    KNEE SURGERY  05/20/2013    Knee Surgery Right    NOSE SURGERY      TONSILLECTOMY  05/20/2013    Tonsillectomy      Clinical information reviewed:   Tobacco  Allergies  Meds   Med Hx  Surg Hx  OB Status  Fam Hx  Soc   Hx        NPO Detail:  No data recorded     Physical Exam    Airway  Mallampati: II  TM distance: >3 FB  Neck ROM: limited     Cardiovascular    Dental    Pulmonary    Abdominal            Anesthesia Plan    History of general anesthesia?: yes  History of complications of general anesthesia?: no    ASA 2     general     The patient is not a current smoker.  Patient was not previously instructed to abstain from smoking on day of procedure.  Patient did not smoke on day of procedure.    intravenous induction    Postoperative administration of opioids is intended.  Anesthetic plan and risks discussed with patient.    Plan discussed with attending.

## 2024-09-23 NOTE — INTERVAL H&P NOTE
H&P reviewed. The patient was examined and there are no changes to the H&P. Patient with 3cm lesion on the right  and 1.5cm lesion on the left  in need of bilateral lumpectomies and sentinel nodes biopsies

## 2024-09-23 NOTE — POST-PROCEDURE NOTE
Pt arrived to [facility] via cart no distress noted.   Hot tea and cookies taken well.  2 ice bags sent home with pt

## 2024-09-23 NOTE — ANESTHESIA PROCEDURE NOTES
Airway  Date/Time: 9/23/2024 10:09 AM  Urgency: elective    Airway not difficult    Staffing  Performed: attending   Authorized by: Remi Rojo MD    Performed by: Remi Rojo MD  Patient location during procedure: OR    Indications and Patient Condition  Indications for airway management: anesthesia  Spontaneous ventilation: present  Sedation level: deep  Preoxygenated: yes  Patient position: sniffing  MILS maintained throughout  Mask difficulty assessment: 1 - vent by mask    Final Airway Details  Final airway type: endotracheal airway      Successful airway: ETT  Cuffed: yes   Successful intubation technique: direct laryngoscopy  Endotracheal tube insertion site: oral  Blade: Amari  Blade size: #3  ETT size (mm): 7.0  Cormack-Lehane Classification: grade IIa - partial view of glottis  Placement verified by: chest auscultation   Cuff volume (mL): 7  Measured from: lips  ETT to lips (cm): 22  Number of attempts at approach: 1

## 2024-09-23 NOTE — ANESTHESIA POSTPROCEDURE EVALUATION
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])    Surgeons: Dayan Hawthorne MD Responsible Provider: Remi Rojo MD    Anesthesia Type: general ASA Status: 2            Anesthesia Type: general    Vitals Value Taken Time   /73 09/23/24 1218   Temp 36.0 09/23/24 1218   Pulse 79 09/23/24 1218   Resp 14 09/23/24 1218   SpO2 93 09/23/24 1218       Anesthesia Post Evaluation    Patient location during evaluation: PACU  Patient participation: complete - patient participated  Level of consciousness: responsive to light touch  Pain management: adequate  Airway patency: patent  Cardiovascular status: acceptable  Respiratory status: acceptable  Hydration status: acceptable  Postoperative Nausea and Vomiting: none        There were no known notable events for this encounter.

## 2024-09-30 ENCOUNTER — TELEPHONE (OUTPATIENT)
Dept: SURGERY | Facility: CLINIC | Age: 79
End: 2024-09-30
Payer: COMMERCIAL

## 2024-10-01 NOTE — TELEPHONE ENCOUNTER
Spoke with patient who asked if she was allowed to drive as she is not taking any type of pain medication other than Tylenol and Motrin.  Spoke with Dr. Hawthorne who stated that patient is allowed to drive.  Patient notified and verbalized understanding.  Patient denied any other questions or concerns at this time.

## 2024-10-03 NOTE — PROGRESS NOTES
Subjective   Patient ID: Ayana Hernandez is a 79 y.o. female who presents for post operative appointment.  HPI  Patient returns to clinic S/P Bilateral Breast Lumpectomies with Magseed Localizations and Bilateral [place] Node Biopsies on 9/23/2024.  FINAL DIAGNOSIS   A.  RIGHT BREAST, MAGSEED LOCALIZED LUMPECTOMY:  --INVASIVE LOBULAR CARCINOMA, SEE TUMOR SYNOPTIC REPORT  --INVASIVE CARCINOMA, 0.5 MM AWAY FROM THE ANTERIOR MARGIN (A7)   --ATYPICAL DUCTAL HYPERPLASIA  --FLORID LOBULAR CARCINOMA IN SITU  --SEE NOTE     Note: Multiple deeper levels of selected blocks were examined.  Approximate size of invasive lobular carcinoma measured from slice 6 (slide 7 and 8) with approximate tumor size is 36 mm.  Immunohistochemical stains with appropriate controls are performed.  Beta catenin immunohistochemical stain shows reduced/absent membranous staining in invasive lobular carcinoma and lobular neoplasia however it retained membranous staining in atypical ductal hyperplasia.  SMMHC and p63 are negative for myoepithelial lining in invasive carcinoma.  Pancytokeratin AE1/AE3 highlights invasive lobular carcinoma.  This case has been reviewed at breast intradepartmental consensus conference via Zoom meeting.     : Dr. Patricia (A2)     B.  RIGHT AXILLARY SENTINEL LYMPH NODE, EXCISION:  --ONE LYMPH NODE, NEGATIVE FOR METASTATIC CARCINOMA (0/1), SEE NOTE     Note: Multiple deeper levels are examined.  Immunohistochemical stains for cytokeratin AE1/AE3 are negative.     C.  LEFT AXILLARY SENTINEL LYMPH NODE, EXCISION:  --TWO LYMPH NODES, NEGATIVE FOR METASTATIC CARCINOMA (0/2), SEE NOTE     Note: Multiple deeper levels are examined.  Immunohistochemical stains for cytokeratin AE1/AE3 are negative.     D.  LEFT BREAST, MAGSEED LOCALIZED LUMPECTOMY:  --INVASIVE DUCTAL CARCINOMA AND DUCTAL CARCINOMA IN SITU, SEE TUMOR SYNOPTIC REPORT  --INVASIVE CARCINOMA, 1.46 MM AWAY FROM THE ANTERIOR MARGIN (D4)  --LOBULAR  NEOPLASIA (LOBULAR CARCINOMA IN SITU/ATYPICAL LOBULAR HYPERPLASIA)  --SEE NOTE     Note: Multiple deeper levels of selected blocks were examined.  Immunohistochemical stains with appropriate controls are performed.  SMMHC and p63 immunohistochemical stains are absent for myoepithelial lining in invasive carcinoma.  This case has been reviewed at breast intradepartmental consensus conference via Zoom meeting.     : Dr. Patricia (D10)     E.  LEFT BREAST, NEW MEDIAL SUPERIOR MARGIN, EXCISION:  --NEGATIVE FOR CARCINOMA  --ATYPICAL LOBULAR HYPERPLASIA, SEE NOTE     Note: Multiple deeper levels of selected blocks were examined.  Immunohistochemical stains with appropriate controls are performed.  E-cadherin shows reduced membranous staining in atypical lobular hyperplasia.  SMMHC and p63 immunohistochemical stains also evaluated to exclude carcinoma.  This case has been reviewed at breast intradepartmental consensus conference via Zoom meeting.  Invasive carcinoma involves 6 of 14 contiguous specimen slices with approximate tumor size is 24 mm.     Case Summary Report   INVASIVE CARCINOMA OF THE BREAST: Resection   8th Edition - Protocol posted: 9/20/2023INVASIVE CARCINOMA OF THE BREAST: RESECTION - A, B  SPECIMEN   Procedure  Excision (less than total mastectomy)   Specimen Laterality  Right   TUMOR   Tumor Site  Clock position     12 o'clock   Tumor Site  Distance from nipple (Centimeters): 4 cm   Histologic Type  Invasive lobular carcinoma   Histologic Grade (Fabiano Histologic Score)     Glandular (Acinar) / Tubular Differentiation  Score 3   Nuclear Pleomorphism  Score 3   Mitotic Rate  Score 1   Overall Grade  Grade 2 (scores of 6 or 7)   Tumor Size  Greatest dimension of largest invasive focus (Millimeters): 36 mm   Tumor Focality  Single focus of invasive carcinoma   Ductal Carcinoma In Situ (DCIS)  Not identified   Lobular Carcinoma In Situ (LCIS)  Present   Lymphatic and / or Vascular Invasion   Not identified   Dermal Lymphatic and / or Vascular Invasion  No skin present   Microcalcifications  Not identified   Treatment Effect in the Breast  No known presurgical therapy   MARGINS   Margin Status for Invasive Carcinoma  All margins negative for invasive carcinoma   Distance from Invasive Carcinoma to Closest Margin  0.5 mm   Closest Margin(s) to Invasive Carcinoma  Anterior   Distance from Invasive Carcinoma to Posterior Margin  Greater than: 2 mm   Distance from Invasive Carcinoma to Superior Margin  Greater than: 2 mm   Distance from Invasive Carcinoma to Inferior Margin  2 mm   Distance from Invasive Carcinoma to Medial Margin  Greater than: 2 mm   Distance from Invasive Carcinoma to Lateral Margin  Greater than: 2 mm   REGIONAL LYMPH NODES   Regional Lymph Node Status  All regional lymph nodes negative for tumor   Total Number of Lymph Nodes Examined (sentinel and non-sentinel)  1   Number of [place] Nodes Examined  1   pTNM CLASSIFICATION (AJCC 8th Edition)   Reporting of pT, pN, and (when applicable) pM categories is based on information available to the pathologist at the time the report is issued. As per the AJCC (Chapter 1, 8th Ed.) it is the managing physician’s responsibility to establish the final pathologic stage based upon all pertinent information, including but potentially not limited to this pathology report.   pT Category  pT2   pN Category  pN0   N Suffix  (sn)   Breast Biomarker Testing Performed on Previous Biopsy     Estrogen Receptor (ER) Status  Positive (greater than 10% of cells demonstrate nuclear positivity)   Percentage of Cells with Nuclear Positivity  >95 %   Breast Biomarker Testing Performed on Previous Biopsy     Progesterone Receptor (PgR) Status  Negative   Breast Biomarker Testing Performed on Previous Biopsy     HER2 (by immunohistochemistry)  Negative (Score 1+)   Testing Performed on Case Number  [id number], B2   .   INVASIVE CARCINOMA OF THE BREAST: Resection   8th  Edition - Protocol posted: 9/20/2023INVASIVE CARCINOMA OF THE BREAST: RESECTION - C, D, E  SPECIMEN   Procedure  Excision (less than total mastectomy)   Specimen Laterality  Left   TUMOR   Tumor Site  Upper outer quadrant   Histologic Type  Invasive carcinoma of no special type (ductal)   Histologic Grade ([place] Histologic Score)     Glandular (Acinar) / Tubular Differentiation  Score 2   Nuclear Pleomorphism  Score 3   Mitotic Rate  Score 2   Overall Grade  Grade 2 (scores of 6 or 7)   Tumor Size  Greatest dimension of largest invasive focus (Millimeters): 24 mm   Tumor Focality  Single focus of invasive carcinoma   Ductal Carcinoma In Situ (DCIS)  Present     Negative for extensive intraductal component (EIC)   Architectural Patterns  Cribriform   Nuclear Grade  Grade II (intermediate)   Necrosis  Present, focal (small foci or single cell necrosis)   Number of Blocks with DCIS  5   Number of Blocks Examined  11   Lobular Carcinoma In Situ (LCIS)  Present   Lymphatic and / or Vascular Invasion  Not identified   Dermal Lymphatic and / or Vascular Invasion  No skin present   Microcalcifications  Present in DCIS     Present in invasive carcinoma   Treatment Effect in the Breast  No known presurgical therapy   MARGINS   Margin Status for Invasive Carcinoma  All margins negative for invasive carcinoma   Distance from Invasive Carcinoma to Closest Margin  1.46 mm   Closest Margin(s) to Invasive Carcinoma  Anterior   Distance from Invasive Carcinoma to Posterior Margin  Greater than: 2  mm   Distance from Invasive Carcinoma to Superior Margin  Greater than: 2 mm   Distance from Invasive Carcinoma to Inferior Margin  Greater than: 2 mm   Distance from Invasive Carcinoma to Medial Margin  Greater than: 2 mm   Distance from Invasive Carcinoma to Lateral Margin  Greater than: 2 mm   Margin Status for DCIS  All margins negative for DCIS   Distance from DCIS to Closest Margin  DCIS to all margins are equal to or greater  than 2 mm   REGIONAL LYMPH NODES   Regional Lymph Node Status  All regional lymph nodes negative for tumor   Total Number of Lymph Nodes Examined (sentinel and non-sentinel)  2   Number of [place] Nodes Examined  2   pTNM CLASSIFICATION (AJCC 8th Edition)   Reporting of pT, pN, and (when applicable) pM categories is based on information available to the pathologist at the time the report is issued. As per the AJCC (Chapter 1, 8th Ed.) it is the managing physician’s responsibility to establish the final pathologic stage based upon all pertinent information, including but potentially not limited to this pathology report.   pT Category  pT2   pN Category  pN0   N Suffix  (sn)   ADDITIONAL FINDINGS   Additional Findings  Atypical ductal hyperplasia, atypical lobular hyperplasia   Breast Biomarker Testing Performed on Previous Biopsy     Estrogen Receptor (ER) Status  Positive (greater than 10% of cells demonstrate nuclear positivity)   Percentage of Cells with Nuclear Positivity  >95 %   Breast Biomarker Testing Performed on Previous Biopsy     Progesterone Receptor (PgR) Status  Positive   Percentage of Cells with Nuclear Positivity  40 %   Breast Biomarker Testing Performed on Previous Biopsy     HER2 (by immunohistochemistry)  Equivocal (Score 2+)   Percentage of Cells with Uniform Intense Complete Membrane Staining  Cannot be determined   Breast Biomarker Testing Performed on Previous Biopsy     HER2 (by in situ hybridization)  Negative (not amplified)   Testing Performed on Case Number  [id number], A2          Social History:  Tobacco Use - former  Do you use any recreational drugs -no   Alcohol Use - rarely  Caffeine Intake - coffee 2daily  Working - retired  Marital status -   Living with -    Past Medical History:   Diagnosis Date    Adverse effect of anesthesia     ALMOST FAINTED AFTER GETTING UP TO BATHROOM. LOW BP    Delayed emergence from general anesthesia     Encounter for screening  mammogram for malignant neoplasm of breast     Visit for screening mammogram    Hypertension     Personal history of other diseases of the musculoskeletal system and connective tissue     History of osteopenia    PONV (postoperative nausea and vomiting)      Past Surgical History:   Procedure Laterality Date    ABSCESS DRAINAGE Right     breast drained by dr. almeida    BREAST BIOPSY Bilateral 08/08/2024    BREAST LUMPECTOMY Right 2000    benign breast lump    BREAST LUMPECTOMY W/ NEEDLE LOCALIZATION Bilateral 09/23/2024    COLONOSCOPY  05/20/2013    Complete Colonoscopy    KNEE SURGERY  05/20/2013    Knee Surgery Left    KNEE SURGERY  05/20/2013    Knee Surgery Right    NOSE SURGERY      TONSILLECTOMY  05/20/2013    Tonsillectomy     Family History   Problem Relation Name Age of Onset    Lung cancer Mother      Lung cancer Father       Allergies   Allergen Reactions    Egg Other     STOMACH CRAMPS     Fentanyl Other     Drops blood pressure    Morphine Nausea/vomiting    Penicillins Anaphylaxis    Erythromycin Nausea/vomiting    Lisinopril Other     LOST EYE SIGHT IN LEFT EYE FOR 30 DAYS  STATES WITH HCTZ    Tetracyclines GI Upset       Review of Systems  /78   Pulse 91   Temp 36.6 °C (97.8 °F)   Resp 17   Ht 1.524 m (5')   Wt 59.9 kg (132 lb)   SpO2 99%   BMI 25.78 kg/m²     Objective   Physical Exam  Bilateral breast incisions healed nicely.  Bilateral axillary incisions healed nicely.  Patient with a seroma on the right side.  Both in the breast and in the axilla.  Good contour of both breasts.  Assessment/Plan   Problem List Items Addressed This Visit             ICD-10-CM    Adenocarcinoma of right breast (Multi) C50.911     Status post bilateral lumpectomies and sentinel node biopsies.  Both tumors were T2 N0 MX breast cancer.  Right side was ER positive IN HER2/nav negative left side was ER/IN positive HER2/nav negative.  Both were grade 2 out of 3.  Recommend follow-up with radiation oncology as  well as oncology for discussion of further adjuvant therapy.  Patient needs to follow-up with me in 2-3 weeks for wound check.         Adenocarcinoma of left breast (Multi) - Primary C50.912     Status post bilateral lumpectomies and sentinel node biopsies.  Both tumors were T2 N0 MX breast cancer.  Right side was ER positive DC HER2/nav negative left side was ER/DC positive HER2/nav negative.  Both were grade 2 out of 3.  Recommend follow-up with radiation oncology as well as oncology for discussion of further adjuvant therapy.  Patient needs to follow-up with me in 2-3 weeks for wound check            Breast History:  Patient is new to clinic and presents for Bilateral US guided breast biopsy 8/8/2024.  Patient indicates she had a palpable lump in her right breast for years.  Recently however she thinks it is increased in size.  She noticed something in the mirror when she looked at her breast and subsequently called.  Patient did not feel any lumps in her left breast until after the imaging.  Patient has a history of a right breast biopsy which sounds like an excisional biopsy in the year 2000 for benign disease.  Patient has a history of left breast abscess in 1993 that was taken care of as well.  Patient denies any nipple discharge.  Patient had a maternal great grandmother had ovarian cancer in her 80s.  BI MAMMO BILATERAL DIAGNOSTIC TOMOSYNTHESIS; BI US BREAST LIMITED  BILATERAL;  8/2/2024 1:06 pm; 8/2/2024 2:26 pm      ACCESSION NUMBER(S):  [id number]; [id number]      ORDERING CLINICIAN:  CARMEN BARBER      INDICATION:  Signs/Symptoms:DIAG BILAT MAMMO W ZOHRA; Signs/Symptoms:US BREAST.  Palpable lump right breast upper-outer quadrant. Area of concern is  demarcated with an external skin marker.      COMPARISON:  2023, 2022, 2021, 2020, 2018      FINDINGS:  2D and tomosynthesis images were reviewed at 1 mm slice thickness.      Density:  The breast tissue is heterogeneously dense, which may  obscure  small masses.      Right Breast :  *There is a subtle area of architectural distortion corresponding  with the palpable area of concern at the 12 o'clock position right  breast anterior depth.          Left Breast  :  *Coarse benign calcifications are scattered throughout the left  breast. There is a developing cluster of coarse calcifications within  the comp of dense tissue upper-outer quadrant left breast mid depth.  There is questionable increasing tissue density in this distribution  as well.              ULTRASOUND FINDINGS:  Patient was scanned both by the technologist as well as myself.  Right Breast :  *The area of palpable concern corresponds with an taller than wide  area of acoustic shadowing, with marked increased stiffness on  elastography vascular flow along the anterior margin. The most dense  area measures 0.8 x 0.7 x 1.2 cm although the upon further scanning  of this area may be extension to much broader area measuring up to  1.5 x 3.2 cm. This area of concern lies at the 12 o'clock position 4  cm from the nipple. *Axillary lymph nodes on the right are normal.          Left Breast  :  *The area of concern upper-outer quadrant left breast 7 cm from the  nipple at the 1 o'clock position corresponds with a macrolobulated  heterogeneous mass with increased stiffness on elastography,  demonstrating some internal vascularity along with the coarse  calcifications noted mammographically. This area measures  approximately 1.2 x 2.0 x 2.7 cm. *Scanning of the axilla  demonstrates normal lymph nodes without lymphadenopathy.           Impression   Bilateral breast biopsy is recommended for the areas of concern  described above, palpable on the right at the 12 o'clock position and  within the upper-outer quadrant on the left at the 1 o'clock  position. Need for biopsy was discussed with the patient at the time  of the exam.      BI-RADS CATEGORY:      BI-RADS CATEGORY:  5 Highly Suggestive of  Malignancy.  Recommendation:  Surgical Consultation and Biopsy.  Recommended Date:  Immediate.  Laterality:  Bilateral.      For any future breast imaging appointments, please call [phone] ([phone]).          MACRO:  Critical Finding:  See findings. Notification was initiated on  8/2/2024 at 3:01 pm by  Cecil Kyle.  (**-[facility]-**) Instructions:  Surgical Consultation and Imaging Guided Biopsy.    Patient with palpable lesion right breast 12 o'clock position initially appeared to be a 1.2 cm but on further scanning may actually be an area of 3.2 cm.  Recommend ultrasound-guided biopsy.Patient to be scheduled for ultrasound guided  biopsy of the breast in the radiology department. Risk, benefits and alternatives to this plan were thoroughly discussed with the patient who agrees to proceed.  The procedure was also thoroughly discussed as well as her follow-up. She is to see me in the office in one week but I also will call as soon as I see the pathology which is usually 4 working days from procedure.   An extended period of time was spent with the patient and her  concerning her history her liver lack history as well as her severe anxiety when it comes to undergoing a biopsy.  I talked a significant amount of time just to talk her into the biopsy.    Patient with a 2.7 cm radiographic abnormality left breast.  Recommend ultrasound-guided biopsy.Patient to be scheduled for ultrasound guided  biopsy of the breast in the radiology department. Risk, benefits and alternatives to this plan were thoroughly discussed with the patient who agrees to proceed.  The procedure was also thoroughly discussed as well as her follow-up. She is to see me in the office in one week but I also will call as soon as I see the pathology which is usually 4 working days from procedure.         Patient with bilateral breast cancer.  Left side showing 1 mm intraductal carcinoma ER/KY positive HER2/nav negative this on imaging measures 2.2  cm but on pathology only measures 1 mm of tumor.  In addition patient has an infiltrating lobular carcinoma found on the right side 12 o'clock position this measures approximately 2 cm on imaging.  This is ER positive CA HER2/nav negative. Recommend bilateral magseed  localization and lumpectomy with sentinal node biopsy verses mastectomy +/- reconstruction with sentinal node biopsy. Approximately 40 minutes spent with patient discussing her tumor, its characteristics and her treatment options. She will ultimately meet with oncology post surgery, and possibly radiation oncology depending on her surgical treatment choice and its outcome.  Risks benefits and alternatives to surgery were thoroughly discussed with the patient who agrees to proceed., Schedule at [facility].  Patient to have one more follow-up visit prior to surgery date to solidfy surgical plan and answer any questions and to review labs and xrays ordered.   Recommending MRI of the breast due to bilateral breast cancer.   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 No additional lesion seen on MRI. Patient scheduled for bilateral Magseed localization lumpectomy with bilateral sentinel node biopsies. A long discussion was held with the patient today. We together reviewed her labs and pre-operative imaging. We reviewed in detail the step by step events of how her surgical day will proceed and what she can expect that day and post-operatively. Risks , benefits and alternatives to surgery were thoroughly discussed with the patient who agrees to proceed   Patient with a 3 cm radiographic abnormality right breast that has been confirmed invasive lobular carcinoma.  MRI without any other lesions.  Recommending bilateral Mag seed localization lumpectomy with bilateral sentinel node biopsies.  Risk benefits alternatives of doing the surgery were thoroughly discussed with the patient  agrees to proceed. A long discussion was held with the patient today. We together reviewed her labs and pre-operative imaging. We reviewed in detail the step by step events of how her surgical day will proceed and what she can expect that day and post-operatively.  Risks , benefits and alternatives to surgery were thoroughly discussed with the patient who agrees to proceed   Previous Biopsy: yes Menarche Age: 14 Age of first delivery: 21 Breastfeed: no Menopause age: 50's HRT: possibly- can't remember Family history of breast cancer: No Family history of ovarian cancer: Yes Family history of pancreatic cancer: No G 3 P 2       Dayan Hawthorne MD 10/08/24 1:12 PM

## 2024-10-04 LAB
LAB AP ASR DISCLAIMER: NORMAL
LAB AP BLOCK FOR ADDITIONAL STUDIES: NORMAL
LABORATORY COMMENT REPORT: NORMAL
PATH REPORT.FINAL DX SPEC: NORMAL
PATH REPORT.GROSS SPEC: NORMAL
PATH REPORT.RELEVANT HX SPEC: NORMAL
PATH REPORT.TOTAL CANCER: NORMAL
PATHOLOGY SYNOPTIC REPORT: NORMAL

## 2024-10-08 ENCOUNTER — OFFICE VISIT (OUTPATIENT)
Dept: SURGERY | Facility: CLINIC | Age: 79
End: 2024-10-08
Payer: COMMERCIAL

## 2024-10-08 VITALS
BODY MASS INDEX: 25.91 KG/M2 | TEMPERATURE: 97.8 F | SYSTOLIC BLOOD PRESSURE: 160 MMHG | RESPIRATION RATE: 17 BRPM | HEIGHT: 60 IN | DIASTOLIC BLOOD PRESSURE: 78 MMHG | WEIGHT: 132 LBS | HEART RATE: 91 BPM | OXYGEN SATURATION: 99 %

## 2024-10-08 DIAGNOSIS — C50.912 ADENOCARCINOMA OF LEFT BREAST (MULTI): Primary | ICD-10-CM

## 2024-10-08 DIAGNOSIS — C50.911 ADENOCARCINOMA OF RIGHT BREAST (MULTI): ICD-10-CM

## 2024-10-08 PROCEDURE — 99211 OFF/OP EST MAY X REQ PHY/QHP: CPT | Performed by: SURGERY

## 2024-10-08 PROCEDURE — 1159F MED LIST DOCD IN RCRD: CPT | Performed by: SURGERY

## 2024-10-08 PROCEDURE — 1126F AMNT PAIN NOTED NONE PRSNT: CPT | Performed by: SURGERY

## 2024-10-08 PROCEDURE — 1036F TOBACCO NON-USER: CPT | Performed by: SURGERY

## 2024-10-08 ASSESSMENT — PATIENT HEALTH QUESTIONNAIRE - PHQ9
SUM OF ALL RESPONSES TO PHQ9 QUESTIONS 1 & 2: 0
1. LITTLE INTEREST OR PLEASURE IN DOING THINGS: NOT AT ALL
2. FEELING DOWN, DEPRESSED OR HOPELESS: NOT AT ALL

## 2024-10-08 ASSESSMENT — PAIN SCALES - GENERAL: PAINLEVEL: 0-NO PAIN

## 2024-10-08 ASSESSMENT — LIFESTYLE VARIABLES
AUDIT-C TOTAL SCORE: 4
HOW MANY STANDARD DRINKS CONTAINING ALCOHOL DO YOU HAVE ON A TYPICAL DAY: 1 OR 2
HOW OFTEN DO YOU HAVE SIX OR MORE DRINKS ON ONE OCCASION: NEVER
HOW OFTEN DO YOU HAVE A DRINK CONTAINING ALCOHOL: 4 OR MORE TIMES A WEEK
SKIP TO QUESTIONS 9-10: 1

## 2024-10-08 ASSESSMENT — COLUMBIA-SUICIDE SEVERITY RATING SCALE - C-SSRS
6. HAVE YOU EVER DONE ANYTHING, STARTED TO DO ANYTHING, OR PREPARED TO DO ANYTHING TO END YOUR LIFE?: NO
1. IN THE PAST MONTH, HAVE YOU WISHED YOU WERE DEAD OR WISHED YOU COULD GO TO SLEEP AND NOT WAKE UP?: NO
2. HAVE YOU ACTUALLY HAD ANY THOUGHTS OF KILLING YOURSELF?: NO

## 2024-10-08 ASSESSMENT — ENCOUNTER SYMPTOMS
OCCASIONAL FEELINGS OF UNSTEADINESS: 0
LOSS OF SENSATION IN FEET: 0
DEPRESSION: 0

## 2024-10-08 NOTE — ASSESSMENT & PLAN NOTE
Status post bilateral lumpectomies and sentinel node biopsies.  Both tumors were T2 N0 MX breast cancer.  Right side was ER positive DC HER2/nav negative left side was ER/DC positive HER2/nav negative.  Both were grade 2 out of 3.  Recommend follow-up with radiation oncology as well as oncology for discussion of further adjuvant therapy.  Patient needs to follow-up with me in 2-3 weeks for wound check

## 2024-10-08 NOTE — ASSESSMENT & PLAN NOTE
Status post bilateral lumpectomies and sentinel node biopsies.  Both tumors were T2 N0 MX breast cancer.  Right side was ER positive NV HER2/nav negative left side was ER/NV positive HER2/nav negative.  Both were grade 2 out of 3.  Recommend follow-up with radiation oncology as well as oncology for discussion of further adjuvant therapy.  Patient needs to follow-up with me in 2-3 weeks for wound check.

## 2024-10-10 DIAGNOSIS — C50.912 ADENOCARCINOMA OF LEFT BREAST (MULTI): Primary | ICD-10-CM

## 2024-10-10 DIAGNOSIS — C50.911 ADENOCARCINOMA OF RIGHT BREAST (MULTI): ICD-10-CM

## 2024-10-22 ENCOUNTER — APPOINTMENT (OUTPATIENT)
Dept: RADIATION ONCOLOGY | Facility: CLINIC | Age: 79
End: 2024-10-22
Payer: COMMERCIAL

## 2024-10-22 ENCOUNTER — APPOINTMENT (OUTPATIENT)
Dept: HEMATOLOGY/ONCOLOGY | Facility: CLINIC | Age: 79
End: 2024-10-22
Payer: COMMERCIAL

## 2024-10-22 DIAGNOSIS — M85.859 OSTEOPENIA OF NECK OF FEMUR, UNSPECIFIED LATERALITY: Primary | ICD-10-CM

## 2024-10-28 ENCOUNTER — HOSPITAL ENCOUNTER (OUTPATIENT)
Dept: RADIOLOGY | Facility: HOSPITAL | Age: 79
Discharge: HOME | End: 2024-10-28
Payer: COMMERCIAL

## 2024-10-28 DIAGNOSIS — M85.859 OSTEOPENIA OF NECK OF FEMUR, UNSPECIFIED LATERALITY: ICD-10-CM

## 2024-10-28 PROCEDURE — 77080 DXA BONE DENSITY AXIAL: CPT

## 2024-10-28 PROCEDURE — 77080 DXA BONE DENSITY AXIAL: CPT | Performed by: RADIOLOGY

## 2024-11-04 NOTE — PROGRESS NOTES
Subjective   Patient ID: Ayana Hernandez is a 79 y.o. female who presents for 3 week follow up.  HPI  Patient returns to clinic for 3 week follow up appointment.  Patient last seen in office on 10/8/2024 for S/P Bilateral Breast Lumpectomy with Magseed Localizations and Bilateral [place] Node Biopsy 9/23/2024.  Pathology results discussed with patient at 10/8/2024 appointment.  Patient found to have seroma to both Right Breast and Axilla on 10/8/2024 and was instructed to follow up for wound check.    Patient scheduled to see Dr. Benavidez and Dr. Smith on 11/12/2024 for consultations.  Oncotype is still pending    Social History:  Tobacco Use - former  Do you use any recreational drugs -no   Alcohol Use - rarely  Caffeine Intake - coffee 2daily  Working - retired  Marital status -   Living with -    Past Medical History:   Diagnosis Date    Adverse effect of anesthesia     ALMOST FAINTED AFTER GETTING UP TO BATHROOM. LOW BP    Delayed emergence from general anesthesia     Encounter for screening mammogram for malignant neoplasm of breast     Visit for screening mammogram    Hypertension     Personal history of other diseases of the musculoskeletal system and connective tissue     History of osteopenia    PONV (postoperative nausea and vomiting)      Past Surgical History:   Procedure Laterality Date    ABSCESS DRAINAGE Right     breast drained by dr. almeida    BREAST BIOPSY Bilateral 08/08/2024    BREAST LUMPECTOMY Right 2000    benign breast lump    BREAST LUMPECTOMY W/ NEEDLE LOCALIZATION Bilateral 09/23/2024    COLONOSCOPY  05/20/2013    Complete Colonoscopy    KNEE SURGERY  05/20/2013    Knee Surgery Left    KNEE SURGERY  05/20/2013    Knee Surgery Right    NOSE SURGERY      TONSILLECTOMY  05/20/2013    Tonsillectomy     Family History   Problem Relation Name Age of Onset    Lung cancer Mother      Lung cancer Father       Allergies   Allergen Reactions    Egg Other     STOMACH CRAMPS      Fentanyl Other     Drops blood pressure    Morphine Nausea/vomiting    Penicillins Anaphylaxis    Erythromycin Nausea/vomiting    Lisinopril Other     LOST EYE SIGHT IN LEFT EYE FOR 30 DAYS  STATES WITH HCTZ    Tetracyclines GI Upset       Review of Systems  /70   Pulse 76   Temp 36.6 °C (97.8 °F)   Resp 17   Ht 1.524 m (5')   Wt 59.9 kg (132 lb)   BMI 25.78 kg/m²     Objective   Physical Exam  Both lumpectomy incisions of healed nicely.  Left breast with better contour of the breast.  Right breast healed nicely no evidence of seroma in the breast very small seroma in the right axillary area.  No infections.  Assessment/Plan   Problem List Items Addressed This Visit             ICD-10-CM    Adenocarcinoma of right breast (Multi) C50.911     This is patient's second postop visit.  Both breast have now healed nicely.  No evidence of seroma on the right any longer.  Minimal seroma in the right axilla.  No treatment needed.  Patient is to follow-up in 6 months.  I will be ordering her films when appropriate.    Still waiting for Oncotype prior to meeting with oncology and radiation oncology.         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  No lymphedema             Breast History:  Patient is new to clinic and presents for Bilateral US guided breast biopsy 8/8/2024.  Patient indicates she had a palpable lump in her right breast for years.  Recently however she thinks it is increased in size.  She noticed something in the mirror when she looked at her breast and subsequently called.  Patient did not feel any lumps in her left breast until after the imaging.  Patient has a history of a right breast biopsy which sounds like an excisional biopsy in the year 2000 for benign disease.  Patient has a history of left breast abscess in 1993 that was taken care of as well.  Patient denies any nipple  discharge.  Patient had a maternal great grandmother had ovarian cancer in her 80s.  BI MAMMO BILATERAL DIAGNOSTIC TOMOSYNTHESIS; BI US BREAST LIMITED  BILATERAL;  8/2/2024 1:06 pm; 8/2/2024 2:26 pm      ACCESSION NUMBER(S):  [id number]; [id number]      ORDERING CLINICIAN:  CARMEN BARBER      INDICATION:  Signs/Symptoms:DIAG BILAT MAMMO W ZOHRA; Signs/Symptoms:US BREAST.  Palpable lump right breast upper-outer quadrant. Area of concern is  demarcated with an external skin marker.      COMPARISON:  2023, 2022, 2021, 2020, 2018      FINDINGS:  2D and tomosynthesis images were reviewed at 1 mm slice thickness.      Density:  The breast tissue is heterogeneously dense, which may  obscure small masses.      Right Breast :  *There is a subtle area of architectural distortion corresponding  with the palpable area of concern at the 12 o'clock position right  breast anterior depth.          Left Breast  :  *Coarse benign calcifications are scattered throughout the left  breast. There is a developing cluster of coarse calcifications within  the comp of dense tissue upper-outer quadrant left breast mid depth.  There is questionable increasing tissue density in this distribution  as well.              ULTRASOUND FINDINGS:  Patient was scanned both by the technologist as well as myself.  Right Breast :  *The area of palpable concern corresponds with an taller than wide  area of acoustic shadowing, with marked increased stiffness on  elastography vascular flow along the anterior margin. The most dense  area measures 0.8 x 0.7 x 1.2 cm although the upon further scanning  of this area may be extension to much broader area measuring up to  1.5 x 3.2 cm. This area of concern lies at the 12 o'clock position 4  cm from the nipple. *Axillary lymph nodes on the right are normal.          Left Breast  :  *The area of concern upper-outer quadrant left breast 7 cm from the  nipple at the 1 o'clock position corresponds with a  macrolobulated  heterogeneous mass with increased stiffness on elastography,  demonstrating some internal vascularity along with the coarse  calcifications noted mammographically. This area measures  approximately 1.2 x 2.0 x 2.7 cm. *Scanning of the axilla  demonstrates normal lymph nodes without lymphadenopathy.           Impression   Bilateral breast biopsy is recommended for the areas of concern  described above, palpable on the right at the 12 o'clock position and  within the upper-outer quadrant on the left at the 1 o'clock  position. Need for biopsy was discussed with the patient at the time  of the exam.      BI-RADS CATEGORY:      BI-RADS CATEGORY:  5 Highly Suggestive of Malignancy.  Recommendation:  Surgical Consultation and Biopsy.  Recommended Date:  Immediate.  Laterality:  Bilateral.      For any future breast imaging appointments, please call [phone]).          MACRO:  Critical Finding:  See findings. Notification was initiated on  8/2/2024 at 3:01 pm by  Cecil Kyle.  (**-[facility]-**) Instructions:  Surgical Consultation and Imaging Guided Biopsy.    Patient with palpable lesion right breast 12 o'clock position initially appeared to be a 1.2 cm but on further scanning may actually be an area of 3.2 cm.  Recommend ultrasound-guided biopsy.Patient to be scheduled for ultrasound guided  biopsy of the breast in the radiology department. Risk, benefits and alternatives to this plan were thoroughly discussed with the patient who agrees to proceed.  The procedure was also thoroughly discussed as well as her follow-up. She is to see me in the office in one week but I also will call as soon as I see the pathology which is usually 4 working days from procedure.   An extended period of time was spent with the patient and her  concerning her history her liver lack history as well as her severe anxiety when it comes to undergoing a biopsy.  I talked a significant amount of time just to talk her  into the biopsy.    Patient with a 2.7 cm radiographic abnormality left breast.  Recommend ultrasound-guided biopsy.Patient to be scheduled for ultrasound guided  biopsy of the breast in the radiology department. Risk, benefits and alternatives to this plan were thoroughly discussed with the patient who agrees to proceed.  The procedure was also thoroughly discussed as well as her follow-up. She is to see me in the office in one week but I also will call as soon as I see the pathology which is usually 4 working days from procedure.         Patient with bilateral breast cancer.  Left side showing 1 mm intraductal carcinoma ER/OH positive HER2/nav negative this on imaging measures 2.2 cm but on pathology only measures 1 mm of tumor.  In addition patient has an infiltrating lobular carcinoma found on the right side 12 o'clock position this measures approximately 2 cm on imaging.  This is ER positive OH HER2/nav negative. Recommend bilateral magseed  localization and lumpectomy with sentinal node biopsy verses mastectomy +/- reconstruction with sentinal node biopsy. Approximately 40 minutes spent with patient discussing her tumor, its characteristics and her treatment options. She will ultimately meet with oncology post surgery, and possibly radiation oncology depending on her surgical treatment choice and its outcome.  Risks benefits and alternatives to surgery were thoroughly discussed with the patient who agrees to proceed., Schedule at [facility].  Patient to have one more follow-up visit prior to surgery date to solidfy surgical plan and answer any questions and to review labs and xrays ordered.   Recommending MRI of the breast due to bilateral breast cancer.   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 No additional lesion seen on MRI. Patient scheduled for bilateral Magseed localization lumpectomy with bilateral sentinel node biopsies.  A long discussion was held with the patient today. We together reviewed her labs and pre-operative imaging. We reviewed in detail the step by step events of how her surgical day will proceed and what she can expect that day and post-operatively. Risks , benefits and alternatives to surgery were thoroughly discussed with the patient who agrees to proceed   Patient with a 3 cm radiographic abnormality right breast that has been confirmed invasive lobular carcinoma.  MRI without any other lesions.  Recommending bilateral Mag seed localization lumpectomy with bilateral sentinel node biopsies.  Risk benefits alternatives of doing the surgery were thoroughly discussed with the patient agrees to proceed. A long discussion was held with the patient today. We together reviewed her labs and pre-operative imaging. We reviewed in detail the step by step events of how her surgical day will proceed and what she can expect that day and post-operatively.  Risks , benefits and alternatives to surgery were thoroughly discussed with the patient who agrees to proceed   Patient returns to clinic S/P Bilateral Breast Lumpectomies with Magseed Localizations and Bilateral [place] Node Biopsies on 9/23/2024. Bilateral breast incisions healed nicely. Bilateral axillary incisions healed nicely. Patient with a seroma on the right side. Both in the breast and in the axilla. Good contour of both breasts.   Status post bilateral lumpectomies and sentinel node biopsies.  Both tumors were T2 N0 MX breast cancer.  Right side was ER positive UT HER2/nav negative left side was ER/UT positive HER2/nav negative.  Both were grade 2 out of 3.  Recommend follow-up with radiation oncology as well as oncology for discussion of further adjuvant therapy.  Patient needs to follow-up with me in 2-3 weeks for wound check.   Status post bilateral lumpectomies and sentinel node biopsies.  Both tumors were T2 N0 MX breast cancer.  Right side was ER positive UT  HER2/nav negative left side was ER/OH positive HER2/nav negative.  Both were grade 2 out of 3.  Recommend follow-up with radiation oncology as well as oncology for discussion of further adjuvant therapy.  Patient needs to follow-up with me in 2-3 weeks for wound check   Previous Biopsy: yes Menarche Age: 14 Age of first delivery: 21 Breastfeed: no Menopause age: 50's HRT: possibly- can't remember Family history of breast cancer: No Family history of ovarian cancer: Yes Family history of pancreatic cancer: No G 3 P 2    Dayan Hawthorne MD 11/05/24 2:59 PM

## 2024-11-05 ENCOUNTER — OFFICE VISIT (OUTPATIENT)
Dept: SURGERY | Facility: CLINIC | Age: 79
End: 2024-11-05
Payer: COMMERCIAL

## 2024-11-05 VITALS
HEIGHT: 60 IN | WEIGHT: 132 LBS | HEART RATE: 76 BPM | DIASTOLIC BLOOD PRESSURE: 70 MMHG | TEMPERATURE: 97.8 F | RESPIRATION RATE: 17 BRPM | BODY MASS INDEX: 25.91 KG/M2 | SYSTOLIC BLOOD PRESSURE: 128 MMHG

## 2024-11-05 DIAGNOSIS — C50.912 ADENOCARCINOMA OF LEFT BREAST (MULTI): Primary | ICD-10-CM

## 2024-11-05 DIAGNOSIS — C50.911 ADENOCARCINOMA OF RIGHT BREAST (MULTI): ICD-10-CM

## 2024-11-05 PROCEDURE — 99211 OFF/OP EST MAY X REQ PHY/QHP: CPT | Performed by: SURGERY

## 2024-11-05 PROCEDURE — 1126F AMNT PAIN NOTED NONE PRSNT: CPT | Performed by: SURGERY

## 2024-11-05 PROCEDURE — 1036F TOBACCO NON-USER: CPT | Performed by: SURGERY

## 2024-11-05 PROCEDURE — 1159F MED LIST DOCD IN RCRD: CPT | Performed by: SURGERY

## 2024-11-05 ASSESSMENT — LIFESTYLE VARIABLES
HOW OFTEN DO YOU HAVE A DRINK CONTAINING ALCOHOL: 4 OR MORE TIMES A WEEK
HOW OFTEN DO YOU HAVE SIX OR MORE DRINKS ON ONE OCCASION: NEVER
HOW MANY STANDARD DRINKS CONTAINING ALCOHOL DO YOU HAVE ON A TYPICAL DAY: 1 OR 2
SKIP TO QUESTIONS 9-10: 1
AUDIT-C TOTAL SCORE: 4

## 2024-11-05 ASSESSMENT — ENCOUNTER SYMPTOMS
LOSS OF SENSATION IN FEET: 0
DEPRESSION: 0
OCCASIONAL FEELINGS OF UNSTEADINESS: 0

## 2024-11-05 ASSESSMENT — PAIN SCALES - GENERAL: PAINLEVEL_OUTOF10: 0-NO PAIN

## 2024-11-05 NOTE — ASSESSMENT & PLAN NOTE
Left breast incision healed very nicely good contour of the breast axillary incision healed.  No lymphedema

## 2024-11-05 NOTE — ASSESSMENT & PLAN NOTE
This is patient's second postop visit.  Both breast have now healed nicely.  No evidence of seroma on the right any longer.  Minimal seroma in the right axilla.  No treatment needed.  Patient is to follow-up in 6 months.  I will be ordering her films when appropriate.    Still waiting for Oncotype prior to meeting with oncology and radiation oncology.

## 2024-11-07 LAB
AP SUMMARY REPORT: NORMAL
SCAN RESULT: NORMAL

## 2024-11-07 NOTE — PROGRESS NOTES
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 Hernandez is a 79 y.o. woman who palpated a mass in the right breast approximately 1 year ago, which subsequently increased in size around 2024. Of note, previous mammogram on 2023 showed bilaterally dense breast tissue, without mammographic evidence of malignancy. Targeted ultrasound and diagnostic mammogram on 2024 showed a 1.5 x 3.2 cm mass in the right breast at the 12 o'clock position, 4 cm from the nipple. Incidentally found on mammogram in the left breast, was an area of new calcifications. Ultrasound of the area on the left showed an area of calcifications which measured 1.2 x 2.0 x 2.7 cm at the 1 o'clock position, 7 cm from the nipple. No abnormal-appearing lymph nodes were  seen in either axilla. Ultrasound-guided biopsy of the masses in the right and left breasts was performed on 8/8/2024, with pathology from the right breast positive for invasive lobular carcinoma, grade 2, ER positive, WI and HER2/nav negative. In the left breast, pathology was positive for invasive ductal carcinoma, ER/WI positive, HER2/nav equivocal, negative on FISH.  Breast MRI was performed on 9/9/2024, and showed an irregular, enhancing, and spiculated mass in the central superior right breast, 3.1 x 1.7 x 2.9 cm in size. In the left breast, there was an irregular enhancing mass in the superior/lateral aspect, which measured 1.5 x 1.2 x 1.6 cm. There was no lymphadenopathy seen bilaterally. She then underwent a bilateral breast lumpectomy and sentinel node biopsy on 9/23/2024. In the right breast, there was a 3.6 cm invasive lobular carcinoma, grade 2, with negative margins (closest was 0.5 mm, anterior). There was no LVI. There was 1 sentinel node removed, which was negative for metastatic carcinoma. In the left breast, there was a 2.4 cm invasive ductal carcinoma, grade 2, with negative margins (closest was 1.46 mm). There was no LVI. There were 2 sentinel nodes removed, which were all negative for metastatic carcinoma. DCIS component was removed as well, grade 2, with negative margins (closest was greater than 2 mm). Her Oncotype score was 28 (left breast).    Currently, she overall feels well. She denies any breast pain or discomfort. She denies any breast masses or lesions. She denies any bleeding or wound difficulties. Of note, she reports a history of a benign breast mass removed from the right breast approximately 20 years ago.          The patient was seen for an opinion regarding radiation options for the diagnosis above. I personally reviewed the available outside records and imaging studies as detailed in the HPI. The allergies, medications, past medical history, surgical history, social history,  family history, and review of systems were reviewed.    Prior Radiotherapy:  No radiation treatments to show. (Treatments may have been administered in another system.)       Past Medical History:    Past Medical History:   Diagnosis Date    Adverse effect of anesthesia     ALMOST FAINTED AFTER GETTING UP TO BATHROOM. LOW BP    Delayed emergence from general anesthesia     Encounter for screening mammogram for malignant neoplasm of breast     Visit for screening mammogram    Hypertension     Personal history of other diseases of the musculoskeletal system and connective tissue     History of osteopenia    PONV (postoperative nausea and vomiting)     Thyroid disease     low        Past Surgical History:    Past Surgical History:   Procedure Laterality Date    ABSCESS DRAINAGE Right     breast drained by dr. almeida    BREAST BIOPSY Bilateral 2024    BREAST LUMPECTOMY Right 2000    benign breast lump    BREAST LUMPECTOMY W/ NEEDLE LOCALIZATION Bilateral 2024    COLONOSCOPY  2013    Complete Colonoscopy    KNEE SURGERY  2013    Knee Surgery Left    KNEE SURGERY  2013    Knee Surgery Right    NOSE SURGERY      TONSILLECTOMY  2013    Tonsillectomy        Family History:  Cancer-related family history includes Lung cancer in her father and mother.    Social History:    Social History     Tobacco Use    Smoking status: Former     Current packs/day: 0.00     Average packs/day: 1 pack/day for 29.0 years (29.0 ttl pk-yrs)     Types: Cigarettes     Start date: 1962     Quit date: 1991     Years since quittin.8     Passive exposure: Past    Smokeless tobacco: Never   Vaping Use    Vaping status: Never Used   Substance Use Topics    Alcohol use: Not Currently     Comment: ONE MONTH AGO LAST DRINK    Drug use: Never       Allergies:    Allergies   Allergen Reactions    Egg Other     STOMACH CRAMPS     Fentanyl Other     Drops blood pressure    Morphine Nausea/vomiting    Penicillins  Anaphylaxis    Erythromycin Nausea/vomiting    Lisinopril Other     LOST EYE SIGHT IN LEFT EYE FOR 30 DAYS  STATES WITH HCTZ    Tetracyclines GI Upset        Medications:    Current Outpatient Medications:     acetaminophen (Tylenol) 500 mg tablet, Take 1 tablet (500 mg) by mouth every 6 hours if needed for mild pain (1 - 3)., Disp: , Rfl:     alpha tocopherol (Vitamin E) 268 mg (400 unit) capsule, 1 capsule (400 Units) once every 24 hours., Disp: , Rfl:     amLODIPine (Norvasc) 5 mg tablet, Take 1 tablet (5 mg) by mouth once daily., Disp: , Rfl:     b complex 0.4 mg tablet, Take 1 tablet by mouth 1 (one) time per week in the early morning.., Disp: , Rfl:     levothyroxine (Synthroid, Levoxyl) 50 mcg tablet, Take 1 tablet (50 mcg) by mouth early in the morning.., Disp: , Rfl:     mv,Ca,min-iron-FA-lutein 18 mg iron-400 mcg-6 mg tablet, Take 1 tablet by mouth once daily., Disp: , Rfl:     omega 3-dha-epa-fish oil (Fish OiL) 1,000 mg (120 mg-180 mg) capsule, Take 2 capsules (2,000 mg) by mouth 2 times a day., Disp: , Rfl:     [START ON 11/25/2024] ondansetron (Zofran) 8 mg tablet, Take 1 tablet (8 mg) by mouth every 8 hours if needed for nausea or vomiting. Do not fill before November 25, 2024., Disp: 30 tablet, Rfl: 5    [START ON 11/25/2024] prochlorperazine (Compazine) 10 mg tablet, Take 1 tablet (10 mg) by mouth every 6 hours if needed for nausea or vomiting. Do not fill before November 25, 2024., Disp: 30 tablet, Rfl: 5      Review of Systems:  Review of Systems - Oncology    Performance Status:  The Karnofsky performance scale today is 90, Able to carry on normal activity; minor signs or symptoms of disease (ECOG equivalent 0).        OBJECTIVE  BP (!) 165/93 (Patient Position: Sitting)   Pulse 80   Temp 35.9 °C (96.6 °F)   Resp 18   Wt 59.4 kg (130 lb 13.5 oz)   SpO2 97%   BMI 25.55 kg/m²    Physical Exam  Vitals reviewed. Exam conducted with a chaperone present.   Constitutional:       General: She is  not in acute distress.     Appearance: Normal appearance. She is not ill-appearing.   HENT:      Head: Normocephalic and atraumatic.      Right Ear: External ear normal.      Left Ear: External ear normal.      Mouth/Throat:      Mouth: Mucous membranes are moist.      Pharynx: Oropharynx is clear.   Eyes:      Conjunctiva/sclera: Conjunctivae normal.   Cardiovascular:      Rate and Rhythm: Normal rate.   Pulmonary:      Effort: Pulmonary effort is normal. No respiratory distress.   Chest:   Breasts:     Right: No swelling, bleeding, mass, skin change or tenderness.      Left: No swelling, bleeding, mass, skin change or tenderness.          Comments: The breasts were examined in the supine and upright positions, and are overall symmetrical in appearance. The bilateral breasts have a well-healed lumpectomy incisions with a mildly palpable underlying seromas. Otherwise, the remainder of the breast exam exhibits normal breast tissue in the right and left breasts, without any discrete firmness, nodularity, or lesions to palpation. There is no other tenderness or palpable masses. The overlying skin is otherwise normal. There is no appreciable axillary lymphadenopathy in the right or left axilla.   Abdominal:      General: There is no distension.   Musculoskeletal:         General: Normal range of motion.      Cervical back: Normal range of motion and neck supple.   Lymphadenopathy:      Upper Body:      Right upper body: No axillary adenopathy.      Left upper body: No axillary adenopathy.   Skin:     General: Skin is warm and dry.   Neurological:      Mental Status: She is alert and oriented to person, place, and time.   Psychiatric:         Mood and Affect: Mood normal.         Behavior: Behavior normal.          Laboratory Review:  There are no laboratory contraindications to radiation therapy.    The pertinent lab results were reviewed and discussed with the patient.  Notably, per HPI      Pathology Review:  The  pertinent pathology results were reviewed and discussed with the patient.  Notably, per HPI     Imaging:  The pertinent imaging results were reviewed and discussed with the patient.  Notably, per HPI       ASSESSMENT:   Ms. Hernandez is a 79 y.o. 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      PLAN:   We had an extensive discussion regarding role of radiation in this setting.     We reviewed the standard of care for post-lumpectomy treatment for early stage breast cancer, which would include adjuvant radiotherapy per national guidelines. This would consist of hypofractionated radiotherapy, to a dose of 40.05 Gy in 15 fractions. We also discussed the possibility of treating her with ultra-hypofractionated whole breast radiation, to a dose of 26 Gy in 5 fractions. We explained that while long-term follow-up data is limited, the currently available data is encouraging, and it would be reasonable to consider in this setting. After discussion she would like to proceed with ultra-hypofractionated whole breast rpadiation. We reviewed that as her Oncotype score from the left breast was 28 (right breast, pending). If she undergoes systemic therapy, adjuvant radiation would be deferred until approximately 3 to 4 weeks after completion.    We discussed the acute side effects of therapies and  potential late toxicities. During the course of radiation, acute side effects could include, but are not limited to fatigue, dermatitis, or chest wall discomfort. The typical timeline for the resolution of these effects as well as available supportive therapies were reviewed. Potential long term side effects can include, but are not limited to, fibrosis, increased heart disease risk, rib fractures, radiation pneumonitis, and a small risk of second malignancies.     After the discussion, the patient was given the opportunity to ask questions which were subsequently answered, and our contact information was given.    Please contact our department with any further questions regarding the plan of management.    The length of the visit was 60 minutes. We spent more than 50% of this time discussing treatment options with the patient.    Recommendations:  - Ultra-hypofractionated whole breast radiation to a dose of 26 Gy in 5 fractions to the bilateral breasts  - CT simulation and follow-up approximately 3-4 weeks following completion of systemic therapy .  NCCN Guidelines were applicable to guide this patients treatment plan.   Maya Benavidez DO

## 2024-11-11 ENCOUNTER — TELEPHONE (OUTPATIENT)
Dept: HEMATOLOGY/ONCOLOGY | Facility: CLINIC | Age: 79
End: 2024-11-11
Payer: COMMERCIAL

## 2024-11-11 ENCOUNTER — APPOINTMENT (OUTPATIENT)
Dept: HEMATOLOGY/ONCOLOGY | Facility: CLINIC | Age: 79
End: 2024-11-11
Payer: COMMERCIAL

## 2024-11-12 ENCOUNTER — HOSPITAL ENCOUNTER (OUTPATIENT)
Dept: RADIATION ONCOLOGY | Facility: CLINIC | Age: 79
Setting detail: RADIATION/ONCOLOGY SERIES
Discharge: HOME | End: 2024-11-12
Payer: COMMERCIAL

## 2024-11-12 ENCOUNTER — OFFICE VISIT (OUTPATIENT)
Dept: HEMATOLOGY/ONCOLOGY | Facility: CLINIC | Age: 79
End: 2024-11-12
Payer: COMMERCIAL

## 2024-11-12 ENCOUNTER — PATIENT OUTREACH (OUTPATIENT)
Dept: HEMATOLOGY/ONCOLOGY | Facility: CLINIC | Age: 79
End: 2024-11-12
Payer: COMMERCIAL

## 2024-11-12 VITALS
OXYGEN SATURATION: 97 % | WEIGHT: 130.84 LBS | TEMPERATURE: 96.6 F | SYSTOLIC BLOOD PRESSURE: 165 MMHG | RESPIRATION RATE: 18 BRPM | DIASTOLIC BLOOD PRESSURE: 93 MMHG | HEART RATE: 80 BPM | BODY MASS INDEX: 25.55 KG/M2

## 2024-11-12 DIAGNOSIS — C50.912 ADENOCARCINOMA OF LEFT BREAST (MULTI): ICD-10-CM

## 2024-11-12 DIAGNOSIS — C50.911 ADENOCARCINOMA OF RIGHT BREAST (MULTI): ICD-10-CM

## 2024-11-12 DIAGNOSIS — C50.911 INFILTRATING LOBULAR CARCINOMA OF RIGHT BREAST IN FEMALE: Primary | ICD-10-CM

## 2024-11-12 DIAGNOSIS — C50.412 INFILTRATING DUCTAL CARCINOMA OF UPPER-OUTER QUADRANT OF LEFT BREAST IN FEMALE: ICD-10-CM

## 2024-11-12 DIAGNOSIS — C50.912 ADENOCARCINOMA OF LEFT BREAST (MULTI): Primary | ICD-10-CM

## 2024-11-12 PROBLEM — C50.811: Status: ACTIVE | Noted: 2024-08-20

## 2024-11-12 LAB
BASOPHILS # BLD AUTO: 0.03 X10*3/UL (ref 0–0.1)
BASOPHILS NFR BLD AUTO: 0.7 %
EOSINOPHIL # BLD AUTO: 0.07 X10*3/UL (ref 0–0.4)
EOSINOPHIL NFR BLD AUTO: 1.5 %
ERYTHROCYTE [DISTWIDTH] IN BLOOD BY AUTOMATED COUNT: 13.2 % (ref 11.5–14.5)
HCT VFR BLD AUTO: 40.7 % (ref 36–46)
HGB BLD-MCNC: 13.3 G/DL (ref 12–16)
IMM GRANULOCYTES # BLD AUTO: 0.01 X10*3/UL (ref 0–0.5)
IMM GRANULOCYTES NFR BLD AUTO: 0.2 % (ref 0–0.9)
LYMPHOCYTES # BLD AUTO: 1.38 X10*3/UL (ref 0.8–3)
LYMPHOCYTES NFR BLD AUTO: 30.4 %
MCH RBC QN AUTO: 28.7 PG (ref 26–34)
MCHC RBC AUTO-ENTMCNC: 32.7 G/DL (ref 32–36)
MCV RBC AUTO: 88 FL (ref 80–100)
MONOCYTES # BLD AUTO: 0.41 X10*3/UL (ref 0.05–0.8)
MONOCYTES NFR BLD AUTO: 9 %
NEUTROPHILS # BLD AUTO: 2.64 X10*3/UL (ref 1.6–5.5)
NEUTROPHILS NFR BLD AUTO: 58.2 %
NRBC BLD-RTO: 0 /100 WBCS (ref 0–0)
PLATELET # BLD AUTO: 230 X10*3/UL (ref 150–450)
RBC # BLD AUTO: 4.63 X10*6/UL (ref 4–5.2)
WBC # BLD AUTO: 4.5 X10*3/UL (ref 4.4–11.3)

## 2024-11-12 PROCEDURE — 80053 COMPREHEN METABOLIC PANEL: CPT | Performed by: INTERNAL MEDICINE

## 2024-11-12 PROCEDURE — 82728 ASSAY OF FERRITIN: CPT | Performed by: INTERNAL MEDICINE

## 2024-11-12 PROCEDURE — 85025 COMPLETE CBC W/AUTO DIFF WBC: CPT | Performed by: INTERNAL MEDICINE

## 2024-11-12 PROCEDURE — 99215 OFFICE O/P EST HI 40 MIN: CPT | Performed by: INTERNAL MEDICINE

## 2024-11-12 PROCEDURE — 99215 OFFICE O/P EST HI 40 MIN: CPT | Performed by: STUDENT IN AN ORGANIZED HEALTH CARE EDUCATION/TRAINING PROGRAM

## 2024-11-12 PROCEDURE — 82746 ASSAY OF FOLIC ACID SERUM: CPT | Performed by: INTERNAL MEDICINE

## 2024-11-12 PROCEDURE — 1159F MED LIST DOCD IN RCRD: CPT | Performed by: INTERNAL MEDICINE

## 2024-11-12 PROCEDURE — 1160F RVW MEDS BY RX/DR IN RCRD: CPT | Performed by: INTERNAL MEDICINE

## 2024-11-12 PROCEDURE — 99205 OFFICE O/P NEW HI 60 MIN: CPT | Performed by: STUDENT IN AN ORGANIZED HEALTH CARE EDUCATION/TRAINING PROGRAM

## 2024-11-12 PROCEDURE — 83540 ASSAY OF IRON: CPT | Performed by: INTERNAL MEDICINE

## 2024-11-12 PROCEDURE — 99205 OFFICE O/P NEW HI 60 MIN: CPT | Performed by: INTERNAL MEDICINE

## 2024-11-12 PROCEDURE — 82607 VITAMIN B-12: CPT | Performed by: INTERNAL MEDICINE

## 2024-11-12 PROCEDURE — 1036F TOBACCO NON-USER: CPT | Performed by: INTERNAL MEDICINE

## 2024-11-12 PROCEDURE — 99417 PROLNG OP E/M EACH 15 MIN: CPT | Performed by: STUDENT IN AN ORGANIZED HEALTH CARE EDUCATION/TRAINING PROGRAM

## 2024-11-12 PROCEDURE — 86300 IMMUNOASSAY TUMOR CA 15-3: CPT | Performed by: INTERNAL MEDICINE

## 2024-11-12 RX ORDER — FAMOTIDINE 10 MG/ML
20 INJECTION INTRAVENOUS ONCE AS NEEDED
Status: CANCELLED | OUTPATIENT
Start: 2024-12-14

## 2024-11-12 RX ORDER — ALBUTEROL SULFATE 0.83 MG/ML
3 SOLUTION RESPIRATORY (INHALATION) AS NEEDED
Status: CANCELLED | OUTPATIENT
Start: 2024-12-14

## 2024-11-12 RX ORDER — ONDANSETRON HYDROCHLORIDE 8 MG/1
8 TABLET, FILM COATED ORAL EVERY 8 HOURS PRN
Qty: 30 TABLET | Refills: 5 | Status: SHIPPED | OUTPATIENT
Start: 2024-11-25

## 2024-11-12 RX ORDER — PROCHLORPERAZINE MALEATE 10 MG
10 TABLET ORAL EVERY 6 HOURS PRN
OUTPATIENT
Start: 2024-12-13

## 2024-11-12 RX ORDER — PALONOSETRON 0.05 MG/ML
0.25 INJECTION, SOLUTION INTRAVENOUS ONCE
OUTPATIENT
Start: 2024-12-13

## 2024-11-12 RX ORDER — PROCHLORPERAZINE EDISYLATE 5 MG/ML
10 INJECTION INTRAMUSCULAR; INTRAVENOUS EVERY 6 HOURS PRN
OUTPATIENT
Start: 2024-11-22

## 2024-11-12 RX ORDER — DIPHENHYDRAMINE HYDROCHLORIDE 50 MG/ML
50 INJECTION INTRAMUSCULAR; INTRAVENOUS AS NEEDED
Status: CANCELLED | OUTPATIENT
Start: 2024-11-23

## 2024-11-12 RX ORDER — DIPHENHYDRAMINE HCL 25 MG
25 CAPSULE ORAL ONCE
OUTPATIENT
Start: 2024-11-22

## 2024-11-12 RX ORDER — PALONOSETRON 0.05 MG/ML
0.25 INJECTION, SOLUTION INTRAVENOUS ONCE
OUTPATIENT
Start: 2024-11-22

## 2024-11-12 RX ORDER — FAMOTIDINE 10 MG/ML
20 INJECTION INTRAVENOUS ONCE AS NEEDED
Status: CANCELLED | OUTPATIENT
Start: 2024-11-23

## 2024-11-12 RX ORDER — HEPARIN 100 UNIT/ML
500 SYRINGE INTRAVENOUS AS NEEDED
OUTPATIENT
Start: 2024-11-12

## 2024-11-12 RX ORDER — HEPARIN SODIUM,PORCINE/PF 10 UNIT/ML
50 SYRINGE (ML) INTRAVENOUS AS NEEDED
OUTPATIENT
Start: 2024-11-12

## 2024-11-12 RX ORDER — EPINEPHRINE 0.3 MG/.3ML
0.3 INJECTION SUBCUTANEOUS EVERY 5 MIN PRN
OUTPATIENT
Start: 2024-11-22

## 2024-11-12 RX ORDER — PROCHLORPERAZINE MALEATE 10 MG
10 TABLET ORAL EVERY 6 HOURS PRN
OUTPATIENT
Start: 2024-11-22

## 2024-11-12 RX ORDER — EPINEPHRINE 0.3 MG/.3ML
0.3 INJECTION SUBCUTANEOUS EVERY 5 MIN PRN
Status: CANCELLED | OUTPATIENT
Start: 2024-11-23

## 2024-11-12 RX ORDER — DIPHENHYDRAMINE HYDROCHLORIDE 50 MG/ML
50 INJECTION INTRAMUSCULAR; INTRAVENOUS AS NEEDED
Status: CANCELLED | OUTPATIENT
Start: 2024-12-14

## 2024-11-12 RX ORDER — ALBUTEROL SULFATE 0.83 MG/ML
3 SOLUTION RESPIRATORY (INHALATION) AS NEEDED
Status: CANCELLED | OUTPATIENT
Start: 2024-11-23

## 2024-11-12 RX ORDER — FAMOTIDINE 10 MG/ML
20 INJECTION INTRAVENOUS ONCE AS NEEDED
OUTPATIENT
Start: 2024-12-13

## 2024-11-12 RX ORDER — FAMOTIDINE 10 MG/ML
20 INJECTION INTRAVENOUS ONCE AS NEEDED
OUTPATIENT
Start: 2024-11-22

## 2024-11-12 RX ORDER — PROCHLORPERAZINE MALEATE 10 MG
10 TABLET ORAL EVERY 6 HOURS PRN
Qty: 30 TABLET | Refills: 5 | Status: SHIPPED | OUTPATIENT
Start: 2024-11-25

## 2024-11-12 RX ORDER — DIPHENHYDRAMINE HCL 25 MG
25 CAPSULE ORAL ONCE
OUTPATIENT
Start: 2024-12-13

## 2024-11-12 RX ORDER — EPINEPHRINE 0.3 MG/.3ML
0.3 INJECTION SUBCUTANEOUS EVERY 5 MIN PRN
Status: CANCELLED | OUTPATIENT
Start: 2024-12-14

## 2024-11-12 RX ORDER — ALBUTEROL SULFATE 0.83 MG/ML
3 SOLUTION RESPIRATORY (INHALATION) AS NEEDED
OUTPATIENT
Start: 2024-12-13

## 2024-11-12 RX ORDER — DIPHENHYDRAMINE HYDROCHLORIDE 50 MG/ML
50 INJECTION INTRAMUSCULAR; INTRAVENOUS AS NEEDED
OUTPATIENT
Start: 2024-11-22

## 2024-11-12 RX ORDER — EPINEPHRINE 0.3 MG/.3ML
0.3 INJECTION SUBCUTANEOUS EVERY 5 MIN PRN
OUTPATIENT
Start: 2024-12-13

## 2024-11-12 RX ORDER — PROCHLORPERAZINE EDISYLATE 5 MG/ML
10 INJECTION INTRAMUSCULAR; INTRAVENOUS EVERY 6 HOURS PRN
OUTPATIENT
Start: 2024-12-13

## 2024-11-12 RX ORDER — ALBUTEROL SULFATE 0.83 MG/ML
3 SOLUTION RESPIRATORY (INHALATION) AS NEEDED
OUTPATIENT
Start: 2024-11-22

## 2024-11-12 RX ORDER — DIPHENHYDRAMINE HYDROCHLORIDE 50 MG/ML
50 INJECTION INTRAMUSCULAR; INTRAVENOUS AS NEEDED
OUTPATIENT
Start: 2024-12-13

## 2024-11-12 SDOH — ECONOMIC STABILITY: FOOD INSECURITY: WITHIN THE PAST 12 MONTHS, THE FOOD YOU BOUGHT JUST DIDN'T LAST AND YOU DIDN'T HAVE MONEY TO GET MORE.: NEVER TRUE

## 2024-11-12 SDOH — ECONOMIC STABILITY: INCOME INSECURITY: IN THE LAST 12 MONTHS, WAS THERE A TIME WHEN YOU WERE NOT ABLE TO PAY THE MORTGAGE OR RENT ON TIME?: NO

## 2024-11-12 SDOH — HEALTH STABILITY: PHYSICAL HEALTH: ON AVERAGE, HOW MANY DAYS PER WEEK DO YOU ENGAGE IN MODERATE TO STRENUOUS EXERCISE (LIKE A BRISK WALK)?: 0 DAYS

## 2024-11-12 SDOH — ECONOMIC STABILITY: TRANSPORTATION INSECURITY
IN THE PAST 12 MONTHS, HAS THE LACK OF TRANSPORTATION KEPT YOU FROM MEDICAL APPOINTMENTS OR FROM GETTING MEDICATIONS?: NO

## 2024-11-12 SDOH — ECONOMIC STABILITY: FOOD INSECURITY: WITHIN THE PAST 12 MONTHS, YOU WORRIED THAT YOUR FOOD WOULD RUN OUT BEFORE YOU GOT MONEY TO BUY MORE.: NEVER TRUE

## 2024-11-12 SDOH — ECONOMIC STABILITY: TRANSPORTATION INSECURITY
IN THE PAST 12 MONTHS, HAS LACK OF TRANSPORTATION KEPT YOU FROM MEETINGS, WORK, OR FROM GETTING THINGS NEEDED FOR DAILY LIVING?: NO

## 2024-11-12 SDOH — HEALTH STABILITY: PHYSICAL HEALTH: ON AVERAGE, HOW MANY MINUTES DO YOU ENGAGE IN EXERCISE AT THIS LEVEL?: 0 MIN

## 2024-11-12 SDOH — ECONOMIC STABILITY: GENERAL
WHICH OF THE FOLLOWING WOULD YOU LIKE TO GET CONNECTED TO IN ORDER TO RECEIVE A DISCOUNT OR FOR FREE? (CHOOSE ALL THAT APPLY): NO ASSISTANCE NEEDED

## 2024-11-12 SDOH — ECONOMIC STABILITY: GENERAL
WHICH OF THE FOLLOWING DO YOU KNOW HOW TO USE AND HAVE ACCESS TO EVERY DAY? (CHOOSE ALL THAT APPLY): DESKTOP COMPUTER, LAPTOP COMPUTER, OR TABLET WITH BROADBAND INTERNET CONNECTION;SMARTPHONE WITH CELLULAR DATA PLAN

## 2024-11-12 ASSESSMENT — SOCIAL DETERMINANTS OF HEALTH (SDOH)
WITHIN THE LAST YEAR, HAVE YOU BEEN HUMILIATED OR EMOTIONALLY ABUSED IN OTHER WAYS BY YOUR PARTNER OR EX-PARTNER?: NO
WITHIN THE LAST YEAR, HAVE YOU BEEN AFRAID OF YOUR PARTNER OR EX-PARTNER?: NO
HOW OFTEN DO YOU ATTENT MEETINGS OF THE CLUB OR ORGANIZATION YOU BELONG TO?: PATIENT DECLINED
HOW HARD IS IT FOR YOU TO PAY FOR THE VERY BASICS LIKE FOOD, HOUSING, MEDICAL CARE, AND HEATING?: NOT HARD AT ALL
DO YOU BELONG TO ANY CLUBS OR ORGANIZATIONS SUCH AS CHURCH GROUPS UNIONS, FRATERNAL OR ATHLETIC GROUPS, OR SCHOOL GROUPS?: PATIENT DECLINED
WITHIN THE LAST YEAR, HAVE YOU BEEN HUMILIATED OR EMOTIONALLY ABUSED IN OTHER WAYS BY YOUR PARTNER OR EX-PARTNER?: NO
HOW HARD IS IT FOR YOU TO PAY FOR THE VERY BASICS LIKE FOOD, HOUSING, MEDICAL CARE, AND HEATING?: NOT HARD AT ALL
IN A TYPICAL WEEK, HOW MANY TIMES DO YOU TALK ON THE PHONE WITH FAMILY, FRIENDS, OR NEIGHBORS?: THREE TIMES A WEEK
WITHIN THE LAST YEAR, HAVE YOU BEEN KICKED, HIT, SLAPPED, OR OTHERWISE PHYSICALLY HURT BY YOUR PARTNER OR EX-PARTNER?: NO
HOW OFTEN DO YOU GET TOGETHER WITH FRIENDS OR RELATIVES?: THREE TIMES A WEEK
WITHIN THE LAST YEAR, HAVE TO BEEN RAPED OR FORCED TO HAVE ANY KIND OF SEXUAL ACTIVITY BY YOUR PARTNER OR EX-PARTNER?: NO
IN THE PAST 12 MONTHS, HAS THE ELECTRIC, GAS, OIL, OR WATER COMPANY THREATENED TO SHUT OFF SERVICE IN YOUR HOME?: NO
WITHIN THE LAST YEAR, HAVE YOU BEEN AFRAID OF YOUR PARTNER OR EX-PARTNER?: NO
WITHIN THE LAST YEAR, HAVE TO BEEN RAPED OR FORCED TO HAVE ANY KIND OF SEXUAL ACTIVITY BY YOUR PARTNER OR EX-PARTNER?: NO
WITHIN THE LAST YEAR, HAVE YOU BEEN KICKED, HIT, SLAPPED, OR OTHERWISE PHYSICALLY HURT BY YOUR PARTNER OR EX-PARTNER?: NO
HOW OFTEN DO YOU ATTEND CHURCH OR RELIGIOUS SERVICES?: PATIENT DECLINED

## 2024-11-12 ASSESSMENT — ENCOUNTER SYMPTOMS
DECREASED CONCENTRATION: 1
DEPRESSION: 0
HEMATOLOGIC/LYMPHATIC NEGATIVE: 1
SLEEP DISTURBANCE: 1
CARDIOVASCULAR NEGATIVE: 1
ENDOCRINE NEGATIVE: 1
NERVOUS/ANXIOUS: 1
GASTROINTESTINAL NEGATIVE: 1
RESPIRATORY NEGATIVE: 1
EYE PROBLEMS: 1
LOSS OF SENSATION IN FEET: 0
MUSCULOSKELETAL NEGATIVE: 1
OCCASIONAL FEELINGS OF UNSTEADINESS: 0
FATIGUE: 1
DIZZINESS: 1

## 2024-11-12 ASSESSMENT — NCCN CANCER DISTRESS MANAGEMENT
NCCN EMOTIONAL CONCERNS: 1
NCCN PHYSICAL CONCERNS: 3
NCCN PHYSICAL CONCERNS: 6

## 2024-11-12 ASSESSMENT — PAIN SCALES - GENERAL: PAINLEVEL_OUTOF10: 0-NO PAIN

## 2024-11-12 ASSESSMENT — PATIENT HEALTH QUESTIONNAIRE - PHQ9
SUM OF ALL RESPONSES TO PHQ9 QUESTIONS 1 AND 2: 0
2. FEELING DOWN, DEPRESSED OR HOPELESS: NOT AT ALL
1. LITTLE INTEREST OR PLEASURE IN DOING THINGS: NOT AT ALL

## 2024-11-12 ASSESSMENT — LIFESTYLE VARIABLES
HOW OFTEN DO YOU HAVE SIX OR MORE DRINKS ON ONE OCCASION: NEVER
HOW MANY STANDARD DRINKS CONTAINING ALCOHOL DO YOU HAVE ON A TYPICAL DAY: 1 OR 2
AUDIT-C TOTAL SCORE: 4
HOW OFTEN DO YOU HAVE A DRINK CONTAINING ALCOHOL: 4 OR MORE TIMES A WEEK
SKIP TO QUESTIONS 9-10: 1

## 2024-11-12 ASSESSMENT — COLUMBIA-SUICIDE SEVERITY RATING SCALE - C-SSRS: 1. IN THE PAST MONTH, HAVE YOU WISHED YOU WERE DEAD OR WISHED YOU COULD GO TO SLEEP AND NOT WAKE UP?: NO

## 2024-11-12 NOTE — PROGRESS NOTES
Radiation Oncology Nursing Note    Prior Radiotherapy:  No  No radiation treatments to show. (Treatments may have been administered in another system.)     Current Systemic Treatment:  No     Presence of Pacemaker or ICD:  No    History of Autoimmune or Connective Tissue Disorders:  No    Pain: The patient's current pain level was assessed.  They report currently having a pain of 0 out of 10.  They feel their pain is under control without the use of pain medications.    Review of Systems:  Review of Systems   Constitutional:  Positive for fatigue.   HENT:  Negative.     Eyes:  Positive for eye problems (glaucoma).   Respiratory: Negative.     Cardiovascular: Negative.    Gastrointestinal: Negative.    Endocrine: Negative.    Genitourinary: Negative.     Musculoskeletal: Negative.    Skin: Negative.    Neurological:  Positive for dizziness (occasional--due to stress).   Hematological: Negative.    Psychiatric/Behavioral:  Positive for decreased concentration (this week) and sleep disturbance (baseline). The patient is nervous/anxious.

## 2024-11-12 NOTE — TELEPHONE ENCOUNTER
[employer] and spoke with Representative Salvador to confirm that pt in fact has 2nd new primary cancer to site to be able to move forward with 2nd ONCOtype test.

## 2024-11-12 NOTE — PROGRESS NOTES
Pt seen in office today for a new patient  visit with Dr. Maciej Smith for management of her breast cancer. She has been referred to our office by Dr. Dayan Hawthorne. She is without complaints today and denies pain.     Medications, pharmacy preference and allergies were reviewed with patient and updated in the medical record by MD.     Per orders, she has been seen by radiation oncology and had an Oncotype completed. She is to have labs and an Ambry test obtained in office today. She is to start treatment of Taxotere/ cyclophosphamide every 3 weeks for 4 cycles. Consent for treatment obtained by Dr. Maciej Smith. Copy of consent has been printed and given to patient. An in person chemo class was conducted with patient. Black binder and red chemo bag and all contents reviewed.Asll questions have been answered and patient provided verbal teachback.    Our contact information was given to patient and they were encouraged to contact us with any questions or concerns.     Patient verbalized understanding and agreement regarding discussed information via verbal feedback. Pt escorted to scheduling.

## 2024-11-12 NOTE — PROGRESS NOTES
Patient ID: Ayana Hernandez is a 79 y.o. female.  Referring Physician: Dayan Hawthrone MD  [id number] [facility] Physician Inocencio Dick [address] [place] [address]  Primary Care Provider: West Perez MD  Referral Reason: bilateral breast cancer    Subjective:  No complaints    Heme/Onc History:  Ms. Hernandez is a 79 y.o. woman who palpated a mass in the right breast approximately 1 year ago, which subsequently increased in size around July of 2024. Of note, previous mammogram on 9/28/2023 showed bilaterally dense breast tissue, without mammographic evidence of malignancy. Targeted ultrasound and diagnostic mammogram on 8/2/2024 showed a 1.5 x 3.2 cm mass in the right breast at the 12 o'clock position, 4 cm from the nipple. Incidentally found on mammogram in the left breast, was an area of new calcifications. Ultrasound of the area on the left showed an area of calcifications which measured 1.2 x 2.0 x 2.7 cm at the 1 o'clock position, 7 cm from the nipple. No abnormal-appearing lymph nodes were seen in either axilla. Ultrasound-guided biopsy of the masses in the right and left breasts was performed on 8/8/2024, with pathology from the right breast positive for invasive lobular carcinoma, grade 2, ER positive, NJ and HER2/nav negative. In the left breast, pathology was positive for invasive ductal carcinoma, ER/NJ positive, HER2/nav equivocal, negative on FISH.  Breast MRI was performed on 9/9/2024, and showed an irregular, enhancing, and spiculated mass in the central superior right breast, 3.1 x 1.7 x 2.9 cm in size. In the left breast, there was an irregular enhancing mass in the superior/lateral aspect, which measured 1.5 x 1.2 x 1.6 cm. There was no lymphadenopathy seen bilaterally. She then underwent a bilateral breast lumpectomy and sentinel node biopsy on 9/23/2024. In the right breast, there was a 3.6 cm invasive lobular carcinoma, grade 2, with negative margins (closest was 0.5 mm, anterior). There  was no LVI. There was 1 sentinel node removed, which was negative for metastatic carcinoma. In the left breast, there was a 2.4 cm invasive ductal carcinoma, grade 2, with negative margins (closest was 1.46 mm). There was no LVI. There were 2 sentinel nodes removed, which were all negative for metastatic carcinoma. DCIS component was removed as well, grade 2, with negative margins (closest was greater than 2 mm). Her Oncotype score was 28.           Past Medical History:   Past Medical History:   Diagnosis Date    Adverse effect of anesthesia     ALMOST FAINTED AFTER GETTING UP TO BATHROOM. LOW BP    Delayed emergence from general anesthesia     Encounter for screening mammogram for malignant neoplasm of breast     Visit for screening mammogram    Hypertension     Personal history of other diseases of the musculoskeletal system and connective tissue     History of osteopenia    PONV (postoperative nausea and vomiting)     Thyroid disease     low     Social History:   Social History     Socioeconomic History    Marital status:      Spouse name: Not on file    Number of children: Not on file    Years of education: Not on file    Highest education level: Not on file   Occupational History    Not on file   Tobacco Use    Smoking status: Former     Current packs/day: 0.00     Average packs/day: 1 pack/day for 29.0 years (29.0 ttl pk-yrs)     Types: Cigarettes     Start date: 1962     Quit date: 1991     Years since quittin.8     Passive exposure: Past    Smokeless tobacco: Never   Vaping Use    Vaping status: Never Used   Substance and Sexual Activity    Alcohol use: Not Currently     Comment: ONE MONTH AGO LAST DRINK    Drug use: Never    Sexual activity: Defer   Other Topics Concern    Not on file   Social History Narrative    Not on file     Social Drivers of Health     Financial Resource Strain: Low Risk  (2024)    Overall Financial Resource Strain (CARDIA)     Difficulty of Paying Living  Expenses: Not hard at all   Food Insecurity: No Food Insecurity (11/12/2024)    Hunger Vital Sign     Worried About Running Out of Food in the Last Year: Never true     Ran Out of Food in the Last Year: Never true   Transportation Needs: No Transportation Needs (11/12/2024)    PRAPARE - Transportation     Lack of Transportation (Medical): No     Lack of Transportation (Non-Medical): No   Physical Activity: Inactive (11/12/2024)    Exercise Vital Sign     Days of Exercise per Week: 0 days     Minutes of Exercise per Session: 0 min   Stress: No Stress Concern Present (11/12/2024)    [employer] - Occupational Stress Questionnaire     Feeling of Stress : Not at all   Social Connections: Unknown (11/12/2024)    Social Connection and Isolation Panel [NHANES]     Frequency of Communication with Friends and Family: Three times a week     Frequency of Social Gatherings with Friends and Family: Three times a week     Attends Gnosticism Services: Patient declined     Active Member of Clubs or Organizations: Patient declined     Attends Club or Organization Meetings: Patient declined     Marital Status:    Intimate Partner Violence: Not At Risk (11/12/2024)    Humiliation, Afraid, Rape, and Kick questionnaire     Fear of Current or Ex-Partner: No     Emotionally Abused: No     Physically Abused: No     Sexually Abused: No   Housing Stability: Low Risk  (11/12/2024)    Housing Stability Vital Sign     Unable to Pay for Housing in the Last Year: No     Number of Times Moved in the Last Year: 0     Homeless in the Last Year: No     Surgical History:   Past Surgical History:   Procedure Laterality Date    ABSCESS DRAINAGE Right     breast drained by dr. almeida    BREAST BIOPSY Bilateral 08/08/2024    BREAST LUMPECTOMY Right 2000    benign breast lump    BREAST LUMPECTOMY W/ NEEDLE LOCALIZATION Bilateral 09/23/2024    COLONOSCOPY  05/20/2013    Complete Colonoscopy    KNEE SURGERY  05/20/2013     "Knee Surgery Left    KNEE SURGERY  05/20/2013    Knee Surgery Right    NOSE SURGERY      TONSILLECTOMY  05/20/2013    Tonsillectomy     Family History:   Family History   Problem Relation Name Age of Onset    Lung cancer Mother      Lung cancer Father        reports that she quit smoking about 33 years ago. Her smoking use included cigarettes. She started smoking about 62 years ago. She has a 29 pack-year smoking history. She has been exposed to tobacco smoke. She has never used smokeless tobacco.  Oncology Family history: Cancer-related family history includes Lung cancer in her father and mother.    Review Of Systems:  As stated per in HPI; otherwise all other 12 point ROS are negative    Physical Exam:  BP (!) 181/82 (BP Location: Left arm, Patient Position: Sitting, BP Cuff Size: Adult)   Ht (S) 1.494 m (4' 10.82\")   BMI 26.59 kg/m²   BSA: 1.57 meters squared  General: awake/alert/oriented x3, no distress, alert and cooperative  Head: Short hair fully covering scalp. Symmetric facial expressions  Eyes: PERRL, EOMI, clear sclera, eyebrows present.  Ears/Nose/Mouth/Throat:  Oral mucous membranes moist. No oral ulcers. No palpable pre/post-auricular lymph nodes  Neck: No palpable cervical chain lymph nodes  Respiratory: unlabored breathing on room air, good chest expansion, thorax symmetric  Cardio: Regular rate and rhythm, normal S1 and S2, radial pulses symmetric  GI: Nondistended, soft, non-tender abdomen  Musculoskeletal: Normal muscle bulk and tone, ROM intact, no joint swelling.  Rises from chair and walks unassisted.  Extremities: No ankle swelling, no arm or leg wounds  Neuro: Alert, cognition intact, speech normal. Facial expressions symmetric.  No motor deficits noted. Sensation intact to touch and hot/cold.   Able to stand from seated position unassisted and walks around the room unassisted.  Psychological: Appropriate mood and behavior.  Skin: Warm and dry, no lesions, no rashes  Breast:  There is no " appreciable breast masses, or axillary lymphadenopathy in the right or left axilla.     Results:  Diagnostic Results   Lab Results   Component Value Date    WBC 4.7 09/18/2024    HGB 14.2 09/18/2024    HCT 41.8 09/18/2024    MCV 88 09/18/2024     09/18/2024     Lab Results   Component Value Date    CALCIUM 10.1 09/18/2024     09/18/2024    K 4.4 09/18/2024    CO2 28 09/18/2024     09/18/2024    BUN 13 09/18/2024    CREATININE 0.78 09/18/2024       Current Outpatient Medications:     acetaminophen (Tylenol) 500 mg tablet, Take 1 tablet (500 mg) by mouth every 6 hours if needed for mild pain (1 - 3)., Disp: , Rfl:     alpha tocopherol (Vitamin E) 268 mg (400 unit) capsule, 1 capsule (400 Units) once every 24 hours., Disp: , Rfl:     amLODIPine (Norvasc) 5 mg tablet, Take 1 tablet (5 mg) by mouth once daily., Disp: , Rfl:     b complex 0.4 mg tablet, Take 1 tablet by mouth 1 (one) time per week in the early morning.., Disp: , Rfl:     levothyroxine (Synthroid, Levoxyl) 50 mcg tablet, Take 1 tablet (50 mcg) by mouth early in the morning.., Disp: , Rfl:     mv,Ca,min-iron-FA-lutein 18 mg iron-400 mcg-6 mg tablet, Take 1 tablet by mouth once daily., Disp: , Rfl:     omega 3-dha-epa-fish oil (Fish OiL) 1,000 mg (120 mg-180 mg) capsule, Take 2 capsules (2,000 mg) by mouth 2 times a day., Disp: , Rfl:     [START ON 11/25/2024] ondansetron (Zofran) 8 mg tablet, Take 1 tablet (8 mg) by mouth every 8 hours if needed for nausea or vomiting. Do not fill before November 25, 2024., Disp: 30 tablet, Rfl: 5    [START ON 11/25/2024] prochlorperazine (Compazine) 10 mg tablet, Take 1 tablet (10 mg) by mouth every 6 hours if needed for nausea or vomiting. Do not fill before November 25, 2024., Disp: 30 tablet, Rfl: 5     Radiology:    Pathology:    Assessment/Plan:  ? Bilateral Breast Cancer:    Ayana Hernandez is a 79 y.o. female with IDC of left breast, Stage IA (pT2, pN0(sn), cM0, G2, ER+, AR+, HER2-) and ILC of  right breast, Stage IIA (pT2, pN0(sn), cM0, G2, ER+, NH-, HER2-, Oncotype DX score: 28) s/p lumpectomies in 09/2024.    Oncotype from right breast is pending.    Adjuvant TC (%35 dose reduced per patient request) with Neulasta On pro support is planned followed by AI 5-10 years plus Ribociclib for 3 years (Tumor 2-5 cm, N0, hGrade 2 with high genomic risk) is recommended.    Ambry ordered    Baseline CA 27-29 and PET CT are ordered    2- Bone health: Baseline DEXA scan in 10/24 is WNL. Adjuvant Zometa q 6 months for 3-5 years after completion of chemotx is recommended to improve DFS    RTC on C2D1    Diagnoses and all orders for this visit:  Adenocarcinoma of left breast (Multi)  -     Referral to Hematology and Oncology  -     Iron and TIBC; Future  -     Vitamin B12; Future  -     Ferritin; Future  -     Comprehensive Metabolic Panel; Future  -     Folate; Future  -     CBC and Auto Differential; Future  -     Cancer Antigen 27-29; Future  -     NM PET CT bone skull base to mid thigh; Future  -     ondansetron (Zofran) 8 mg tablet; Take 1 tablet (8 mg) by mouth every 8 hours if needed for nausea or vomiting. Do not fill before November 25, 2024.  -     prochlorperazine (Compazine) 10 mg tablet; Take 1 tablet (10 mg) by mouth every 6 hours if needed for nausea or vomiting. Do not fill before November 25, 2024.  -     Infusion Appointment Request; Future  -     CBC and Auto Differential; Future  -     Comprehensive metabolic panel; Future  -     Hepatitis B surface antigen; Future  -     Hepatitis B Core Antibody, Total; Future  -     Hepatitis B surface antibody; Future  -     Infusion Appointment Request; Future  -     Clinic Appointment Request; Future  -     Infusion Appointment Request; Future  -     CBC and Auto Differential; Future  -     Comprehensive metabolic panel; Future  -     Infusion Appointment Request; Future  Adenocarcinoma of right breast (Multi)  -     Referral to Hematology and Oncology  -      Iron and TIBC; Future  -     Vitamin B12; Future  -     Ferritin; Future  -     Comprehensive Metabolic Panel; Future  -     Folate; Future  -     CBC and Auto Differential; Future  -     Cancer Antigen 27-29; Future  -     ambry; Ambry - Miscellaneous Genetics Test; Future  Other orders  -     heparin flush 10 unit/mL syringe 50 Units  -     heparin flush 100 unit/mL syringe 500 Units  -     alteplase (Cathflo Activase) injection 2 mg  -     diphenhydrAMINE (BENADryl) capsule 25 mg  -     dexAMETHasone (Decadron) in dextrose 5% 50 mL IV 12 mg  -     palonosetron (Aloxi) injection 250 mcg  -     prochlorperazine (Compazine) tablet 10 mg  -     prochlorperazine (Compazine) injection 10 mg  -     DOCEtaxeL (Taxotere) 90 mg in dextrose 5% 254.5 mL IV  -     cycloPHOSphamide (Cytoxan) 707 mg in sodium chloride 0.9% 135.35 mL IV  -     sodium chloride 0.9 % bolus 500 mL  -     dextrose 5 % in water (D5W) bolus 500 mL  -     diphenhydrAMINE (BENADryl) injection 50 mg  -     methylPREDNISolone sod succinate (SOLU-Medrol) 40 mg/mL injection 40 mg  -     famotidine PF (Pepcid) injection 20 mg  -     EPINEPHrine (Epipen) injection syringe 0.3 mg  -     albuterol 2.5 mg /3 mL (0.083 %) nebulizer solution 3 mL  -     pegfilgrastim-fpgk (Stimufend) injection 6 mg  -     sodium chloride 0.9 % bolus 500 mL  -     dextrose 5 % in water (D5W) bolus 500 mL  -     diphenhydrAMINE (BENADryl) injection 50 mg  -     methylPREDNISolone sod succinate (SOLU-Medrol) 40 mg/mL injection 40 mg  -     famotidine PF (Pepcid) injection 20 mg  -     EPINEPHrine (Epipen) injection syringe 0.3 mg  -     albuterol 2.5 mg /3 mL (0.083 %) nebulizer solution 3 mL  -     diphenhydrAMINE (BENADryl) capsule 25 mg  -     dexAMETHasone (Decadron) in dextrose 5% 50 mL IV 12 mg  -     palonosetron (Aloxi) injection 250 mcg  -     prochlorperazine (Compazine) tablet 10 mg  -     prochlorperazine (Compazine) injection 10 mg  -     DOCEtaxeL (Taxotere) 90 mg in  dextrose 5% 254.5 mL IV  -     cycloPHOSphamide (Cytoxan) 707 mg in sodium chloride 0.9% 135.35 mL IV  -     sodium chloride 0.9 % bolus 500 mL  -     dextrose 5 % in water (D5W) bolus 500 mL  -     diphenhydrAMINE (BENADryl) injection 50 mg  -     methylPREDNISolone sod succinate (SOLU-Medrol) 40 mg/mL injection 40 mg  -     famotidine PF (Pepcid) injection 20 mg  -     EPINEPHrine (Epipen) injection syringe 0.3 mg  -     albuterol 2.5 mg /3 mL (0.083 %) nebulizer solution 3 mL  -     pegfilgrastim-fpgk (Stimufend) injection 6 mg  -     sodium chloride 0.9 % bolus 500 mL  -     dextrose 5 % in water (D5W) bolus 500 mL  -     diphenhydrAMINE (BENADryl) injection 50 mg  -     methylPREDNISolone sod succinate (SOLU-Medrol) 40 mg/mL injection 40 mg  -     famotidine PF (Pepcid) injection 20 mg  -     EPINEPHrine (Epipen) injection syringe 0.3 mg  -     albuterol 2.5 mg /3 mL (0.083 %) nebulizer solution 3 mL       Performance Status: Asymptomatic    I spent more than 60 minutes for the patient today, including face-to-face conversation, pre-visit preparation, post-visit orders, and others.   Maciej Smith MD

## 2024-11-12 NOTE — PROGRESS NOTES
New patient here today with spouse to see Dr. Benavidez for right breast invasive lobular carcinoma, node (-), grade 2, ER 95% (+), VT (-), Her 2 (-) and left breast invasive ductal carcinoma, ER 95% (+), VT 40% (+), and Her 2 (-), Oncotype score 28. Patient states that she is able to raise her arms above here head without difficulty and she states she is well healed from surgery. Dr. Benavidez did perform a breast exam on patient with myself present. Per Dr. Benavidez, we will wait to hear if patient will be doing chemotherapy prior to radiation.   We did not review much information regarding radiation since she had an appointment with Med/Onc. We did review external beam radiation to the breast. We will review again on day of sim. Patient verbalizes understanding with verbal teach back. Pierre Waller MSN, RN, OCN

## 2024-11-13 VITALS — SYSTOLIC BLOOD PRESSURE: 181 MMHG | DIASTOLIC BLOOD PRESSURE: 82 MMHG | BODY MASS INDEX: 26.59 KG/M2 | HEIGHT: 59 IN

## 2024-11-13 LAB
ALBUMIN SERPL BCP-MCNC: 4.6 G/DL (ref 3.4–5)
ALP SERPL-CCNC: 60 U/L (ref 33–136)
ALT SERPL W P-5'-P-CCNC: 12 U/L (ref 7–45)
ANION GAP SERPL CALC-SCNC: 12 MMOL/L (ref 10–20)
AST SERPL W P-5'-P-CCNC: 25 U/L (ref 9–39)
BILIRUB SERPL-MCNC: 0.5 MG/DL (ref 0–1.2)
BUN SERPL-MCNC: 13 MG/DL (ref 6–23)
CALCIUM SERPL-MCNC: 10 MG/DL (ref 8.6–10.6)
CANCER AG27-29 SERPL-ACNC: 16.1 U/ML (ref 0–38.6)
CHLORIDE SERPL-SCNC: 102 MMOL/L (ref 98–107)
CO2 SERPL-SCNC: 30 MMOL/L (ref 21–32)
CREAT SERPL-MCNC: 0.71 MG/DL (ref 0.5–1.05)
EGFRCR SERPLBLD CKD-EPI 2021: 88 ML/MIN/1.73M*2
FERRITIN SERPL-MCNC: 83 NG/ML (ref 8–150)
FOLATE SERPL-MCNC: >24 NG/ML
GLUCOSE SERPL-MCNC: 98 MG/DL (ref 74–99)
IRON SATN MFR SERPL: 22 % (ref 25–45)
IRON SERPL-MCNC: 74 UG/DL (ref 35–150)
POTASSIUM SERPL-SCNC: 3.7 MMOL/L (ref 3.5–5.3)
PROT SERPL-MCNC: 7.5 G/DL (ref 6.4–8.2)
SODIUM SERPL-SCNC: 140 MMOL/L (ref 136–145)
TIBC SERPL-MCNC: 340 UG/DL (ref 240–445)
UIBC SERPL-MCNC: 266 UG/DL (ref 110–370)
VIT B12 SERPL-MCNC: 516 PG/ML (ref 211–911)

## 2024-11-15 ENCOUNTER — TELEPHONE (OUTPATIENT)
Dept: HEMATOLOGY/ONCOLOGY | Facility: CLINIC | Age: 79
End: 2024-11-15
Payer: COMMERCIAL

## 2024-11-15 NOTE — TELEPHONE ENCOUNTER
Returned call to patient as requested. She had questions regarding her collagen pills - NOT estrogen pills as she had incorrectly told Dr. Smith at  her initial visit. She was told that Dr. Smith would be made aware of this. She also wanted Dr. Smith to know that she has high cholesterol ( not on any medications) and she was told that Dr. Smith would be made aware. She has also asked for assistance with getting her PET scan scheduled and she was made aware that I will contact scheduling to get this scheduled. Patient verbalized understanding and agreement regarding discussed information via verbal feedback.

## 2024-11-18 ENCOUNTER — TELEPHONE (OUTPATIENT)
Dept: HEMATOLOGY/ONCOLOGY | Facility: CLINIC | Age: 79
End: 2024-11-18
Payer: COMMERCIAL

## 2024-11-18 DIAGNOSIS — C50.912 ADENOCARCINOMA OF LEFT BREAST (MULTI): ICD-10-CM

## 2024-11-18 RX ORDER — PROCHLORPERAZINE MALEATE 10 MG
10 TABLET ORAL EVERY 6 HOURS PRN
Qty: 30 TABLET | Refills: 5 | Status: SHIPPED | OUTPATIENT
Start: 2024-11-20

## 2024-11-18 RX ORDER — ONDANSETRON HYDROCHLORIDE 8 MG/1
8 TABLET, FILM COATED ORAL EVERY 8 HOURS PRN
Qty: 30 TABLET | Refills: 5 | Status: SHIPPED | OUTPATIENT
Start: 2024-11-20

## 2024-11-18 NOTE — TELEPHONE ENCOUNTER
Discussed Ayana's concerns with treatment and whether she should proceed with treatment.  She got stressed after reading the  SE sheets.  She decided to take her first cycle of chemotherapy and see how she tolerates it and go from there.  She jeffrey be here Friday for Cycle 1 Day1.    Dr. Wing please resend the ondansetron and compazine Rx and change the fill dates to 11-20.

## 2024-11-19 ENCOUNTER — TELEPHONE (OUTPATIENT)
Dept: HEMATOLOGY/ONCOLOGY | Facility: CLINIC | Age: 79
End: 2024-11-19
Payer: COMMERCIAL

## 2024-11-19 NOTE — TELEPHONE ENCOUNTER
Returned call to Ayana as requested. She had questions regarding her nausea prescriptions, her PET scan and if she was able to have her  accompany her to her infusions. We clarified her ordered prescriptions and how and when to take them. We discussed her PET scan and what it is for and why it had to be moved back 1 week (insurance is still in process of authorizing) and she was made aware that her  is always welcome to accompany her to each and every infusion appointment. Patient verbalized understanding and agreement regarding discussed information via verbal feedback.

## 2024-11-20 DIAGNOSIS — C50.912 ADENOCARCINOMA OF LEFT BREAST (MULTI): Primary | ICD-10-CM

## 2024-11-21 ENCOUNTER — APPOINTMENT (OUTPATIENT)
Dept: RADIOLOGY | Facility: HOSPITAL | Age: 79
End: 2024-11-21
Payer: COMMERCIAL

## 2024-11-22 ENCOUNTER — INFUSION (OUTPATIENT)
Dept: HEMATOLOGY/ONCOLOGY | Facility: CLINIC | Age: 79
End: 2024-11-22
Payer: COMMERCIAL

## 2024-11-22 VITALS
WEIGHT: 129.3 LBS | HEART RATE: 107 BPM | RESPIRATION RATE: 16 BRPM | DIASTOLIC BLOOD PRESSURE: 75 MMHG | OXYGEN SATURATION: 98 % | BODY MASS INDEX: 26.07 KG/M2 | SYSTOLIC BLOOD PRESSURE: 183 MMHG | TEMPERATURE: 98.2 F | HEIGHT: 59 IN

## 2024-11-22 DIAGNOSIS — C50.912 ADENOCARCINOMA OF LEFT BREAST (MULTI): ICD-10-CM

## 2024-11-22 DIAGNOSIS — C50.911 ADENOCARCINOMA OF RIGHT BREAST (MULTI): ICD-10-CM

## 2024-11-22 PROCEDURE — 2500000004 HC RX 250 GENERAL PHARMACY W/ HCPCS (ALT 636 FOR OP/ED): Mod: JZ,JG | Performed by: INTERNAL MEDICINE

## 2024-11-22 PROCEDURE — 96417 CHEMO IV INFUS EACH ADDL SEQ: CPT

## 2024-11-22 PROCEDURE — 2500000001 HC RX 250 WO HCPCS SELF ADMINISTERED DRUGS (ALT 637 FOR MEDICARE OP): Performed by: INTERNAL MEDICINE

## 2024-11-22 PROCEDURE — 96375 TX/PRO/DX INJ NEW DRUG ADDON: CPT | Mod: INF

## 2024-11-22 PROCEDURE — 96377 APPLICATON ON-BODY INJECTOR: CPT

## 2024-11-22 PROCEDURE — 96413 CHEMO IV INFUSION 1 HR: CPT

## 2024-11-22 RX ORDER — EPINEPHRINE 0.3 MG/.3ML
0.3 INJECTION SUBCUTANEOUS EVERY 5 MIN PRN
Status: DISCONTINUED | OUTPATIENT
Start: 2024-11-22 | End: 2024-11-22 | Stop reason: HOSPADM

## 2024-11-22 RX ORDER — DEXAMETHASONE 6 MG/1
12 TABLET ORAL ONCE
Status: COMPLETED | OUTPATIENT
Start: 2024-11-22 | End: 2024-11-22

## 2024-11-22 RX ORDER — PALONOSETRON 0.05 MG/ML
0.25 INJECTION, SOLUTION INTRAVENOUS ONCE
Status: COMPLETED | OUTPATIENT
Start: 2024-11-22 | End: 2024-11-22

## 2024-11-22 RX ORDER — PROCHLORPERAZINE MALEATE 10 MG
10 TABLET ORAL EVERY 6 HOURS PRN
Status: DISCONTINUED | OUTPATIENT
Start: 2024-11-22 | End: 2024-11-22 | Stop reason: HOSPADM

## 2024-11-22 RX ORDER — DIPHENHYDRAMINE HCL 25 MG
25 CAPSULE ORAL ONCE
Status: COMPLETED | OUTPATIENT
Start: 2024-11-22 | End: 2024-11-22

## 2024-11-22 RX ORDER — DIPHENHYDRAMINE HYDROCHLORIDE 50 MG/ML
50 INJECTION INTRAMUSCULAR; INTRAVENOUS AS NEEDED
Status: DISCONTINUED | OUTPATIENT
Start: 2024-11-22 | End: 2024-11-22 | Stop reason: HOSPADM

## 2024-11-22 RX ORDER — ALBUTEROL SULFATE 0.83 MG/ML
3 SOLUTION RESPIRATORY (INHALATION) AS NEEDED
Status: DISCONTINUED | OUTPATIENT
Start: 2024-11-22 | End: 2024-11-22 | Stop reason: HOSPADM

## 2024-11-22 RX ORDER — PROCHLORPERAZINE EDISYLATE 5 MG/ML
10 INJECTION INTRAMUSCULAR; INTRAVENOUS EVERY 6 HOURS PRN
Status: DISCONTINUED | OUTPATIENT
Start: 2024-11-22 | End: 2024-11-22 | Stop reason: HOSPADM

## 2024-11-22 RX ORDER — FAMOTIDINE 10 MG/ML
20 INJECTION INTRAVENOUS ONCE AS NEEDED
Status: DISCONTINUED | OUTPATIENT
Start: 2024-11-22 | End: 2024-11-22 | Stop reason: HOSPADM

## 2024-11-22 ASSESSMENT — PAIN SCALES - GENERAL: PAINLEVEL_OUTOF10: 0-NO PAIN

## 2024-11-22 NOTE — PROGRESS NOTES
Patient here for first dose chemo. Reviewed consent, labs, and weight, ready for treatment.   Tolerated first treatment without complication, reviewed schedule,denies questions or needs. On Pro applied.

## 2024-11-25 ENCOUNTER — TELEPHONE (OUTPATIENT)
Dept: HEMATOLOGY/ONCOLOGY | Facility: CLINIC | Age: 79
End: 2024-11-25
Payer: COMMERCIAL

## 2024-11-25 NOTE — TELEPHONE ENCOUNTER
Ayana stated she has a bilateral red rash under her breasts where they  hang down and touch her skin.  She will send a picture via [employer].  Pt unable to send picture.  Red rash under breast follows the breast outline.  By patient description probably yeast.

## 2024-11-26 LAB
COMMENTS - MP RESULT TYPE: NORMAL
SCAN RESULT: NORMAL

## 2024-11-29 ENCOUNTER — APPOINTMENT (OUTPATIENT)
Dept: RADIOLOGY | Facility: HOSPITAL | Age: 79
End: 2024-11-29
Payer: COMMERCIAL

## 2024-12-02 ENCOUNTER — TELEPHONE (OUTPATIENT)
Dept: HEMATOLOGY/ONCOLOGY | Facility: CLINIC | Age: 79
End: 2024-12-02
Payer: COMMERCIAL

## 2024-12-04 ENCOUNTER — HOSPITAL ENCOUNTER (OUTPATIENT)
Dept: RADIOLOGY | Facility: HOSPITAL | Age: 79
Discharge: HOME | End: 2024-12-04
Payer: COMMERCIAL

## 2024-12-04 DIAGNOSIS — C50.912 ADENOCARCINOMA OF LEFT BREAST (MULTI): ICD-10-CM

## 2024-12-04 PROCEDURE — 78306 BONE IMAGING WHOLE BODY: CPT | Performed by: RADIOLOGY

## 2024-12-04 PROCEDURE — 3430000001 HC RX 343 DIAGNOSTIC RADIOPHARMACEUTICALS: Performed by: INTERNAL MEDICINE

## 2024-12-04 PROCEDURE — 2550000001 HC RX 255 CONTRASTS: Performed by: INTERNAL MEDICINE

## 2024-12-04 PROCEDURE — 78306 BONE IMAGING WHOLE BODY: CPT

## 2024-12-04 PROCEDURE — A9503 TC99M MEDRONATE: HCPCS | Performed by: INTERNAL MEDICINE

## 2024-12-04 PROCEDURE — 74177 CT ABD & PELVIS W/CONTRAST: CPT | Performed by: RADIOLOGY

## 2024-12-04 PROCEDURE — 71260 CT THORAX DX C+: CPT | Performed by: RADIOLOGY

## 2024-12-04 PROCEDURE — 74177 CT ABD & PELVIS W/CONTRAST: CPT

## 2024-12-11 ENCOUNTER — LAB (OUTPATIENT)
Dept: LAB | Facility: CLINIC | Age: 79
End: 2024-12-11
Payer: COMMERCIAL

## 2024-12-11 DIAGNOSIS — C50.912 ADENOCARCINOMA OF LEFT BREAST (MULTI): ICD-10-CM

## 2024-12-11 LAB
ALBUMIN SERPL BCP-MCNC: 4.4 G/DL (ref 3.4–5)
ALP SERPL-CCNC: 75 U/L (ref 33–136)
ALT SERPL W P-5'-P-CCNC: 10 U/L (ref 7–45)
ANION GAP SERPL CALC-SCNC: 11 MMOL/L (ref 10–20)
AST SERPL W P-5'-P-CCNC: 15 U/L (ref 9–39)
BASOPHILS # BLD AUTO: 0.09 X10*3/UL (ref 0–0.1)
BASOPHILS NFR BLD AUTO: 1.3 %
BILIRUB SERPL-MCNC: 0.4 MG/DL (ref 0–1.2)
BUN SERPL-MCNC: 14 MG/DL (ref 6–23)
CALCIUM SERPL-MCNC: 9.6 MG/DL (ref 8.6–10.3)
CHLORIDE SERPL-SCNC: 103 MMOL/L (ref 98–107)
CO2 SERPL-SCNC: 30 MMOL/L (ref 21–32)
CREAT SERPL-MCNC: 0.7 MG/DL (ref 0.5–1.05)
EGFRCR SERPLBLD CKD-EPI 2021: 89 ML/MIN/1.73M*2
EOSINOPHIL # BLD AUTO: 0 X10*3/UL (ref 0–0.4)
EOSINOPHIL NFR BLD AUTO: 0 %
ERYTHROCYTE [DISTWIDTH] IN BLOOD BY AUTOMATED COUNT: 14.1 % (ref 11.5–14.5)
GLUCOSE SERPL-MCNC: 94 MG/DL (ref 74–99)
HCT VFR BLD AUTO: 36.7 % (ref 36–46)
HGB BLD-MCNC: 12 G/DL (ref 12–16)
IMM GRANULOCYTES # BLD AUTO: 0.03 X10*3/UL (ref 0–0.5)
IMM GRANULOCYTES NFR BLD AUTO: 0.4 % (ref 0–0.9)
LYMPHOCYTES # BLD AUTO: 1.02 X10*3/UL (ref 0.8–3)
LYMPHOCYTES NFR BLD AUTO: 14.8 %
MCH RBC QN AUTO: 28.9 PG (ref 26–34)
MCHC RBC AUTO-ENTMCNC: 32.7 G/DL (ref 32–36)
MCV RBC AUTO: 88 FL (ref 80–100)
MONOCYTES # BLD AUTO: 0.74 X10*3/UL (ref 0.05–0.8)
MONOCYTES NFR BLD AUTO: 10.7 %
NEUTROPHILS # BLD AUTO: 5.02 X10*3/UL (ref 1.6–5.5)
NEUTROPHILS NFR BLD AUTO: 72.8 %
NRBC BLD-RTO: 0 /100 WBCS (ref 0–0)
PLATELET # BLD AUTO: 382 X10*3/UL (ref 150–450)
POTASSIUM SERPL-SCNC: 4 MMOL/L (ref 3.5–5.3)
PROT SERPL-MCNC: 7 G/DL (ref 6.4–8.2)
RBC # BLD AUTO: 4.15 X10*6/UL (ref 4–5.2)
SODIUM SERPL-SCNC: 140 MMOL/L (ref 136–145)
WBC # BLD AUTO: 6.9 X10*3/UL (ref 4.4–11.3)

## 2024-12-11 PROCEDURE — 80053 COMPREHEN METABOLIC PANEL: CPT

## 2024-12-11 PROCEDURE — 85025 COMPLETE CBC W/AUTO DIFF WBC: CPT

## 2024-12-11 PROCEDURE — 36415 COLL VENOUS BLD VENIPUNCTURE: CPT

## 2024-12-13 ENCOUNTER — OFFICE VISIT (OUTPATIENT)
Dept: HEMATOLOGY/ONCOLOGY | Facility: CLINIC | Age: 79
End: 2024-12-13
Payer: COMMERCIAL

## 2024-12-13 ENCOUNTER — INFUSION (OUTPATIENT)
Dept: HEMATOLOGY/ONCOLOGY | Facility: CLINIC | Age: 79
End: 2024-12-13
Payer: COMMERCIAL

## 2024-12-13 VITALS
HEART RATE: 104 BPM | OXYGEN SATURATION: 98 % | SYSTOLIC BLOOD PRESSURE: 181 MMHG | RESPIRATION RATE: 17 BRPM | DIASTOLIC BLOOD PRESSURE: 76 MMHG | BODY MASS INDEX: 26.86 KG/M2 | WEIGHT: 132.17 LBS | TEMPERATURE: 97.7 F

## 2024-12-13 VITALS
DIASTOLIC BLOOD PRESSURE: 70 MMHG | WEIGHT: 130.84 LBS | SYSTOLIC BLOOD PRESSURE: 162 MMHG | BODY MASS INDEX: 26.59 KG/M2

## 2024-12-13 DIAGNOSIS — C50.912 ADENOCARCINOMA OF LEFT BREAST (MULTI): Primary | ICD-10-CM

## 2024-12-13 DIAGNOSIS — C50.912 ADENOCARCINOMA OF LEFT BREAST (MULTI): ICD-10-CM

## 2024-12-13 PROCEDURE — 96377 APPLICATON ON-BODY INJECTOR: CPT

## 2024-12-13 PROCEDURE — 99215 OFFICE O/P EST HI 40 MIN: CPT | Mod: 25 | Performed by: INTERNAL MEDICINE

## 2024-12-13 PROCEDURE — 2500000004 HC RX 250 GENERAL PHARMACY W/ HCPCS (ALT 636 FOR OP/ED): Performed by: INTERNAL MEDICINE

## 2024-12-13 PROCEDURE — 96413 CHEMO IV INFUSION 1 HR: CPT

## 2024-12-13 PROCEDURE — 96375 TX/PRO/DX INJ NEW DRUG ADDON: CPT | Mod: INF

## 2024-12-13 PROCEDURE — 2500000001 HC RX 250 WO HCPCS SELF ADMINISTERED DRUGS (ALT 637 FOR MEDICARE OP): Performed by: INTERNAL MEDICINE

## 2024-12-13 PROCEDURE — 99215 OFFICE O/P EST HI 40 MIN: CPT | Performed by: INTERNAL MEDICINE

## 2024-12-13 PROCEDURE — 1126F AMNT PAIN NOTED NONE PRSNT: CPT | Performed by: INTERNAL MEDICINE

## 2024-12-13 PROCEDURE — 96417 CHEMO IV INFUS EACH ADDL SEQ: CPT

## 2024-12-13 RX ORDER — ALBUTEROL SULFATE 0.83 MG/ML
3 SOLUTION RESPIRATORY (INHALATION) AS NEEDED
OUTPATIENT
Start: 2025-01-24

## 2024-12-13 RX ORDER — FAMOTIDINE 10 MG/ML
20 INJECTION INTRAVENOUS ONCE AS NEEDED
Status: DISCONTINUED | OUTPATIENT
Start: 2024-12-13 | End: 2024-12-13 | Stop reason: HOSPADM

## 2024-12-13 RX ORDER — HEPARIN 100 UNIT/ML
500 SYRINGE INTRAVENOUS AS NEEDED
OUTPATIENT
Start: 2024-12-13

## 2024-12-13 RX ORDER — FAMOTIDINE 10 MG/ML
20 INJECTION INTRAVENOUS ONCE AS NEEDED
OUTPATIENT
Start: 2025-01-03

## 2024-12-13 RX ORDER — PROCHLORPERAZINE MALEATE 10 MG
10 TABLET ORAL EVERY 6 HOURS PRN
OUTPATIENT
Start: 2025-01-24

## 2024-12-13 RX ORDER — HEPARIN SODIUM,PORCINE/PF 10 UNIT/ML
50 SYRINGE (ML) INTRAVENOUS AS NEEDED
OUTPATIENT
Start: 2024-12-13

## 2024-12-13 RX ORDER — PALONOSETRON 0.05 MG/ML
0.25 INJECTION, SOLUTION INTRAVENOUS ONCE
OUTPATIENT
Start: 2025-01-03

## 2024-12-13 RX ORDER — PROCHLORPERAZINE EDISYLATE 5 MG/ML
10 INJECTION INTRAMUSCULAR; INTRAVENOUS EVERY 6 HOURS PRN
OUTPATIENT
Start: 2025-01-24

## 2024-12-13 RX ORDER — FAMOTIDINE 10 MG/ML
20 INJECTION INTRAVENOUS ONCE AS NEEDED
OUTPATIENT
Start: 2025-01-24

## 2024-12-13 RX ORDER — PROCHLORPERAZINE MALEATE 10 MG
10 TABLET ORAL EVERY 6 HOURS PRN
Status: DISCONTINUED | OUTPATIENT
Start: 2024-12-13 | End: 2024-12-13 | Stop reason: HOSPADM

## 2024-12-13 RX ORDER — DEXAMETHASONE 6 MG/1
12 TABLET ORAL ONCE
Status: CANCELLED | OUTPATIENT
Start: 2024-12-13 | End: 2024-12-13

## 2024-12-13 RX ORDER — DIPHENHYDRAMINE HYDROCHLORIDE 50 MG/ML
50 INJECTION INTRAMUSCULAR; INTRAVENOUS AS NEEDED
OUTPATIENT
Start: 2025-01-24

## 2024-12-13 RX ORDER — HEPARIN 100 UNIT/ML
500 SYRINGE INTRAVENOUS AS NEEDED
Status: DISCONTINUED | OUTPATIENT
Start: 2024-12-13 | End: 2024-12-13 | Stop reason: HOSPADM

## 2024-12-13 RX ORDER — DEXAMETHASONE 6 MG/1
12 TABLET ORAL ONCE
Status: COMPLETED | OUTPATIENT
Start: 2024-12-13 | End: 2024-12-13

## 2024-12-13 RX ORDER — ALBUTEROL SULFATE 0.83 MG/ML
3 SOLUTION RESPIRATORY (INHALATION) AS NEEDED
OUTPATIENT
Start: 2025-01-03

## 2024-12-13 RX ORDER — HEPARIN SODIUM,PORCINE/PF 10 UNIT/ML
50 SYRINGE (ML) INTRAVENOUS AS NEEDED
Status: DISCONTINUED | OUTPATIENT
Start: 2024-12-13 | End: 2024-12-13 | Stop reason: HOSPADM

## 2024-12-13 RX ORDER — DIPHENHYDRAMINE HYDROCHLORIDE 50 MG/ML
50 INJECTION INTRAMUSCULAR; INTRAVENOUS AS NEEDED
Status: DISCONTINUED | OUTPATIENT
Start: 2024-12-13 | End: 2024-12-13 | Stop reason: HOSPADM

## 2024-12-13 RX ORDER — DIPHENHYDRAMINE HCL 25 MG
25 CAPSULE ORAL ONCE
OUTPATIENT
Start: 2025-01-24

## 2024-12-13 RX ORDER — PALONOSETRON 0.05 MG/ML
0.25 INJECTION, SOLUTION INTRAVENOUS ONCE
OUTPATIENT
Start: 2025-01-24

## 2024-12-13 RX ORDER — EPINEPHRINE 0.3 MG/.3ML
0.3 INJECTION SUBCUTANEOUS EVERY 5 MIN PRN
Status: DISCONTINUED | OUTPATIENT
Start: 2024-12-13 | End: 2024-12-13 | Stop reason: HOSPADM

## 2024-12-13 RX ORDER — PROCHLORPERAZINE EDISYLATE 5 MG/ML
10 INJECTION INTRAMUSCULAR; INTRAVENOUS EVERY 6 HOURS PRN
Status: DISCONTINUED | OUTPATIENT
Start: 2024-12-13 | End: 2024-12-13 | Stop reason: HOSPADM

## 2024-12-13 RX ORDER — PROCHLORPERAZINE EDISYLATE 5 MG/ML
10 INJECTION INTRAMUSCULAR; INTRAVENOUS EVERY 6 HOURS PRN
OUTPATIENT
Start: 2025-01-03

## 2024-12-13 RX ORDER — DIPHENHYDRAMINE HYDROCHLORIDE 50 MG/ML
50 INJECTION INTRAMUSCULAR; INTRAVENOUS AS NEEDED
OUTPATIENT
Start: 2025-01-03

## 2024-12-13 RX ORDER — DIPHENHYDRAMINE HCL 25 MG
25 CAPSULE ORAL ONCE
OUTPATIENT
Start: 2025-01-03

## 2024-12-13 RX ORDER — ALBUTEROL SULFATE 0.83 MG/ML
3 SOLUTION RESPIRATORY (INHALATION) AS NEEDED
Status: DISCONTINUED | OUTPATIENT
Start: 2024-12-13 | End: 2024-12-13 | Stop reason: HOSPADM

## 2024-12-13 RX ORDER — EPINEPHRINE 0.3 MG/.3ML
0.3 INJECTION SUBCUTANEOUS EVERY 5 MIN PRN
OUTPATIENT
Start: 2025-01-24

## 2024-12-13 RX ORDER — PROCHLORPERAZINE MALEATE 10 MG
10 TABLET ORAL EVERY 6 HOURS PRN
OUTPATIENT
Start: 2025-01-03

## 2024-12-13 RX ORDER — PALONOSETRON 0.05 MG/ML
0.25 INJECTION, SOLUTION INTRAVENOUS ONCE
Status: COMPLETED | OUTPATIENT
Start: 2024-12-13 | End: 2024-12-13

## 2024-12-13 RX ORDER — EPINEPHRINE 0.3 MG/.3ML
0.3 INJECTION SUBCUTANEOUS EVERY 5 MIN PRN
OUTPATIENT
Start: 2025-01-03

## 2024-12-13 RX ORDER — DIPHENHYDRAMINE HCL 25 MG
25 CAPSULE ORAL ONCE
Status: COMPLETED | OUTPATIENT
Start: 2024-12-13 | End: 2024-12-13

## 2024-12-13 ASSESSMENT — PAIN SCALES - GENERAL: PAINLEVEL_OUTOF10: 0-NO PAIN

## 2024-12-13 NOTE — PROGRESS NOTES
Patient ID: Ayana Hernandez is a 79 y.o. female.  Referring Physician: Maciej Smith MD  [address]  [address] [place] [address]  Primary Care Provider: West Perez MD  Referral Reason: bilateral breast cancer    Subjective:  No complaints    Heme/Onc History:  Ms. Hernandez is a 79 y.o. woman who palpated a mass in the right breast approximately 1 year ago, which subsequently increased in size around July of 2024. Of note, previous mammogram on 9/28/2023 showed bilaterally dense breast tissue, without mammographic evidence of malignancy. Targeted ultrasound and diagnostic mammogram on 8/2/2024 showed a 1.5 x 3.2 cm mass in the right breast at the 12 o'clock position, 4 cm from the nipple. Incidentally found on mammogram in the left breast, was an area of new calcifications. Ultrasound of the area on the left showed an area of calcifications which measured 1.2 x 2.0 x 2.7 cm at the 1 o'clock position, 7 cm from the nipple. No abnormal-appearing lymph nodes were seen in either axilla. Ultrasound-guided biopsy of the masses in the right and left breasts was performed on 8/8/2024, with pathology from the right breast positive for invasive lobular carcinoma, grade 2, ER positive, SC and HER2/nav negative. In the left breast, pathology was positive for invasive ductal carcinoma, ER/SC positive, HER2/nav equivocal, negative on FISH.  Breast MRI was performed on 9/9/2024, and showed an irregular, enhancing, and spiculated mass in the central superior right breast, 3.1 x 1.7 x 2.9 cm in size. In the left breast, there was an irregular enhancing mass in the superior/lateral aspect, which measured 1.5 x 1.2 x 1.6 cm. There was no lymphadenopathy seen bilaterally. She then underwent a bilateral breast lumpectomy and sentinel node biopsy on 9/23/2024. In the right breast, there was a 3.6 cm invasive lobular carcinoma, grade 2, with negative margins (closest was 0.5 mm, anterior). There was no LVI. There was 1 sentinel  node removed, which was negative for metastatic carcinoma. In the left breast, there was a 2.4 cm invasive ductal carcinoma, grade 2, with negative margins (closest was 1.46 mm). There was no LVI. There were 2 sentinel nodes removed, which were all negative for metastatic carcinoma. DCIS component was removed as well, grade 2, with negative margins (closest was greater than 2 mm). Her Oncotype score was 28.           Past Medical History:   Past Medical History:   Diagnosis Date    Adverse effect of anesthesia     ALMOST FAINTED AFTER GETTING UP TO BATHROOM. LOW BP    Delayed emergence from general anesthesia     Encounter for screening mammogram for malignant neoplasm of breast     Visit for screening mammogram    Hypertension     Personal history of other diseases of the musculoskeletal system and connective tissue     History of osteopenia    PONV (postoperative nausea and vomiting)     Thyroid disease     low     Social History:   Social History     Socioeconomic History    Marital status:      Spouse name: Not on file    Number of children: Not on file    Years of education: Not on file    Highest education level: Not on file   Occupational History    Not on file   Tobacco Use    Smoking status: Former     Current packs/day: 0.00     Average packs/day: 1 pack/day for 29.0 years (29.0 ttl pk-yrs)     Types: Cigarettes     Start date: 1962     Quit date: 1991     Years since quittin.9     Passive exposure: Past    Smokeless tobacco: Never   Vaping Use    Vaping status: Never Used   Substance and Sexual Activity    Alcohol use: Not Currently     Comment: ONE MONTH AGO LAST DRINK    Drug use: Never    Sexual activity: Defer   Other Topics Concern    Not on file   Social History Narrative    Not on file     Social Drivers of Health     Financial Resource Strain: Low Risk  (2024)    Overall Financial Resource Strain (CARDIA)     Difficulty of Paying Living Expenses: Not hard at all   Food  Insecurity: No Food Insecurity (11/12/2024)    Hunger Vital Sign     Worried About Running Out of Food in the Last Year: Never true     Ran Out of Food in the Last Year: Never true   Transportation Needs: No Transportation Needs (11/12/2024)    PRAPARE - Transportation     Lack of Transportation (Medical): No     Lack of Transportation (Non-Medical): No   Physical Activity: Inactive (11/12/2024)    Exercise Vital Sign     Days of Exercise per Week: 0 days     Minutes of Exercise per Session: 0 min   Stress: No Stress Concern Present (11/12/2024)    [employer] - Occupational Stress Questionnaire     Feeling of Stress : Not at all   Social Connections: Unknown (11/12/2024)    Social Connection and Isolation Panel [NHANES]     Frequency of Communication with Friends and Family: Three times a week     Frequency of Social Gatherings with Friends and Family: Three times a week     Attends Congregational Services: Patient declined     Active Member of Clubs or Organizations: Patient declined     Attends Club or Organization Meetings: Patient declined     Marital Status:    Intimate Partner Violence: Not At Risk (11/12/2024)    Humiliation, Afraid, Rape, and Kick questionnaire     Fear of Current or Ex-Partner: No     Emotionally Abused: No     Physically Abused: No     Sexually Abused: No   Housing Stability: Low Risk  (11/12/2024)    Housing Stability Vital Sign     Unable to Pay for Housing in the Last Year: No     Number of Times Moved in the Last Year: 0     Homeless in the Last Year: No     Surgical History:   Past Surgical History:   Procedure Laterality Date    ABSCESS DRAINAGE Right     breast drained by dr. almeida    BREAST BIOPSY Bilateral 08/08/2024    BREAST LUMPECTOMY Right 2000    benign breast lump    BREAST LUMPECTOMY W/ NEEDLE LOCALIZATION Bilateral 09/23/2024    COLONOSCOPY  05/20/2013    Complete Colonoscopy    KNEE SURGERY  05/20/2013    Knee Surgery Left    KNEE SURGERY   05/20/2013    Knee Surgery Right    NOSE SURGERY      TONSILLECTOMY  05/20/2013    Tonsillectomy     Family History:   Family History   Problem Relation Name Age of Onset    Lung cancer Mother      Lung cancer Father        reports that she quit smoking about 33 years ago. Her smoking use included cigarettes. She started smoking about 62 years ago. She has a 29 pack-year smoking history. She has been exposed to tobacco smoke. She has never used smokeless tobacco.  Oncology Family history: Cancer-related family history includes Lung cancer in her father and mother.    Review Of Systems:  As stated per in HPI; otherwise all other 12 point ROS are negative    Physical Exam:  BP (!) 181/76 (BP Location: Right arm, Patient Position: Sitting, BP Cuff Size: Adult long)   Pulse 104   Temp 36.5 °C (97.7 °F) (Temporal)   Resp 17   Wt 59.9 kg (132 lb 2.7 oz)   SpO2 98%   BMI 26.86 kg/m²   BSA: 1.58 meters squared  General: awake/alert/oriented x3, no distress, alert and cooperative  Head: Short hair fully covering scalp. Symmetric facial expressions  Eyes: PERRL, EOMI, clear sclera, eyebrows present.  Ears/Nose/Mouth/Throat:  Oral mucous membranes moist. No oral ulcers. No palpable pre/post-auricular lymph nodes  Neck: No palpable cervical chain lymph nodes  Respiratory: unlabored breathing on room air, good chest expansion, thorax symmetric  Cardio: Regular rate and rhythm, normal S1 and S2, radial pulses symmetric  GI: Nondistended, soft, non-tender abdomen  Musculoskeletal: Normal muscle bulk and tone, ROM intact, no joint swelling.  Rises from chair and walks unassisted.  Extremities: No ankle swelling, no arm or leg wounds  Neuro: Alert, cognition intact, speech normal. Facial expressions symmetric.  No motor deficits noted. Sensation intact to touch and hot/cold.   Able to stand from seated position unassisted and walks around the room unassisted.  Psychological: Appropriate mood and behavior.  Skin: Warm and dry,  no lesions, no rashes  Breast:  There is no appreciable breast masses, or axillary lymphadenopathy in the right or left axilla.     Results:  Diagnostic Results   Lab Results   Component Value Date    WBC 6.9 12/11/2024    HGB 12.0 12/11/2024    HCT 36.7 12/11/2024    MCV 88 12/11/2024     12/11/2024     Lab Results   Component Value Date    CALCIUM 9.6 12/11/2024     12/11/2024    K 4.0 12/11/2024    CO2 30 12/11/2024     12/11/2024    BUN 14 12/11/2024    CREATININE 0.70 12/11/2024    ALT 10 12/11/2024    AST 15 12/11/2024       Current Outpatient Medications:     acetaminophen (Tylenol) 500 mg tablet, Take 1 tablet (500 mg) by mouth every 6 hours if needed for mild pain (1 - 3)., Disp: , Rfl:     alpha tocopherol (Vitamin E) 268 mg (400 unit) capsule, 1 capsule (400 Units) once every 24 hours., Disp: , Rfl:     amLODIPine (Norvasc) 5 mg tablet, Take 1 tablet (5 mg) by mouth once daily., Disp: , Rfl:     b complex 0.4 mg tablet, Take 1 tablet by mouth 1 (one) time per week in the early morning.., Disp: , Rfl:     levothyroxine (Synthroid, Levoxyl) 50 mcg tablet, Take 1 tablet (50 mcg) by mouth early in the morning.., Disp: , Rfl:     mv,Ca,min-iron-FA-lutein 18 mg iron-400 mcg-6 mg tablet, Take 1 tablet by mouth once daily., Disp: , Rfl:     nystatin (Mycostatin) 100,000 unit/gram powder, Apply 1 Application topically 2 times a day., Disp: 30 g, Rfl: 0    omega 3-dha-epa-fish oil (Fish OiL) 1,000 mg (120 mg-180 mg) capsule, Take 2 capsules (2,000 mg) by mouth 2 times a day., Disp: , Rfl:     ondansetron (Zofran) 8 mg tablet, Take 1 tablet (8 mg) by mouth every 8 hours if needed for nausea or vomiting. Do not fill before November 20, 2024., Disp: 30 tablet, Rfl: 5    prochlorperazine (Compazine) 10 mg tablet, Take 1 tablet (10 mg) by mouth every 6 hours if needed for nausea or vomiting. Do not fill before November 20, 2024., Disp: 30 tablet, Rfl: 5      Radiology:    Pathology:    Assessment/Plan:  ? Bilateral Breast Cancer:    Ayana Hernandez is a 79 y.o. female with IDC of left breast, Stage IA (pT2, pN0(sn), cM0, G2, ER+, GA+, HER2-) and ILC of right breast, Stage IIA (pT2, pN0(sn), cM0, G2, ER+, GA-, HER2-, Oncotype DX score: 28) s/p lumpectomies in 09/2024.    Oncotype from right breast is is 24.    Adjuvant TC (%25 dose reduced per patient request) with Neulasta On pro support is planned followed by AI 5-10 years plus Ribociclib for 3 years (Tumor 2-5 cm, N0, Grade 2 with high genomic risk) is recommended.    Ambry negative.    C#1 TC given on 11/22/24. Tolerated very well  C#2 TC planned today. We will increase dose to %20 dose reduced    2- Bone health: Baseline DEXA scan in 10/24 is WNL. Adjuvant Zometa q 6 months for 3-5 years after completion of chemotx is recommended to improve DFS    RTC on C2D1    Diagnoses and all orders for this visit:  Adenocarcinoma of left breast (Multi)  -     Clinic Appointment Request       Performance Status: Asymptomatic    I spent more than 60 minutes for the patient today, including face-to-face conversation, pre-visit preparation, post-visit orders, and others.   Maciej Smith MD

## 2024-12-13 NOTE — PROGRESS NOTES
Pt seen in office today for a treatment follow up visit with Dr. Maciej Smith for management of her breast cancer .  She is  without complaints today and denies pain. She is scheduled to receive her treatment of TC today following her visit. She is accompanied today by her , Dimitris.    Medications, pharmacy preference and allergies were reviewed with patient and updated in the medical record by MD.     Per orders, labs were obtained on 12/11/24 and a CT chest and a bone scan were completed on 12/4/24 and these results are to be reviewed in office by MD with patient.Oncotype and Ambry results are also to be reviewed today during visit. Future treatments, labs and FUVs are to be scheduled per treatment protocols.    Our contact information was given to patient and they were encouraged to contact us with any questions or concerns.     Patient verbalized understanding and agreement regarding discussed information via verbal feedback. Pt escorted to Day treatment.

## 2024-12-30 NOTE — PROGRESS NOTES
Patient ID: Ayana Hernandez is a 79 y.o. female.  Referring Physician: Maciej Smith MD  [address]  [facility] [place] [address]  Primary Care Provider: West Perez MD  Referral Reason: bilateral breast cancer    Subjective:  Ms. Hernandez presents today for routine follow up prior to C3 of TC. Tolerating treatment well. However she expresses concern over the need for chemotherapy. She stated being more scared of the chemotherapy than the tcancer recurring. States she would like a second option prior to proceeding with any further treatment     Heme/Onc History:  Ms. Hernandez is a 79 y.o. woman who palpated a mass in the right breast approximately 1 year ago, which subsequently increased in size around July of 2024. Of note, previous mammogram on 9/28/2023 showed bilaterally dense breast tissue, without mammographic evidence of malignancy. Targeted ultrasound and diagnostic mammogram on 8/2/2024 showed a 1.5 x 3.2 cm mass in the right breast at the 12 o'clock position, 4 cm from the nipple. Incidentally found on mammogram in the left breast, was an area of new calcifications. Ultrasound of the area on the left showed an area of calcifications which measured 1.2 x 2.0 x 2.7 cm at the 1 o'clock position, 7 cm from the nipple. No abnormal-appearing lymph nodes were seen in either axilla. Ultrasound-guided biopsy of the masses in the right and left breasts was performed on 8/8/2024, with pathology from the right breast positive for invasive lobular carcinoma, grade 2, ER positive, [place] and HER2/nav negative. In the left breast, pathology was positive for invasive ductal carcinoma, ER/AZ positive, HER2/nav equivocal, negative on FISH.  Breast MRI was performed on 9/9/2024, and showed an irregular, enhancing, and spiculated mass in the central superior right breast, 3.1 x 1.7 x 2.9 cm in size. In the left breast, there was an irregular enhancing mass in the superior/lateral aspect, which measured 1.5 x 1.2 x 1.6  cm. There was no lymphadenopathy seen bilaterally. She then underwent a bilateral breast lumpectomy and sentinel node biopsy on 2024. In the right breast, there was a 3.6 cm invasive lobular carcinoma, grade 2, with negative margins (closest was 0.5 mm, anterior). There was no LVI. There was 1 sentinel node removed, which was negative for metastatic carcinoma. In the left breast, there was a 2.4 cm invasive ductal carcinoma, grade 2, with negative margins (closest was 1.46 mm). There was no LVI. There were 2 sentinel nodes removed, which were all negative for metastatic carcinoma. DCIS component was removed as well, grade 2, with negative margins (closest was greater than 2 mm). Her Oncotype score was 28.     Past Medical History:   Past Medical History:   Diagnosis Date    Adverse effect of anesthesia     ALMOST FAINTED AFTER GETTING UP TO BATHROOM. LOW BP    Delayed emergence from general anesthesia     Encounter for screening mammogram for malignant neoplasm of breast     Visit for screening mammogram    Hypertension     Personal history of other diseases of the musculoskeletal system and connective tissue     History of osteopenia    PONV (postoperative nausea and vomiting)     Thyroid disease     low     Social History:   Social History     Socioeconomic History    Marital status:      Spouse name: Not on file    Number of children: Not on file    Years of education: Not on file    Highest education level: Not on file   Occupational History    Not on file   Tobacco Use    Smoking status: Former     Current packs/day: 0.00     Average packs/day: 1 pack/day for 29.0 years (29.0 ttl pk-yrs)     Types: Cigarettes     Start date: 1962     Quit date: 1991     Years since quittin.0     Passive exposure: Past    Smokeless tobacco: Never   Vaping Use    Vaping status: Never Used   Substance and Sexual Activity    Alcohol use: Not Currently     Comment: ONE MONTH AGO LAST DRINK    Drug use: Never     Sexual activity: Defer   Other Topics Concern    Not on file   Social History Narrative    Not on file     Social Drivers of Health     Financial Resource Strain: Low Risk  (11/12/2024)    Overall Financial Resource Strain (CARDIA)     Difficulty of Paying Living Expenses: Not hard at all   Food Insecurity: No Food Insecurity (11/12/2024)    Hunger Vital Sign     Worried About Running Out of Food in the Last Year: Never true     Ran Out of Food in the Last Year: Never true   Transportation Needs: No Transportation Needs (11/12/2024)    PRAPARE - Transportation     Lack of Transportation (Medical): No     Lack of Transportation (Non-Medical): No   Physical Activity: Inactive (11/12/2024)    Exercise Vital Sign     Days of Exercise per Week: 0 days     Minutes of Exercise per Session: 0 min   Stress: No Stress Concern Present (11/12/2024)    [employer] - Occupational Stress Questionnaire     Feeling of Stress : Not at all   Social Connections: Unknown (11/12/2024)    Social Connection and Isolation Panel [NHANES]     Frequency of Communication with Friends and Family: Three times a week     Frequency of Social Gatherings with Friends and Family: Three times a week     Attends Hinduism Services: Patient declined     Active Member of Clubs or Organizations: Patient declined     Attends Club or Organization Meetings: Patient declined     Marital Status:    Intimate Partner Violence: Not At Risk (11/12/2024)    Humiliation, Afraid, Rape, and Kick questionnaire     Fear of Current or Ex-Partner: No     Emotionally Abused: No     Physically Abused: No     Sexually Abused: No   Housing Stability: Low Risk  (11/12/2024)    Housing Stability Vital Sign     Unable to Pay for Housing in the Last Year: No     Number of Times Moved in the Last Year: 0     Homeless in the Last Year: No     Surgical History:   Past Surgical History:   Procedure Laterality Date    ABSCESS DRAINAGE Right      breast drained by dr. almeida    BREAST BIOPSY Bilateral 08/08/2024    BREAST LUMPECTOMY Right 2000    benign breast lump    BREAST LUMPECTOMY W/ NEEDLE LOCALIZATION Bilateral 09/23/2024    COLONOSCOPY  05/20/2013    Complete Colonoscopy    KNEE SURGERY  05/20/2013    Knee Surgery Left    KNEE SURGERY  05/20/2013    Knee Surgery Right    NOSE SURGERY      TONSILLECTOMY  05/20/2013    Tonsillectomy     Family History:   Family History   Problem Relation Name Age of Onset    Lung cancer Mother      Lung cancer Father        reports that she quit smoking about 34 years ago. Her smoking use included cigarettes. She started smoking about 63 years ago. She has a 29 pack-year smoking history. She has been exposed to tobacco smoke. She has never used smokeless tobacco.  Oncology Family history: Cancer-related family history includes Lung cancer in her father and mother.    Review Of Systems:  As stated per in HPI; otherwise all other 12 point ROS are negative    Physical Exam:  There were no vitals taken for this visit.  BSA: There is no height or weight on file to calculate BSA.  General: awake/alert/oriented x3, no distress, alert and cooperative  Head: Short hair fully covering scalp. Symmetric facial expressions  Eyes: PERRL, EOMI, clear sclera, eyebrows present.  Ears/Nose/Mouth/Throat:  Oral mucous membranes moist. No oral ulcers. No palpable pre/post-auricular lymph nodes  Neck: No palpable cervical chain lymph nodes  Respiratory: unlabored breathing on room air, good chest expansion, thorax symmetric  Cardio: Regular rate and rhythm, normal S1 and S2, radial pulses symmetric  GI: Nondistended, soft, non-tender abdomen  Musculoskeletal: Normal muscle bulk and tone, ROM intact, no joint swelling.  Rises from chair and walks unassisted.  Extremities: No ankle swelling, no arm or leg wounds  Neuro: Alert, cognition intact, speech normal. Facial expressions symmetric.  No motor deficits noted. Sensation intact to touch  and hot/cold.   Able to stand from seated position unassisted and walks around the room unassisted.  Psychological: Appropriate mood and behavior.  Skin: Warm and dry, no lesions, no rashes  Breast:  There is no appreciable breast masses, or axillary lymphadenopathy in the right or left axilla.     Results:  Diagnostic Results   Lab Results   Component Value Date    WBC 6.9 12/11/2024    HGB 12.0 12/11/2024    HCT 36.7 12/11/2024    MCV 88 12/11/2024     12/11/2024     Lab Results   Component Value Date    CALCIUM 9.6 12/11/2024     12/11/2024    K 4.0 12/11/2024    CO2 30 12/11/2024     12/11/2024    BUN 14 12/11/2024    CREATININE 0.70 12/11/2024    ALT 10 12/11/2024    AST 15 12/11/2024       Current Outpatient Medications:     acetaminophen (Tylenol) 500 mg tablet, Take 1 tablet (500 mg) by mouth every 6 hours if needed for mild pain (1 - 3)., Disp: , Rfl:     alpha tocopherol (Vitamin E) 268 mg (400 unit) capsule, 1 capsule (400 Units) once every 24 hours., Disp: , Rfl:     amLODIPine (Norvasc) 5 mg tablet, Take 1 tablet (5 mg) by mouth once daily., Disp: , Rfl:     b complex 0.4 mg tablet, Take 1 tablet by mouth 1 (one) time per week in the early morning.., Disp: , Rfl:     levothyroxine (Synthroid, Levoxyl) 50 mcg tablet, Take 1 tablet (50 mcg) by mouth early in the morning.., Disp: , Rfl:     mv,Ca,min-iron-FA-lutein 18 mg iron-400 mcg-6 mg tablet, Take 1 tablet by mouth once daily., Disp: , Rfl:     nystatin (Mycostatin) 100,000 unit/gram powder, Apply 1 Application topically 2 times a day., Disp: 30 g, Rfl: 0    omega 3-dha-epa-fish oil (Fish OiL) 1,000 mg (120 mg-180 mg) capsule, Take 2 capsules (2,000 mg) by mouth 2 times a day., Disp: , Rfl:     ondansetron (Zofran) 8 mg tablet, Take 1 tablet (8 mg) by mouth every 8 hours if needed for nausea or vomiting. Do not fill before November 20, 2024., Disp: 30 tablet, Rfl: 5    prochlorperazine (Compazine) 10 mg tablet, Take 1 tablet (10 mg)  by mouth every 6 hours if needed for nausea or vomiting. Do not fill before November 20, 2024., Disp: 30 tablet, Rfl: 5     Radiology:    Pathology:    Assessment/Plan:  ? Bilateral Breast Cancer:    Ayana Hernandez is a 79 y.o. female with IDC of left breast, Stage IA (pT2, pN0(sn), cM0, G2, ER+, VT+, HER2-) and ILC of right breast, Stage IIA (pT2, pN0(sn), cM0, G2, ER+, VT-, HER2-, Oncotype DX score: 28) s/p lumpectomies in 09/2024.    Oncotype from right breast is is 24.    Adjuvant TC (%25 dose reduced per patient request) with Neulasta On pro support is planned followed by AI 5-10 years plus Ribociclib for 3 years (Tumor 2-5 cm, N0, Grade 2 with high genomic risk) is recommended.    Ambry negative.    C#1 TC given on 11/22/24. Tolerated very well  C#2 TC given on 12/13/2024. We will increase dose to %20 dose reduced  C#3 TC to be given 1/3 - deferred to next week pending her decision on continuing     - I have reached out to Doc of the day Dr. Daniel + looking at NCCN guidelines for which she qualifies for chemotherapy. However, decision was made to cancel infusion tomorrow and consider rescheduling to next week once she discusses it more with her . Available for any questions or concerns.     2- Bone health: Baseline DEXA scan in 10/24 is WNL. Adjuvant Zometa q 6 months for 3-5 years after completion of chemotx is recommended to improve DFS    RTC next week possibly for infusion and prior to C4 in 3 weeks     There are no diagnoses linked to this encounter.       Performance Status: Asymptomatic    I spent more than 40 minutes for the patient today, including face-to-face conversation, pre-visit preparation, post-visit orders, and others.   Rachel Caro PA-C

## 2024-12-31 RX ORDER — DEXAMETHASONE 6 MG/1
12 TABLET ORAL ONCE
Status: CANCELLED | OUTPATIENT
Start: 2025-01-24

## 2024-12-31 RX ORDER — DEXAMETHASONE 6 MG/1
12 TABLET ORAL ONCE
Status: CANCELLED | OUTPATIENT
Start: 2025-01-03

## 2025-01-02 ENCOUNTER — LAB (OUTPATIENT)
Dept: LAB | Facility: CLINIC | Age: 80
End: 2025-01-02
Payer: COMMERCIAL

## 2025-01-02 ENCOUNTER — OFFICE VISIT (OUTPATIENT)
Dept: HEMATOLOGY/ONCOLOGY | Facility: CLINIC | Age: 80
End: 2025-01-02
Payer: COMMERCIAL

## 2025-01-02 VITALS
BODY MASS INDEX: 26.21 KG/M2 | HEART RATE: 100 BPM | WEIGHT: 128.97 LBS | DIASTOLIC BLOOD PRESSURE: 83 MMHG | OXYGEN SATURATION: 98 % | SYSTOLIC BLOOD PRESSURE: 164 MMHG | RESPIRATION RATE: 17 BRPM | TEMPERATURE: 97.2 F

## 2025-01-02 DIAGNOSIS — C50.912 ADENOCARCINOMA OF LEFT BREAST (MULTI): ICD-10-CM

## 2025-01-02 LAB
ALBUMIN SERPL BCP-MCNC: 4.4 G/DL (ref 3.4–5)
ALP SERPL-CCNC: 78 U/L (ref 33–136)
ALT SERPL W P-5'-P-CCNC: 15 U/L (ref 7–45)
ANION GAP SERPL CALC-SCNC: 13 MMOL/L (ref 10–20)
AST SERPL W P-5'-P-CCNC: 19 U/L (ref 9–39)
BASOPHILS # BLD AUTO: 0.07 X10*3/UL (ref 0–0.1)
BASOPHILS NFR BLD AUTO: 1.4 %
BILIRUB SERPL-MCNC: 0.4 MG/DL (ref 0–1.2)
BUN SERPL-MCNC: 15 MG/DL (ref 6–23)
CALCIUM SERPL-MCNC: 9.9 MG/DL (ref 8.6–10.3)
CHLORIDE SERPL-SCNC: 101 MMOL/L (ref 98–107)
CO2 SERPL-SCNC: 30 MMOL/L (ref 21–32)
CREAT SERPL-MCNC: 0.71 MG/DL (ref 0.5–1.05)
EGFRCR SERPLBLD CKD-EPI 2021: 88 ML/MIN/1.73M*2
EOSINOPHIL # BLD AUTO: 0.01 X10*3/UL (ref 0–0.4)
EOSINOPHIL NFR BLD AUTO: 0.2 %
ERYTHROCYTE [DISTWIDTH] IN BLOOD BY AUTOMATED COUNT: 15.7 % (ref 11.5–14.5)
GLUCOSE SERPL-MCNC: 103 MG/DL (ref 74–99)
HCT VFR BLD AUTO: 36.8 % (ref 36–46)
HGB BLD-MCNC: 12 G/DL (ref 12–16)
IMM GRANULOCYTES # BLD AUTO: 0.01 X10*3/UL (ref 0–0.5)
IMM GRANULOCYTES NFR BLD AUTO: 0.2 % (ref 0–0.9)
LYMPHOCYTES # BLD AUTO: 1.18 X10*3/UL (ref 0.8–3)
LYMPHOCYTES NFR BLD AUTO: 23.7 %
MCH RBC QN AUTO: 29.1 PG (ref 26–34)
MCHC RBC AUTO-ENTMCNC: 32.6 G/DL (ref 32–36)
MCV RBC AUTO: 89 FL (ref 80–100)
MONOCYTES # BLD AUTO: 0.66 X10*3/UL (ref 0.05–0.8)
MONOCYTES NFR BLD AUTO: 13.3 %
NEUTROPHILS # BLD AUTO: 3.04 X10*3/UL (ref 1.6–5.5)
NEUTROPHILS NFR BLD AUTO: 61.2 %
NRBC BLD-RTO: 0 /100 WBCS (ref 0–0)
PLATELET # BLD AUTO: 266 X10*3/UL (ref 150–450)
POTASSIUM SERPL-SCNC: 4.1 MMOL/L (ref 3.5–5.3)
PROT SERPL-MCNC: 7.1 G/DL (ref 6.4–8.2)
RBC # BLD AUTO: 4.12 X10*6/UL (ref 4–5.2)
SODIUM SERPL-SCNC: 140 MMOL/L (ref 136–145)
WBC # BLD AUTO: 5 X10*3/UL (ref 4.4–11.3)

## 2025-01-02 PROCEDURE — 99215 OFFICE O/P EST HI 40 MIN: CPT

## 2025-01-02 PROCEDURE — 85025 COMPLETE CBC W/AUTO DIFF WBC: CPT

## 2025-01-02 PROCEDURE — 36415 COLL VENOUS BLD VENIPUNCTURE: CPT

## 2025-01-02 PROCEDURE — 99417 PROLNG OP E/M EACH 15 MIN: CPT

## 2025-01-02 PROCEDURE — 1126F AMNT PAIN NOTED NONE PRSNT: CPT

## 2025-01-02 PROCEDURE — 80053 COMPREHEN METABOLIC PANEL: CPT

## 2025-01-02 ASSESSMENT — PAIN SCALES - GENERAL: PAINLEVEL_OUTOF10: 0-NO PAIN

## 2025-01-02 NOTE — PROGRESS NOTES
Patient here for follow up visit [facility] Breast Patient of Dr Wing, scheduled for treatment 1/3/25 Docetaxel/ cyclophophamide 21 day cycles.     No concerns or complaints noted at this time.   Patient here with  Dimitris.    Medications and Allergies reviewed and reconciled this visit.    Follow up per MD request.    Patient reports availability and use of mychart, Reviewed this is a good place to communicate with the team as well as review labs and upcoming orders.      No barriers to education noted, patient agrees to current plan and verbalized understanding using teach back method.

## 2025-01-03 ENCOUNTER — APPOINTMENT (OUTPATIENT)
Dept: HEMATOLOGY/ONCOLOGY | Facility: CLINIC | Age: 80
End: 2025-01-03
Payer: COMMERCIAL

## 2025-01-03 ENCOUNTER — TELEPHONE (OUTPATIENT)
Dept: HEMATOLOGY/ONCOLOGY | Facility: CLINIC | Age: 80
End: 2025-01-03
Payer: COMMERCIAL

## 2025-01-03 DIAGNOSIS — C50.912 ADENOCARCINOMA OF LEFT BREAST (MULTI): Primary | ICD-10-CM

## 2025-01-03 DIAGNOSIS — C50.911 ADENOCARCINOMA OF RIGHT BREAST (MULTI): ICD-10-CM

## 2025-01-03 NOTE — TELEPHONE ENCOUNTER
Discussed with patient today regarding Dr. Daniel's agreement with continuation of chemotherapy. However she wishes to be done with chemotherapy and would like to be seen closer to home. I will cancel appt in [place] moving forward. Referral placed for Dr. Rhett Martinez in [place]. Will discuss with Dr. Martinez regarding radiation and anti-estrogen therapy. No further questions or concerns at this time.

## 2025-01-03 NOTE — TELEPHONE ENCOUNTER
Nguyễn is able to schedule the patient for treatment on 1/7.     I called the patient to confirm the date and time available and the patient stated she did not wish to schedule at this time as she is seeking a second opinion.     She is aware we can reschedule if she decides to move forward with planned treatment.

## 2025-01-06 ENCOUNTER — TELEPHONE (OUTPATIENT)
Dept: HEMATOLOGY/ONCOLOGY | Facility: CLINIC | Age: 80
End: 2025-01-06
Payer: COMMERCIAL

## 2025-01-06 DIAGNOSIS — C50.912 ADENOCARCINOMA OF LEFT BREAST (MULTI): Primary | ICD-10-CM

## 2025-01-06 RX ORDER — FAMOTIDINE 10 MG/ML
20 INJECTION INTRAVENOUS ONCE AS NEEDED
OUTPATIENT
Start: 2025-01-31

## 2025-01-06 RX ORDER — DIPHENHYDRAMINE HYDROCHLORIDE 50 MG/ML
50 INJECTION INTRAMUSCULAR; INTRAVENOUS AS NEEDED
OUTPATIENT
Start: 2025-01-31

## 2025-01-06 RX ORDER — PROCHLORPERAZINE EDISYLATE 5 MG/ML
10 INJECTION INTRAMUSCULAR; INTRAVENOUS EVERY 6 HOURS PRN
OUTPATIENT
Start: 2025-01-31

## 2025-01-06 RX ORDER — PROCHLORPERAZINE EDISYLATE 5 MG/ML
10 INJECTION INTRAMUSCULAR; INTRAVENOUS EVERY 6 HOURS PRN
OUTPATIENT
Start: 2025-01-10

## 2025-01-06 RX ORDER — DIPHENHYDRAMINE HCL 25 MG
25 CAPSULE ORAL ONCE
OUTPATIENT
Start: 2025-01-31

## 2025-01-06 RX ORDER — DIPHENHYDRAMINE HYDROCHLORIDE 50 MG/ML
50 INJECTION INTRAMUSCULAR; INTRAVENOUS AS NEEDED
OUTPATIENT
Start: 2025-01-10

## 2025-01-06 RX ORDER — DIPHENHYDRAMINE HCL 25 MG
25 CAPSULE ORAL ONCE
OUTPATIENT
Start: 2025-01-10

## 2025-01-06 RX ORDER — ALBUTEROL SULFATE 0.83 MG/ML
3 SOLUTION RESPIRATORY (INHALATION) AS NEEDED
OUTPATIENT
Start: 2025-01-10

## 2025-01-06 RX ORDER — PALONOSETRON 0.05 MG/ML
0.25 INJECTION, SOLUTION INTRAVENOUS ONCE
OUTPATIENT
Start: 2025-01-31

## 2025-01-06 RX ORDER — FAMOTIDINE 10 MG/ML
20 INJECTION INTRAVENOUS ONCE AS NEEDED
OUTPATIENT
Start: 2025-01-10

## 2025-01-06 RX ORDER — EPINEPHRINE 0.3 MG/.3ML
0.3 INJECTION SUBCUTANEOUS EVERY 5 MIN PRN
OUTPATIENT
Start: 2025-01-31

## 2025-01-06 RX ORDER — PROCHLORPERAZINE MALEATE 10 MG
10 TABLET ORAL EVERY 6 HOURS PRN
OUTPATIENT
Start: 2025-01-31

## 2025-01-06 RX ORDER — PROCHLORPERAZINE MALEATE 10 MG
10 TABLET ORAL EVERY 6 HOURS PRN
OUTPATIENT
Start: 2025-01-10

## 2025-01-06 RX ORDER — ALBUTEROL SULFATE 0.83 MG/ML
3 SOLUTION RESPIRATORY (INHALATION) AS NEEDED
OUTPATIENT
Start: 2025-01-31

## 2025-01-06 RX ORDER — EPINEPHRINE 0.3 MG/.3ML
0.3 INJECTION SUBCUTANEOUS EVERY 5 MIN PRN
OUTPATIENT
Start: 2025-01-10

## 2025-01-06 RX ORDER — PALONOSETRON 0.05 MG/ML
0.25 INJECTION, SOLUTION INTRAVENOUS ONCE
OUTPATIENT
Start: 2025-01-10

## 2025-01-06 NOTE — TELEPHONE ENCOUNTER
Pt called in to say that the [facility] provider is not accepting new patients at this time. She would like to complete to TC x 4 treatment as planned and will continue to stick with Dr. Smith. She would like to proceed with C3 this Friday or next week and follow up with Dr. Smith for her C4 in 3 weeks after her next treatment.     I have reached out to Dr. Smith and Alie Huang to assist with getting her rescheduled.   
Name band;

## 2025-01-07 ENCOUNTER — TELEPHONE (OUTPATIENT)
Dept: HEMATOLOGY/ONCOLOGY | Facility: CLINIC | Age: 80
End: 2025-01-07
Payer: COMMERCIAL

## 2025-01-08 NOTE — TELEPHONE ENCOUNTER
Spoke to Ayana and she stated all her questions were answered yesterday by Marjorie MEHTA Questions were concerning her schedule. Ayana stated no further needs.

## 2025-01-10 ENCOUNTER — OFFICE VISIT (OUTPATIENT)
Dept: HEMATOLOGY/ONCOLOGY | Facility: CLINIC | Age: 80
End: 2025-01-10
Payer: COMMERCIAL

## 2025-01-10 VITALS
TEMPERATURE: 98.1 F | OXYGEN SATURATION: 98 % | HEART RATE: 84 BPM | BODY MASS INDEX: 25.81 KG/M2 | RESPIRATION RATE: 17 BRPM | SYSTOLIC BLOOD PRESSURE: 184 MMHG | DIASTOLIC BLOOD PRESSURE: 81 MMHG | WEIGHT: 126.98 LBS

## 2025-01-10 DIAGNOSIS — C50.912 ADENOCARCINOMA OF LEFT BREAST (MULTI): ICD-10-CM

## 2025-01-10 PROCEDURE — 1126F AMNT PAIN NOTED NONE PRSNT: CPT | Performed by: INTERNAL MEDICINE

## 2025-01-10 PROCEDURE — 1159F MED LIST DOCD IN RCRD: CPT | Performed by: INTERNAL MEDICINE

## 2025-01-10 PROCEDURE — 99215 OFFICE O/P EST HI 40 MIN: CPT | Performed by: INTERNAL MEDICINE

## 2025-01-10 PROCEDURE — 1160F RVW MEDS BY RX/DR IN RCRD: CPT | Performed by: INTERNAL MEDICINE

## 2025-01-10 ASSESSMENT — PAIN SCALES - GENERAL: PAINLEVEL_OUTOF10: 0-NO PAIN

## 2025-01-10 NOTE — PROGRESS NOTES
Patient ID: Ayana Hernandez is a 79 y.o. female.  Referring Physician: Maciej Smith MD  [address]  [address] [place] [address]  Primary Care Provider: West Perez MD  Referral Reason: bilateral breast cancer    Subjective:  No complaints    Heme/Onc History:  Ms. Hernandez is a 79 y.o. woman who palpated a mass in the right breast approximately 1 year ago, which subsequently increased in size around July of 2024. Of note, previous mammogram on 9/28/2023 showed bilaterally dense breast tissue, without mammographic evidence of malignancy. Targeted ultrasound and diagnostic mammogram on 8/2/2024 showed a 1.5 x 3.2 cm mass in the right breast at the 12 o'clock position, 4 cm from the nipple. Incidentally found on mammogram in the left breast, was an area of new calcifications. Ultrasound of the area on the left showed an area of calcifications which measured 1.2 x 2.0 x 2.7 cm at the 1 o'clock position, 7 cm from the nipple. No abnormal-appearing lymph nodes were seen in either axilla. Ultrasound-guided biopsy of the masses in the right and left breasts was performed on 8/8/2024, with pathology from the right breast positive for invasive lobular carcinoma, grade 2, ER positive, NC and HER2/nav negative. In the left breast, pathology was positive for invasive ductal carcinoma, ER/NC positive, HER2/nav equivocal, negative on FISH.  Breast MRI was performed on 9/9/2024, and showed an irregular, enhancing, and spiculated mass in the central superior right breast, 3.1 x 1.7 x 2.9 cm in size. In the left breast, there was an irregular enhancing mass in the superior/lateral aspect, which measured 1.5 x 1.2 x 1.6 cm. There was no lymphadenopathy seen bilaterally. She then underwent a bilateral breast lumpectomy and sentinel node biopsy on 9/23/2024. In the right breast, there was a 3.6 cm invasive lobular carcinoma, grade 2, with negative margins (closest was 0.5 mm, anterior). There was no LVI. There was 1 sentinel  node removed, which was negative for metastatic carcinoma. In the left breast, there was a 2.4 cm invasive ductal carcinoma, grade 2, with negative margins (closest was 1.46 mm). There was no LVI. There were 2 sentinel nodes removed, which were all negative for metastatic carcinoma. DCIS component was removed as well, grade 2, with negative margins (closest was greater than 2 mm). Her Oncotype score was 28.      Past Medical History:   Past Medical History:   Diagnosis Date    Adverse effect of anesthesia     ALMOST FAINTED AFTER GETTING UP TO BATHROOM. LOW BP    Delayed emergence from general anesthesia     Encounter for screening mammogram for malignant neoplasm of breast     Visit for screening mammogram    Hypertension     Personal history of other diseases of the musculoskeletal system and connective tissue     History of osteopenia    PONV (postoperative nausea and vomiting)     Thyroid disease     low     Social History:   Social History     Socioeconomic History    Marital status:      Spouse name: Not on file    Number of children: Not on file    Years of education: Not on file    Highest education level: Not on file   Occupational History    Not on file   Tobacco Use    Smoking status: Former     Current packs/day: 0.00     Average packs/day: 1 pack/day for 29.0 years (29.0 ttl pk-yrs)     Types: Cigarettes     Start date: 1962     Quit date: 1991     Years since quittin.0     Passive exposure: Past    Smokeless tobacco: Never   Vaping Use    Vaping status: Never Used   Substance and Sexual Activity    Alcohol use: Not Currently     Comment: ONE MONTH AGO LAST DRINK    Drug use: Never    Sexual activity: Defer   Other Topics Concern    Not on file   Social History Narrative    Not on file     Social Drivers of Health     Financial Resource Strain: Low Risk  (2024)    Overall Financial Resource Strain (CARDIA)     Difficulty of Paying Living Expenses: Not hard at all   Food  Insecurity: No Food Insecurity (11/12/2024)    Hunger Vital Sign     Worried About Running Out of Food in the Last Year: Never true     Ran Out of Food in the Last Year: Never true   Transportation Needs: No Transportation Needs (11/12/2024)    PRAPARE - Transportation     Lack of Transportation (Medical): No     Lack of Transportation (Non-Medical): No   Physical Activity: Inactive (11/12/2024)    Exercise Vital Sign     Days of Exercise per Week: 0 days     Minutes of Exercise per Session: 0 min   Stress: No Stress Concern Present (11/12/2024)    [employer] [place] [employer] - Occupational Stress Questionnaire     Feeling of Stress : Not at all   Social Connections: Unknown (11/12/2024)    Social Connection and Isolation Panel [NHANES]     Frequency of Communication with Friends and Family: Three times a week     Frequency of Social Gatherings with Friends and Family: Three times a week     Attends Jain Services: Patient declined     Active Member of Clubs or Organizations: Patient declined     Attends Club or Organization Meetings: Patient declined     Marital Status:    Intimate Partner Violence: Not At Risk (11/12/2024)    Humiliation, Afraid, Rape, and Kick questionnaire     Fear of Current or Ex-Partner: No     Emotionally Abused: No     Physically Abused: No     Sexually Abused: No   Housing Stability: Low Risk  (11/12/2024)    Housing Stability Vital Sign     Unable to Pay for Housing in the Last Year: No     Number of Times Moved in the Last Year: 0     Homeless in the Last Year: No     Surgical History:   Past Surgical History:   Procedure Laterality Date    ABSCESS DRAINAGE Right     breast drained by dr. almeida    BREAST BIOPSY Bilateral 08/08/2024    BREAST LUMPECTOMY Right 2000    benign breast lump    BREAST LUMPECTOMY W/ NEEDLE LOCALIZATION Bilateral 09/23/2024    COLONOSCOPY  05/20/2013    Complete Colonoscopy    KNEE SURGERY  05/20/2013    Knee Surgery Left    KNEE SURGERY   05/20/2013    Knee Surgery Right    NOSE SURGERY      TONSILLECTOMY  05/20/2013    Tonsillectomy     Family History:   Family History   Problem Relation Name Age of Onset    Lung cancer Mother      Lung cancer Father        reports that she quit smoking about 34 years ago. Her smoking use included cigarettes. She started smoking about 63 years ago. She has a 29 pack-year smoking history. She has been exposed to tobacco smoke. She has never used smokeless tobacco.  Oncology Family history: Cancer-related family history includes Lung cancer in her father and mother.    Review Of Systems:  As stated per in HPI; otherwise all other 12 point ROS are negative    Physical Exam:  BP (!) 184/81 (BP Location: Left arm, Patient Position: Sitting, BP Cuff Size: Adult)   Pulse 84   Temp 36.7 °C (98.1 °F) (Temporal)   Resp 17   Wt 57.6 kg (126 lb 15.8 oz)   SpO2 98%   BMI 25.81 kg/m²   BSA: 1.55 meters squared  General: awake/alert/oriented x3, no distress, alert and cooperative  Head: Short hair fully covering scalp. Symmetric facial expressions  Eyes: PERRL, EOMI, clear sclera, eyebrows present.  Ears/Nose/Mouth/Throat:  Oral mucous membranes moist. No oral ulcers. No palpable pre/post-auricular lymph nodes  Neck: No palpable cervical chain lymph nodes  Respiratory: unlabored breathing on room air, good chest expansion, thorax symmetric  Cardio: Regular rate and rhythm, normal S1 and S2, radial pulses symmetric  GI: Nondistended, soft, non-tender abdomen  Musculoskeletal: Normal muscle bulk and tone, ROM intact, no joint swelling.  Rises from chair and walks unassisted.  Extremities: No ankle swelling, no arm or leg wounds  Neuro: Alert, cognition intact, speech normal. Facial expressions symmetric.  No motor deficits noted. Sensation intact to touch and hot/cold.   Able to stand from seated position unassisted and walks around the room unassisted.  Psychological: Appropriate mood and behavior.  Skin: Warm and dry, no  lesions, no rashes  Breast:  There is no appreciable breast masses, or axillary lymphadenopathy in the right or left axilla.     Results:  Diagnostic Results   Lab Results   Component Value Date    WBC 5.0 01/02/2025    HGB 12.0 01/02/2025    HCT 36.8 01/02/2025    MCV 89 01/02/2025     01/02/2025     Lab Results   Component Value Date    CALCIUM 9.9 01/02/2025     01/02/2025    K 4.1 01/02/2025    CO2 30 01/02/2025     01/02/2025    BUN 15 01/02/2025    CREATININE 0.71 01/02/2025    ALT 15 01/02/2025    AST 19 01/02/2025       Current Outpatient Medications:     acetaminophen (Tylenol) 500 mg tablet, Take 1 tablet (500 mg) by mouth every 6 hours if needed for mild pain (1 - 3). (Patient not taking: Reported on 1/2/2025), Disp: , Rfl:     alpha tocopherol (Vitamin E) 268 mg (400 unit) capsule, 1 capsule (400 Units) once every 24 hours., Disp: , Rfl:     amLODIPine (Norvasc) 5 mg tablet, Take 1 tablet (5 mg) by mouth once daily., Disp: , Rfl:     b complex 0.4 mg tablet, Take 1 tablet by mouth 1 (one) time per week in the early morning.., Disp: , Rfl:     levothyroxine (Synthroid, Levoxyl) 50 mcg tablet, Take 1 tablet (50 mcg) by mouth early in the morning.., Disp: , Rfl:     mv,Ca,min-iron-FA-lutein 18 mg iron-400 mcg-6 mg tablet, Take 1 tablet by mouth once daily., Disp: , Rfl:     nystatin (Mycostatin) 100,000 unit/gram powder, Apply 1 Application topically 2 times a day., Disp: 30 g, Rfl: 0    omega 3-dha-epa-fish oil (Fish OiL) 1,000 mg (120 mg-180 mg) capsule, Take 2 capsules (2,000 mg) by mouth 2 times a day., Disp: , Rfl:      Radiology:    Pathology:    Assessment/Plan:  ? Bilateral Breast Cancer:    Ayana Hernandez is a 79 y.o. female with IDC of left breast, Stage IA (pT2, pN0(sn), cM0, G2, ER+, ND+, HER2-) and ILC of right breast, Stage IIA (pT2, pN0(sn), cM0, G2, ER+, ND-, HER2-, Oncotype DX score: 28) s/p lumpectomies in 09/2024.    Oncotype from right breast is is 24.    Adjuvant  TC (%25 dose reduced per patient request) with Neulasta On pro support is planned followed by AI 5-10 years plus Ribociclib for 3 years (Tumor 2-5 cm, N0, Grade 2 with high genomic risk) is recommended.    Ambry negative.    C#1 TC given on 11/22/24. She got %75 of total dose per her request. Tolerated very well  C#2 TC given on 12/13/24. She got %80 of total dose but had more fatigue and constipation    C#3 TC planned on Monday. She wants to get %75 of the total dose. She is very scared about getting %80 of the total dose.    Will refer to [facility] Dr. Benavidez. MMG will be done 6 months after completion of radiation.    2- Bone health: Baseline DEXA scan in 10/24 is WNL. Adjuvant Zometa q 6 months for 3-5 years after completion of chemotx is recommended to improve DFS    Will start Zometa after completion of chemotx.    Telemed on C4D1    Diagnoses and all orders for this visit:  Adenocarcinoma of left breast (Multi)  -     Clinic Appointment Request       Performance Status: Asymptomatic    I spent more than 60 minutes for the patient today, including face-to-face conversation, pre-visit preparation, post-visit orders, and others.   Maciej Smith MD

## 2025-01-10 NOTE — PROGRESS NOTES
Pt seen in office today for a treatment follow up visit with Dr. Maciej Smith for management of her breast cancer.  She is without complaints today and denies pain. She is to receive her third and fourth cycle of TC at the [facility]. She is scheduled on 1/13/25 and 2/3/25.    Medications, pharmacy preference and allergies were reviewed with patient and updated in the medical record by MD.     Per orders, she is to have a telehealth visit in 3 weeks and will RTC in 6 weeks for a FUV with Dr. Smith. A referral to radiation/oncology has been placed and she would like for her to be seen by Dr. Benavidez on the same day, 2/21/25. Message has been sent to Omar Alvarado the admin assist in Rad/Onc to schedule.    Our contact information was given to patient and they were encouraged to contact us with any questions or concerns.     Patient verbalized understanding and agreement regarding discussed information via verbal feedback. Pt escorted to scheduling.

## 2025-01-13 ENCOUNTER — INFUSION (OUTPATIENT)
Dept: HEMATOLOGY/ONCOLOGY | Facility: CLINIC | Age: 80
End: 2025-01-13
Payer: COMMERCIAL

## 2025-01-13 VITALS
RESPIRATION RATE: 16 BRPM | SYSTOLIC BLOOD PRESSURE: 159 MMHG | TEMPERATURE: 97.5 F | DIASTOLIC BLOOD PRESSURE: 85 MMHG | HEART RATE: 91 BPM | BODY MASS INDEX: 25.68 KG/M2 | WEIGHT: 127.4 LBS | OXYGEN SATURATION: 99 % | HEIGHT: 59 IN

## 2025-01-13 DIAGNOSIS — C50.912 ADENOCARCINOMA OF LEFT BREAST (MULTI): ICD-10-CM

## 2025-01-13 PROCEDURE — 96417 CHEMO IV INFUS EACH ADDL SEQ: CPT

## 2025-01-13 PROCEDURE — 96377 APPLICATON ON-BODY INJECTOR: CPT | Mod: 59

## 2025-01-13 PROCEDURE — 2500000001 HC RX 250 WO HCPCS SELF ADMINISTERED DRUGS (ALT 637 FOR MEDICARE OP): Performed by: INTERNAL MEDICINE

## 2025-01-13 PROCEDURE — 2500000004 HC RX 250 GENERAL PHARMACY W/ HCPCS (ALT 636 FOR OP/ED): Mod: JZ,JG,TB | Performed by: INTERNAL MEDICINE

## 2025-01-13 PROCEDURE — 96413 CHEMO IV INFUSION 1 HR: CPT

## 2025-01-13 PROCEDURE — 96375 TX/PRO/DX INJ NEW DRUG ADDON: CPT | Mod: INF

## 2025-01-13 PROCEDURE — 2500000004 HC RX 250 GENERAL PHARMACY W/ HCPCS (ALT 636 FOR OP/ED): Performed by: INTERNAL MEDICINE

## 2025-01-13 PROCEDURE — 36415 COLL VENOUS BLD VENIPUNCTURE: CPT

## 2025-01-13 RX ORDER — PALONOSETRON 0.05 MG/ML
0.25 INJECTION, SOLUTION INTRAVENOUS ONCE
Status: COMPLETED | OUTPATIENT
Start: 2025-01-13 | End: 2025-01-13

## 2025-01-13 RX ORDER — DEXAMETHASONE 6 MG/1
12 TABLET ORAL ONCE
Status: COMPLETED | OUTPATIENT
Start: 2025-01-13 | End: 2025-01-13

## 2025-01-13 RX ORDER — DIPHENHYDRAMINE HCL 25 MG
25 CAPSULE ORAL ONCE
Status: COMPLETED | OUTPATIENT
Start: 2025-01-13 | End: 2025-01-13

## 2025-01-13 RX ORDER — PROCHLORPERAZINE EDISYLATE 5 MG/ML
10 INJECTION INTRAMUSCULAR; INTRAVENOUS EVERY 6 HOURS PRN
Status: DISCONTINUED | OUTPATIENT
Start: 2025-01-13 | End: 2025-01-13 | Stop reason: HOSPADM

## 2025-01-13 RX ORDER — PROCHLORPERAZINE MALEATE 10 MG
10 TABLET ORAL EVERY 6 HOURS PRN
Status: DISCONTINUED | OUTPATIENT
Start: 2025-01-13 | End: 2025-01-13 | Stop reason: HOSPADM

## 2025-01-13 RX ADMIN — PEGFILGRASTIM 6 MG: KIT SUBCUTANEOUS at 13:13

## 2025-01-13 RX ADMIN — DOCETAXEL 90 MG: 20 INJECTION, SOLUTION INTRAVENOUS at 11:31

## 2025-01-13 RX ADMIN — CYCLOPHOSPHAMIDE 702 MG: 1 INJECTION, POWDER, FOR SOLUTION INTRAVENOUS; ORAL at 12:36

## 2025-01-13 RX ADMIN — DEXAMETHASONE 12 MG: 6 TABLET ORAL at 10:38

## 2025-01-13 RX ADMIN — DIPHENHYDRAMINE HYDROCHLORIDE 25 MG: 25 CAPSULE ORAL at 10:38

## 2025-01-13 RX ADMIN — PALONOSETRON HYDROCHLORIDE 250 MCG: 0.25 INJECTION INTRAVENOUS at 10:38

## 2025-01-13 ASSESSMENT — PAIN SCALES - GENERAL: PAINLEVEL_OUTOF10: 0-NO PAIN

## 2025-01-13 NOTE — PROGRESS NOTES
Patient here for cycle 3 TC. Patient is tolerating well, complains of constipation, states miralax, colace are helping. Reviewed next appointments with patient. Education on Onpro given. AVS given to patient. Discharged in stable condition.

## 2025-01-13 NOTE — SIGNIFICANT EVENT

## 2025-01-24 ENCOUNTER — APPOINTMENT (OUTPATIENT)
Dept: HEMATOLOGY/ONCOLOGY | Facility: CLINIC | Age: 80
End: 2025-01-24
Payer: COMMERCIAL

## 2025-01-30 ENCOUNTER — LAB (OUTPATIENT)
Dept: LAB | Facility: HOSPITAL | Age: 80
End: 2025-01-30
Payer: COMMERCIAL

## 2025-01-30 DIAGNOSIS — C50.912 ADENOCARCINOMA OF LEFT BREAST (MULTI): ICD-10-CM

## 2025-01-30 LAB
ALBUMIN SERPL BCP-MCNC: 4.4 G/DL (ref 3.4–5)
ALP SERPL-CCNC: 75 U/L (ref 33–136)
ALT SERPL W P-5'-P-CCNC: 14 U/L (ref 7–45)
ANION GAP SERPL CALC-SCNC: 12 MMOL/L (ref 10–20)
AST SERPL W P-5'-P-CCNC: 17 U/L (ref 9–39)
BASOPHILS # BLD AUTO: 0.05 X10*3/UL (ref 0–0.1)
BASOPHILS NFR BLD AUTO: 1.1 %
BILIRUB SERPL-MCNC: 0.4 MG/DL (ref 0–1.2)
BUN SERPL-MCNC: 13 MG/DL (ref 6–23)
CALCIUM SERPL-MCNC: 9.8 MG/DL (ref 8.6–10.3)
CHLORIDE SERPL-SCNC: 103 MMOL/L (ref 98–107)
CO2 SERPL-SCNC: 28 MMOL/L (ref 21–32)
CREAT SERPL-MCNC: 0.71 MG/DL (ref 0.5–1.05)
EGFRCR SERPLBLD CKD-EPI 2021: 88 ML/MIN/1.73M*2
EOSINOPHIL # BLD AUTO: 0.01 X10*3/UL (ref 0–0.4)
EOSINOPHIL NFR BLD AUTO: 0.2 %
ERYTHROCYTE [DISTWIDTH] IN BLOOD BY AUTOMATED COUNT: 15.1 % (ref 11.5–14.5)
GLUCOSE SERPL-MCNC: 99 MG/DL (ref 74–99)
HCT VFR BLD AUTO: 37.7 % (ref 36–46)
HGB BLD-MCNC: 12.2 G/DL (ref 12–16)
IMM GRANULOCYTES # BLD AUTO: 0.01 X10*3/UL (ref 0–0.5)
IMM GRANULOCYTES NFR BLD AUTO: 0.2 % (ref 0–0.9)
LYMPHOCYTES # BLD AUTO: 1.1 X10*3/UL (ref 0.8–3)
LYMPHOCYTES NFR BLD AUTO: 23.5 %
MCH RBC QN AUTO: 28.9 PG (ref 26–34)
MCHC RBC AUTO-ENTMCNC: 32.4 G/DL (ref 32–36)
MCV RBC AUTO: 89 FL (ref 80–100)
MONOCYTES # BLD AUTO: 0.46 X10*3/UL (ref 0.05–0.8)
MONOCYTES NFR BLD AUTO: 9.8 %
NEUTROPHILS # BLD AUTO: 3.06 X10*3/UL (ref 1.6–5.5)
NEUTROPHILS NFR BLD AUTO: 65.2 %
PLATELET # BLD AUTO: 215 X10*3/UL (ref 150–450)
POTASSIUM SERPL-SCNC: 4.1 MMOL/L (ref 3.5–5.3)
PROT SERPL-MCNC: 7 G/DL (ref 6.4–8.2)
RBC # BLD AUTO: 4.22 X10*6/UL (ref 4–5.2)
SODIUM SERPL-SCNC: 139 MMOL/L (ref 136–145)
WBC # BLD AUTO: 4.7 X10*3/UL (ref 4.4–11.3)

## 2025-01-30 PROCEDURE — 36415 COLL VENOUS BLD VENIPUNCTURE: CPT

## 2025-01-30 PROCEDURE — 84075 ASSAY ALKALINE PHOSPHATASE: CPT

## 2025-01-30 PROCEDURE — 85025 COMPLETE CBC W/AUTO DIFF WBC: CPT

## 2025-01-31 ENCOUNTER — APPOINTMENT (OUTPATIENT)
Dept: HEMATOLOGY/ONCOLOGY | Facility: CLINIC | Age: 80
End: 2025-01-31
Payer: COMMERCIAL

## 2025-01-31 ENCOUNTER — TELEMEDICINE (OUTPATIENT)
Dept: HEMATOLOGY/ONCOLOGY | Facility: CLINIC | Age: 80
End: 2025-01-31
Payer: COMMERCIAL

## 2025-01-31 DIAGNOSIS — C50.912 ADENOCARCINOMA OF LEFT BREAST (MULTI): ICD-10-CM

## 2025-01-31 PROCEDURE — 99215 OFFICE O/P EST HI 40 MIN: CPT | Performed by: INTERNAL MEDICINE

## 2025-01-31 PROCEDURE — 1160F RVW MEDS BY RX/DR IN RCRD: CPT | Performed by: INTERNAL MEDICINE

## 2025-01-31 PROCEDURE — 1159F MED LIST DOCD IN RCRD: CPT | Performed by: INTERNAL MEDICINE

## 2025-01-31 PROCEDURE — 1126F AMNT PAIN NOTED NONE PRSNT: CPT | Performed by: INTERNAL MEDICINE

## 2025-01-31 ASSESSMENT — PAIN SCALES - GENERAL: PAINLEVEL_OUTOF10: 0-NO PAIN

## 2025-01-31 NOTE — PROGRESS NOTES
Patient ID: Ayana Hernandez is a 79 y.o. female.  Referring Physician: Maciej Smith MD  [address]  [address] [place] [address]  Primary Care Provider: West Perez MD  Referral Reason: bilateral breast cancer    Subjective:  No complaints    Heme/Onc History:  Ms. Hernandez is a 79 y.o. woman who palpated a mass in the right breast approximately 1 year ago, which subsequently increased in size around July of 2024. Of note, previous mammogram on 9/28/2023 showed bilaterally dense breast tissue, without mammographic evidence of malignancy. Targeted ultrasound and diagnostic mammogram on 8/2/2024 showed a 1.5 x 3.2 cm mass in the right breast at the 12 o'clock position, 4 cm from the nipple. Incidentally found on mammogram in the left breast, was an area of new calcifications. Ultrasound of the area on the left showed an area of calcifications which measured 1.2 x 2.0 x 2.7 cm at the 1 o'clock position, 7 cm from the nipple. No abnormal-appearing lymph nodes were seen in either axilla. Ultrasound-guided biopsy of the masses in the right and left breasts was performed on 8/8/2024, with pathology from the right breast positive for invasive lobular carcinoma, grade 2, ER positive, MA and HER2/nav negative. In the left breast, pathology was positive for invasive ductal carcinoma, ER/MA positive, HER2/nav equivocal, negative on FISH.  Breast MRI was performed on 9/9/2024, and showed an irregular, enhancing, and spiculated mass in the central superior right breast, 3.1 x 1.7 x 2.9 cm in size. In the left breast, there was an irregular enhancing mass in the superior/lateral aspect, which measured 1.5 x 1.2 x 1.6 cm. There was no lymphadenopathy seen bilaterally. She then underwent a bilateral breast lumpectomy and sentinel node biopsy on 9/23/2024. In the right breast, there was a 3.6 cm invasive lobular carcinoma, grade 2, with negative margins (closest was 0.5 mm, anterior). There was no LVI. There was 1 sentinel  node removed, which was negative for metastatic carcinoma. In the left breast, there was a 2.4 cm invasive ductal carcinoma, grade 2, with negative margins (closest was 1.46 mm). There was no LVI. There were 2 sentinel nodes removed, which were all negative for metastatic carcinoma. DCIS component was removed as well, grade 2, with negative margins (closest was greater than 2 mm). Her Oncotype score was 28.      Past Medical History:   Past Medical History:   Diagnosis Date    Adverse effect of anesthesia     ALMOST FAINTED AFTER GETTING UP TO BATHROOM. LOW BP    Delayed emergence from general anesthesia     Encounter for screening mammogram for malignant neoplasm of breast     Visit for screening mammogram    Hypertension     Personal history of other diseases of the musculoskeletal system and connective tissue     History of osteopenia    PONV (postoperative nausea and vomiting)     Thyroid disease     low     Social History:   Social History     Socioeconomic History    Marital status:      Spouse name: Not on file    Number of children: Not on file    Years of education: Not on file    Highest education level: Not on file   Occupational History    Not on file   Tobacco Use    Smoking status: Former     Current packs/day: 0.00     Average packs/day: 1 pack/day for 29.0 years (29.0 ttl pk-yrs)     Types: Cigarettes     Start date: 1962     Quit date: 1991     Years since quittin.1     Passive exposure: Past    Smokeless tobacco: Never   Vaping Use    Vaping status: Never Used   Substance and Sexual Activity    Alcohol use: Not Currently     Comment: ONE MONTH AGO LAST DRINK    Drug use: Never    Sexual activity: Defer   Other Topics Concern    Not on file   Social History Narrative    Not on file     Social Drivers of Health     Financial Resource Strain: Low Risk  (2024)    Overall Financial Resource Strain (CARDIA)     Difficulty of Paying Living Expenses: Not hard at all   Food  Insecurity: No Food Insecurity (11/12/2024)    Hunger Vital Sign     Worried About Running Out of Food in the Last Year: Never true     Ran Out of Food in the Last Year: Never true   Transportation Needs: No Transportation Needs (11/12/2024)    PRAPARE - Transportation     Lack of Transportation (Medical): No     Lack of Transportation (Non-Medical): No   Physical Activity: Inactive (11/12/2024)    Exercise Vital Sign     Days of Exercise per Week: 0 days     Minutes of Exercise per Session: 0 min   Stress: No Stress Concern Present (11/12/2024)    [employer] [place] [employer] - Occupational Stress Questionnaire     Feeling of Stress : Not at all   Social Connections: Unknown (11/12/2024)    Social Connection and Isolation Panel [NHANES]     Frequency of Communication with Friends and Family: Three times a week     Frequency of Social Gatherings with Friends and Family: Three times a week     Attends Mandaen Services: Patient declined     Active Member of Clubs or Organizations: Patient declined     Attends Club or Organization Meetings: Patient declined     Marital Status:    Intimate Partner Violence: Not At Risk (11/12/2024)    Humiliation, Afraid, Rape, and Kick questionnaire     Fear of Current or Ex-Partner: No     Emotionally Abused: No     Physically Abused: No     Sexually Abused: No   Housing Stability: Low Risk  (11/12/2024)    Housing Stability Vital Sign     Unable to Pay for Housing in the Last Year: No     Number of Times Moved in the Last Year: 0     Homeless in the Last Year: No     Surgical History:   Past Surgical History:   Procedure Laterality Date    ABSCESS DRAINAGE Right     breast drained by dr. almeida    BREAST BIOPSY Bilateral 08/08/2024    BREAST LUMPECTOMY Right 2000    benign breast lump    BREAST LUMPECTOMY W/ NEEDLE LOCALIZATION Bilateral 09/23/2024    COLONOSCOPY  05/20/2013    Complete Colonoscopy    KNEE SURGERY  05/20/2013    Knee Surgery Left    KNEE SURGERY   05/20/2013    Knee Surgery Right    NOSE SURGERY      TONSILLECTOMY  05/20/2013    Tonsillectomy     Family History:   Family History   Problem Relation Name Age of Onset    Lung cancer Mother      Lung cancer Father        reports that she quit smoking about 34 years ago. Her smoking use included cigarettes. She started smoking about 63 years ago. She has a 29 pack-year smoking history. She has been exposed to tobacco smoke. She has never used smokeless tobacco.  Oncology Family history: Cancer-related family history includes Lung cancer in her father and mother.    Review Of Systems:  As stated per in HPI; otherwise all other 12 point ROS are negative    Physical Exam:  There were no vitals taken for this visit.  BSA: There is no height or weight on file to calculate BSA.      Results:  Diagnostic Results   Lab Results   Component Value Date    WBC 4.7 01/30/2025    HGB 12.2 01/30/2025    HCT 37.7 01/30/2025    MCV 89 01/30/2025     01/30/2025     Lab Results   Component Value Date    CALCIUM 9.8 01/30/2025     01/30/2025    K 4.1 01/30/2025    CO2 28 01/30/2025     01/30/2025    BUN 13 01/30/2025    CREATININE 0.71 01/30/2025    ALT 14 01/30/2025    AST 17 01/30/2025       Current Outpatient Medications:     acetaminophen (Tylenol) 500 mg tablet, Take 1 tablet (500 mg) by mouth every 6 hours if needed for mild pain (1 - 3)., Disp: , Rfl:     alpha tocopherol (Vitamin E) 268 mg (400 unit) capsule, 1 capsule (400 Units) once every 24 hours., Disp: , Rfl:     amLODIPine (Norvasc) 5 mg tablet, Take 1 tablet (5 mg) by mouth once daily., Disp: , Rfl:     b complex 0.4 mg tablet, Take 1 tablet by mouth 1 (one) time per week in the early morning.., Disp: , Rfl:     levothyroxine (Synthroid, Levoxyl) 50 mcg tablet, Take 1 tablet (50 mcg) by mouth early in the morning.., Disp: , Rfl:     mv,Ca,min-iron-FA-lutein 18 mg iron-400 mcg-6 mg tablet, Take 1 tablet by mouth once daily., Disp: , Rfl:     nystatin  (Mycostatin) 100,000 unit/gram powder, Apply 1 Application topically 2 times a day., Disp: 30 g, Rfl: 0    omega 3-dha-epa-fish oil (Fish OiL) 1,000 mg (120 mg-180 mg) capsule, Take 2 capsules (2,000 mg) by mouth 2 times a day., Disp: , Rfl:      Radiology:    Pathology:    Assessment/Plan:  ? Bilateral Breast Cancer:    Ayana Hernandez is a 79 y.o. female with IDC of left breast, Stage IA (pT2, pN0(sn), cM0, G2, ER+, IL+, HER2-) and ILC of right breast, Stage IIA (pT2, pN0(sn), cM0, G2, ER+, IL-, HER2-, Oncotype DX score: 28) s/p lumpectomies in 09/2024.    Oncotype from right breast is is 24.    Adjuvant TC (%25 dose reduced per patient request) with Neulasta On pro support is planned followed by AI 5-10 years plus Ribociclib for 3 years (Tumor 2-5 cm, N0, Grade 2 with high genomic risk) is recommended.    Ambry negative.    C#1 TC given on 11/22/24. She got %75 of total dose per her request. Tolerated very well  C#2 TC given on 12/13/24. She got %80 of total dose but had more fatigue and constipation    C#3 TC given on 01/10/25. She wants to get %75 of the total dose. She is very scared about getting %80 of the total dose.    C#4 TC is given on 01/31/25. Labs from 01/30/25 is normal    She has RadOnc apt with Dr. Benavidez 03/4/25. MMG will be done 6 months after completion of radiation.    2- Bone health: Baseline DEXA scan in 10/24 is WNL. Adjuvant Zometa q 6 months for 3-5 years after completion of chemotx is recommended to improve DFS    Will discuss Zometa after completion of chemotx.    I will start AI on 03/4    Diagnoses and all orders for this visit:  Adenocarcinoma of left breast (Multi)  -     Clinic Appointment Request  -     Clinic Appointment Request Virtual Est       Performance Status: Asymptomatic    I spent more than 60 minutes for the patient today, including face-to-face conversation, pre-visit preparation, post-visit orders, and others.   Maciej Smith MD

## 2025-02-03 ENCOUNTER — INFUSION (OUTPATIENT)
Dept: HEMATOLOGY/ONCOLOGY | Facility: CLINIC | Age: 80
End: 2025-02-03
Payer: COMMERCIAL

## 2025-02-03 VITALS
HEART RATE: 91 BPM | TEMPERATURE: 97.2 F | DIASTOLIC BLOOD PRESSURE: 74 MMHG | HEIGHT: 59 IN | WEIGHT: 126.4 LBS | BODY MASS INDEX: 25.48 KG/M2 | RESPIRATION RATE: 16 BRPM | OXYGEN SATURATION: 98 % | SYSTOLIC BLOOD PRESSURE: 155 MMHG

## 2025-02-03 DIAGNOSIS — C50.912 ADENOCARCINOMA OF LEFT BREAST (MULTI): ICD-10-CM

## 2025-02-03 PROCEDURE — 2500000001 HC RX 250 WO HCPCS SELF ADMINISTERED DRUGS (ALT 637 FOR MEDICARE OP): Performed by: INTERNAL MEDICINE

## 2025-02-03 PROCEDURE — 96413 CHEMO IV INFUSION 1 HR: CPT

## 2025-02-03 PROCEDURE — 2500000004 HC RX 250 GENERAL PHARMACY W/ HCPCS (ALT 636 FOR OP/ED): Mod: JZ,TB | Performed by: INTERNAL MEDICINE

## 2025-02-03 PROCEDURE — 2500000004 HC RX 250 GENERAL PHARMACY W/ HCPCS (ALT 636 FOR OP/ED): Performed by: INTERNAL MEDICINE

## 2025-02-03 PROCEDURE — 96375 TX/PRO/DX INJ NEW DRUG ADDON: CPT | Mod: INF

## 2025-02-03 PROCEDURE — 96377 APPLICATON ON-BODY INJECTOR: CPT | Mod: 59

## 2025-02-03 PROCEDURE — 96417 CHEMO IV INFUS EACH ADDL SEQ: CPT

## 2025-02-03 RX ORDER — PALONOSETRON 0.05 MG/ML
0.25 INJECTION, SOLUTION INTRAVENOUS ONCE
Status: COMPLETED | OUTPATIENT
Start: 2025-02-03 | End: 2025-02-03

## 2025-02-03 RX ORDER — PROCHLORPERAZINE EDISYLATE 5 MG/ML
10 INJECTION INTRAMUSCULAR; INTRAVENOUS EVERY 6 HOURS PRN
Status: DISCONTINUED | OUTPATIENT
Start: 2025-02-03 | End: 2025-02-03 | Stop reason: HOSPADM

## 2025-02-03 RX ORDER — EPINEPHRINE 0.3 MG/.3ML
0.3 INJECTION SUBCUTANEOUS EVERY 5 MIN PRN
Status: DISCONTINUED | OUTPATIENT
Start: 2025-02-03 | End: 2025-02-03 | Stop reason: HOSPADM

## 2025-02-03 RX ORDER — ALBUTEROL SULFATE 0.83 MG/ML
3 SOLUTION RESPIRATORY (INHALATION) AS NEEDED
Status: DISCONTINUED | OUTPATIENT
Start: 2025-02-03 | End: 2025-02-03 | Stop reason: HOSPADM

## 2025-02-03 RX ORDER — FAMOTIDINE 10 MG/ML
20 INJECTION INTRAVENOUS ONCE AS NEEDED
Status: DISCONTINUED | OUTPATIENT
Start: 2025-02-03 | End: 2025-02-03 | Stop reason: HOSPADM

## 2025-02-03 RX ORDER — DIPHENHYDRAMINE HYDROCHLORIDE 50 MG/ML
50 INJECTION INTRAMUSCULAR; INTRAVENOUS AS NEEDED
Status: DISCONTINUED | OUTPATIENT
Start: 2025-02-03 | End: 2025-02-03 | Stop reason: HOSPADM

## 2025-02-03 RX ORDER — DIPHENHYDRAMINE HCL 25 MG
25 CAPSULE ORAL ONCE
Status: COMPLETED | OUTPATIENT
Start: 2025-02-03 | End: 2025-02-03

## 2025-02-03 RX ORDER — PROCHLORPERAZINE MALEATE 10 MG
10 TABLET ORAL EVERY 6 HOURS PRN
Status: DISCONTINUED | OUTPATIENT
Start: 2025-02-03 | End: 2025-02-03 | Stop reason: HOSPADM

## 2025-02-03 RX ORDER — DEXAMETHASONE 6 MG/1
12 TABLET ORAL ONCE
Status: COMPLETED | OUTPATIENT
Start: 2025-02-03 | End: 2025-02-03

## 2025-02-03 RX ADMIN — DIPHENHYDRAMINE HYDROCHLORIDE 25 MG: 25 CAPSULE ORAL at 13:21

## 2025-02-03 RX ADMIN — DOCETAXEL 90 MG: 20 INJECTION, SOLUTION, CONCENTRATE INTRAVENOUS at 14:10

## 2025-02-03 RX ADMIN — CYCLOPHOSPHAMIDE 702 MG: 1 INJECTION, POWDER, FOR SOLUTION INTRAVENOUS; ORAL at 15:32

## 2025-02-03 RX ADMIN — PALONOSETRON HYDROCHLORIDE 250 MCG: 0.25 INJECTION INTRAVENOUS at 13:22

## 2025-02-03 RX ADMIN — PEGFILGRASTIM 6 MG: KIT SUBCUTANEOUS at 16:12

## 2025-02-03 RX ADMIN — DEXAMETHASONE 12 MG: 6 TABLET ORAL at 13:21

## 2025-02-03 ASSESSMENT — PAIN SCALES - GENERAL: PAINLEVEL_OUTOF10: 0-NO PAIN

## 2025-02-03 NOTE — PROGRESS NOTES
Ayana is here for C4 TC infusion, tolerated well. Onpro applied, reviewed care and removal of device with pt and , both verbalized understanding. Discharged in stable condition, no further needs at this time. Has schedule for follow up. Education Documentation  When and How to Contact Clinic, taught by Arlene Miranda RN at 2/3/2025  4:27 PM.  Learner: Patient  Readiness: Acceptance  Method: Explanation  Response: Verbalizes Understanding    Oriented to Facility, taught by Arlene Miranda RN at 2/3/2025  4:27 PM.  Learner: Patient  Readiness: Acceptance  Method: Explanation  Response: Verbalizes Understanding    Shortness of Breath, taught by Arlene Miranda RN at 2/3/2025  4:27 PM.  Learner: Patient  Readiness: Acceptance  Method: Explanation  Response: Verbalizes Understanding    Changes in Appetite, taught by Arlene Miranda RN at 2/3/2025  4:27 PM.  Learner: Patient  Readiness: Acceptance  Method: Explanation  Response: Verbalizes Understanding    Fertility Issues, taught by Arlene Miranda RN at 2/3/2025  4:27 PM.  Learner: Patient  Readiness: Acceptance  Method: Explanation  Response: Verbalizes Understanding    Memory Problems, taught by Arlene Miranda RN at 2/3/2025  4:27 PM.  Learner: Patient  Readiness: Acceptance  Method: Explanation  Response: Verbalizes Understanding    Skin and Nail Changes, taught by Arlene Miranda RN at 2/3/2025  4:27 PM.  Learner: Patient  Readiness: Acceptance  Method: Explanation  Response: Verbalizes Understanding    Changes in Urination, taught by Arlene Miranda RN at 2/3/2025  4:27 PM.  Learner: Patient  Readiness: Acceptance  Method: Explanation  Response: Verbalizes Understanding    Bleeding Precautions, taught by Arlene Miranda RN at 2/3/2025  4:27 PM.  Learner: Patient  Readiness: Acceptance  Method: Explanation  Response: Verbalizes Understanding    Edema Management, taught by Arlene Miranda RN at 2/3/2025  4:27 PM.  Learner:  Patient  Readiness: Acceptance  Method: Explanation  Response: Verbalizes Understanding    Care of Neuropathy, taught by Arlene Miranda RN at 2/3/2025  4:27 PM.  Learner: Patient  Readiness: Acceptance  Method: Explanation  Response: Verbalizes Understanding    Infection Control, taught by Arlene Miranda RN at 2/3/2025  4:27 PM.  Learner: Patient  Readiness: Acceptance  Method: Explanation  Response: Verbalizes Understanding    Alopecia, taught by Arlene Miranda RN at 2/3/2025  4:27 PM.  Learner: Patient  Readiness: Acceptance  Method: Explanation  Response: Verbalizes Understanding    Diarrhea, taught by Arlene Miranda RN at 2/3/2025  4:27 PM.  Learner: Patient  Readiness: Acceptance  Method: Explanation  Response: Verbalizes Understanding    Constipation, taught by Arlene Miranda RN at 2/3/2025  4:27 PM.  Learner: Patient  Readiness: Acceptance  Method: Explanation  Response: Verbalizes Understanding    Mouth Sores, taught by Arleen Miranda RN at 2/3/2025  4:27 PM.  Learner: Patient  Readiness: Acceptance  Method: Explanation  Response: Verbalizes Understanding    Nausea Management, taught by Arlene Miranda RN at 2/3/2025  4:27 PM.  Learner: Patient  Readiness: Acceptance  Method: Explanation  Response: Verbalizes Understanding    Fatigue, taught by Arlene Miranda RN at 2/3/2025  4:27 PM.  Learner: Patient  Readiness: Acceptance  Method: Explanation  Response: Verbalizes Understanding    Chemotherapy Safety at Home, taught by Arlene Miranda RN at 2/3/2025  4:27 PM.  Learner: Patient  Readiness: Acceptance  Method: Explanation  Response: Verbalizes Understanding    Treatment Plan and Schedule, taught by Arlene Miranda RN at 2/3/2025  4:27 PM.  Learner: Patient  Readiness: Acceptance  Method: Explanation  Response: Verbalizes Understanding    General Medication Information, taught by Arlene Miranda RN at 2/3/2025  4:27 PM.  Learner: Patient  Readiness: Acceptance  Method:  Explanation  Response: Verbalizes Understanding    Supportive Medications, taught by Arlene Miranda, RN at 2/3/2025  4:27 PM.  Learner: Patient  Readiness: Acceptance  Method: Explanation  Response: Verbalizes Understanding    Education Comments  No comments found.

## 2025-02-28 ENCOUNTER — LAB (OUTPATIENT)
Dept: LAB | Facility: HOSPITAL | Age: 80
End: 2025-02-28
Payer: COMMERCIAL

## 2025-02-28 DIAGNOSIS — C50.912 ADENOCARCINOMA OF LEFT BREAST (MULTI): ICD-10-CM

## 2025-02-28 LAB
25(OH)D3 SERPL-MCNC: 47 NG/ML (ref 30–100)
ALBUMIN SERPL BCP-MCNC: 4.3 G/DL (ref 3.4–5)
ALP SERPL-CCNC: 75 U/L (ref 33–136)
ALT SERPL W P-5'-P-CCNC: 13 U/L (ref 7–45)
ANION GAP SERPL CALC-SCNC: 10 MMOL/L (ref 10–20)
AST SERPL W P-5'-P-CCNC: 21 U/L (ref 9–39)
BASOPHILS # BLD AUTO: 0.04 X10*3/UL (ref 0–0.1)
BASOPHILS NFR BLD AUTO: 1.1 %
BILIRUB SERPL-MCNC: 0.4 MG/DL (ref 0–1.2)
BUN SERPL-MCNC: 11 MG/DL (ref 6–23)
CALCIUM SERPL-MCNC: 9.6 MG/DL (ref 8.6–10.3)
CHLORIDE SERPL-SCNC: 106 MMOL/L (ref 98–107)
CO2 SERPL-SCNC: 28 MMOL/L (ref 21–32)
CREAT SERPL-MCNC: 0.67 MG/DL (ref 0.5–1.05)
EGFRCR SERPLBLD CKD-EPI 2021: 90 ML/MIN/1.73M*2
EOSINOPHIL # BLD AUTO: 0.03 X10*3/UL (ref 0–0.4)
EOSINOPHIL NFR BLD AUTO: 0.8 %
ERYTHROCYTE [DISTWIDTH] IN BLOOD BY AUTOMATED COUNT: 14.5 % (ref 11.5–14.5)
GLUCOSE SERPL-MCNC: 96 MG/DL (ref 74–99)
HCT VFR BLD AUTO: 37.2 % (ref 36–46)
HGB BLD-MCNC: 12 G/DL (ref 12–16)
IMM GRANULOCYTES # BLD AUTO: 0 X10*3/UL (ref 0–0.5)
IMM GRANULOCYTES NFR BLD AUTO: 0 % (ref 0–0.9)
IRON SATN MFR SERPL: 18 % (ref 25–45)
IRON SERPL-MCNC: 52 UG/DL (ref 35–150)
LYMPHOCYTES # BLD AUTO: 1.08 X10*3/UL (ref 0.8–3)
LYMPHOCYTES NFR BLD AUTO: 30.3 %
MCH RBC QN AUTO: 29.1 PG (ref 26–34)
MCHC RBC AUTO-ENTMCNC: 32.3 G/DL (ref 32–36)
MCV RBC AUTO: 90 FL (ref 80–100)
MONOCYTES # BLD AUTO: 0.36 X10*3/UL (ref 0.05–0.8)
MONOCYTES NFR BLD AUTO: 10.1 %
NEUTROPHILS # BLD AUTO: 2.06 X10*3/UL (ref 1.6–5.5)
NEUTROPHILS NFR BLD AUTO: 57.7 %
PLATELET # BLD AUTO: 218 X10*3/UL (ref 150–450)
POTASSIUM SERPL-SCNC: 3.7 MMOL/L (ref 3.5–5.3)
PROT SERPL-MCNC: 7 G/DL (ref 6.4–8.2)
RBC # BLD AUTO: 4.12 X10*6/UL (ref 4–5.2)
SODIUM SERPL-SCNC: 140 MMOL/L (ref 136–145)
TIBC SERPL-MCNC: 297 UG/DL (ref 240–445)
UIBC SERPL-MCNC: 245 UG/DL (ref 110–370)
VIT B12 SERPL-MCNC: >2000 PG/ML (ref 211–911)
WBC # BLD AUTO: 3.6 X10*3/UL (ref 4.4–11.3)

## 2025-02-28 PROCEDURE — 85025 COMPLETE CBC W/AUTO DIFF WBC: CPT

## 2025-02-28 PROCEDURE — 82306 VITAMIN D 25 HYDROXY: CPT | Mod: PORLAB

## 2025-02-28 PROCEDURE — 82607 VITAMIN B-12: CPT | Mod: PORLAB

## 2025-02-28 PROCEDURE — 83540 ASSAY OF IRON: CPT

## 2025-02-28 PROCEDURE — 36415 COLL VENOUS BLD VENIPUNCTURE: CPT

## 2025-02-28 PROCEDURE — 84075 ASSAY ALKALINE PHOSPHATASE: CPT

## 2025-03-03 ENCOUNTER — TELEPHONE (OUTPATIENT)
Dept: RADIATION ONCOLOGY | Facility: CLINIC | Age: 80
End: 2025-03-03
Payer: COMMERCIAL

## 2025-03-04 ENCOUNTER — HOSPITAL ENCOUNTER (OUTPATIENT)
Dept: RADIOLOGY | Facility: EXTERNAL LOCATION | Age: 80
Discharge: HOME | End: 2025-03-04

## 2025-03-04 ENCOUNTER — HOSPITAL ENCOUNTER (OUTPATIENT)
Dept: RADIATION ONCOLOGY | Facility: CLINIC | Age: 80
Setting detail: RADIATION/ONCOLOGY SERIES
Discharge: HOME | End: 2025-03-04
Payer: COMMERCIAL

## 2025-03-04 ENCOUNTER — OFFICE VISIT (OUTPATIENT)
Dept: HEMATOLOGY/ONCOLOGY | Facility: CLINIC | Age: 80
End: 2025-03-04
Payer: COMMERCIAL

## 2025-03-04 VITALS
BODY MASS INDEX: 25.56 KG/M2 | WEIGHT: 125.77 LBS | RESPIRATION RATE: 18 BRPM | TEMPERATURE: 96.8 F | DIASTOLIC BLOOD PRESSURE: 77 MMHG | SYSTOLIC BLOOD PRESSURE: 164 MMHG | HEART RATE: 92 BPM | OXYGEN SATURATION: 96 %

## 2025-03-04 VITALS
BODY MASS INDEX: 25.49 KG/M2 | OXYGEN SATURATION: 97 % | WEIGHT: 125.44 LBS | SYSTOLIC BLOOD PRESSURE: 156 MMHG | HEART RATE: 90 BPM | RESPIRATION RATE: 16 BRPM | TEMPERATURE: 96.8 F | DIASTOLIC BLOOD PRESSURE: 74 MMHG

## 2025-03-04 DIAGNOSIS — C50.412 INFILTRATING DUCTAL CARCINOMA OF UPPER-OUTER QUADRANT OF LEFT BREAST IN FEMALE: ICD-10-CM

## 2025-03-04 DIAGNOSIS — C50.912 ADENOCARCINOMA OF LEFT BREAST (MULTI): Primary | ICD-10-CM

## 2025-03-04 DIAGNOSIS — C50.912 ADENOCARCINOMA OF LEFT BREAST (MULTI): ICD-10-CM

## 2025-03-04 PROCEDURE — 77290 THER RAD SIMULAJ FIELD CPLX: CPT | Performed by: STUDENT IN AN ORGANIZED HEALTH CARE EDUCATION/TRAINING PROGRAM

## 2025-03-04 PROCEDURE — 99215 OFFICE O/P EST HI 40 MIN: CPT | Performed by: INTERNAL MEDICINE

## 2025-03-04 PROCEDURE — 99213 OFFICE O/P EST LOW 20 MIN: CPT | Performed by: STUDENT IN AN ORGANIZED HEALTH CARE EDUCATION/TRAINING PROGRAM

## 2025-03-04 PROCEDURE — 99213 OFFICE O/P EST LOW 20 MIN: CPT | Mod: 25 | Performed by: STUDENT IN AN ORGANIZED HEALTH CARE EDUCATION/TRAINING PROGRAM

## 2025-03-04 PROCEDURE — 1126F AMNT PAIN NOTED NONE PRSNT: CPT | Performed by: INTERNAL MEDICINE

## 2025-03-04 PROCEDURE — 1160F RVW MEDS BY RX/DR IN RCRD: CPT | Performed by: INTERNAL MEDICINE

## 2025-03-04 PROCEDURE — 77334 RADIATION TREATMENT AID(S): CPT | Performed by: STUDENT IN AN ORGANIZED HEALTH CARE EDUCATION/TRAINING PROGRAM

## 2025-03-04 PROCEDURE — 1159F MED LIST DOCD IN RCRD: CPT | Performed by: INTERNAL MEDICINE

## 2025-03-04 PROCEDURE — G2211 COMPLEX E/M VISIT ADD ON: HCPCS | Performed by: STUDENT IN AN ORGANIZED HEALTH CARE EDUCATION/TRAINING PROGRAM

## 2025-03-04 RX ORDER — LETROZOLE 2.5 MG/1
2.5 TABLET, FILM COATED ORAL DAILY
Qty: 60 TABLET | Refills: 5 | Status: SHIPPED | OUTPATIENT
Start: 2025-03-04 | End: 2026-03-04

## 2025-03-04 RX ORDER — ALBUTEROL SULFATE 0.83 MG/ML
3 SOLUTION RESPIRATORY (INHALATION) AS NEEDED
OUTPATIENT
Start: 2025-03-11

## 2025-03-04 RX ORDER — ZOLEDRONIC ACID 0.04 MG/ML
4 INJECTION, SOLUTION INTRAVENOUS ONCE
OUTPATIENT
Start: 2025-03-11

## 2025-03-04 RX ORDER — DIPHENHYDRAMINE HYDROCHLORIDE 50 MG/ML
50 INJECTION INTRAMUSCULAR; INTRAVENOUS AS NEEDED
OUTPATIENT
Start: 2025-03-11

## 2025-03-04 RX ORDER — FAMOTIDINE 10 MG/ML
20 INJECTION, SOLUTION INTRAVENOUS ONCE AS NEEDED
OUTPATIENT
Start: 2025-03-11

## 2025-03-04 RX ORDER — EPINEPHRINE 0.3 MG/.3ML
0.3 INJECTION SUBCUTANEOUS EVERY 5 MIN PRN
OUTPATIENT
Start: 2025-03-11

## 2025-03-04 ASSESSMENT — PAIN SCALES - GENERAL
PAINLEVEL_OUTOF10: 0-NO PAIN
PAINLEVEL_OUTOF10: 0-NO PAIN

## 2025-03-04 NOTE — PROGRESS NOTES
Pt seen in office today for a follow up visit with Dr. Maciej Smith for management of her breast cancer.  She is without complaints today and denies pain.     Medications, pharmacy preference and allergies were reviewed with patient and updated in the medical record by MD.     Per orders, labs were obtained on 2/28/25 and results are to be reviewed in office today by MD with patient. She  is scheduled for CT Sim for radiation today at 11:30. She is to start on letrozole and XGEVA.  A letter to her dentist asking for dental clearance has been given to patient prior to start of Xgeva. She will have all of her infusions at the [place] location as this is closer to her home in [place]. She will RTC in 3 months with labs prior to her visit.    Our contact information was given to patient and they were encouraged to contact us with any questions or concerns.     Patient verbalized understanding and agreement regarding discussed information via verbal feedback. Pt escorted to scheduling.

## 2025-03-04 NOTE — PROGRESS NOTES
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, Pathologic: Stage IA (pT2, pN0(sn), cM0, G2, ER+, AK+, HER2-, Oncotype DX score: 28)  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 Hernandez is a 80 y.o. man, who returns to radiation oncology clinic today for follow-up after completing adjuvant TC chemotherapy, as above. She overall tolerated her systemic therapy well, with no significant treatment related toxicity, other than changes to her nailbed over the recent weeks. She denies any significant neuropathy. She denies any nausea/vomiting or decreased appetite. He had a staging CT chest abdomen and pelvis on 2024, which was personally reviewed. There was a right axillary seroma, but otherwise no significant abnormalities. There was no evidence of distant metastatic disease. She denies any new breast masses or lesions.      Treatment  Rendered:   No radiation treatments to show. (Treatments may have been administered in another system.)       Review of Systems:   Review of Systems - Oncology    Performance Status:   The Karnofsky performance scale today is 90, Able to carry on normal activity; minor signs or symptoms of disease (ECOG equivalent 0).       OBJECTIVE  Vital Signs:  /77 (BP Location: Right arm, Patient Position: Sitting, BP Cuff Size: Adult)   Pulse 92   Temp 36 °C (96.8 °F) (Temporal)   Resp 18   Wt 57.1 kg (125 lb 12.4 oz)   SpO2 96%   BMI 25.56 kg/m²   Physical Exam  Vitals reviewed.   Constitutional:       General: She is not in acute distress.     Appearance: Normal appearance. She is not ill-appearing.   HENT:      Head: Normocephalic and atraumatic.      Mouth/Throat:      Mouth: Mucous membranes are moist.   Eyes:      Conjunctiva/sclera: Conjunctivae normal.   Cardiovascular:      Rate and Rhythm: Normal rate.   Pulmonary:      Effort: Pulmonary effort is normal.   Chest:      Comments: Breast exam deferred  Abdominal:      General: There is no distension.   Musculoskeletal:         General: Normal range of motion.   Skin:     General: Skin is warm and dry.   Neurological:      Mental Status: She is alert and oriented to person, place, and time.   Psychiatric:         Mood and Affect: Mood normal.         Behavior: Behavior normal.            ASSESSMENT:   Ms. Hernandez is a 80 y.o. 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    PLAN: We again had an extensive discussion regarding the role of radiation in this setting. We reviewed her final pathology, as well as her higher risk of disease given her high Oncotype, and T2 lobular carcinoma on the right. She is extremely hesitant to pursue radiation out of concern for toxicity, particularly cardiac effects on the left side, and we had an extensive discussion discussing the risks and benefits of radiation again with her. We reviewed her recent CT imaging today as well, and we will plan to treat her using a breath-hold technique (ABC) to further minimize incidental dose to the heart. Based on her anatomy, and the fact that we are treating the just the breasts without any targeted regional lymph nodes, the likelihood of significant dose to the heart is quite low. We will also review her radiation treatment plan with her prior to proceeding with treatment. After discussion, she was in agreement to proceed with a treatment planning CT today, and we will plan to have her initiate treatment in the coming weeks.    NCCN Guidelines were applicable to guide this patients treatment plan.  Maya Benavidez DO

## 2025-03-04 NOTE — LETTER
2025      To Whom It May Concern,       Patient, Ayana Hernandez,  1945, is currently under my care. I am planning to start XGEVA ASAP  which requires dental clearance.     Please call my office with any concerns at [phone]. My fax number is [phone].        Sincerely,        Dr. Maciej Smith MD

## 2025-03-04 NOTE — PROGRESS NOTES
Patient ID: Ayana Hernandez is a 80 y.o. female.  Referring Physician: Maciej Smith MD  [address]  [address] [place] [address]  Primary Care Provider: West Perez MD  Referral Reason: bilateral breast cancer    Subjective:  No complaints    Heme/Onc History:  Ms. Hernandez is a 79 y.o. woman who palpated a mass in the right breast approximately 1 year ago, which subsequently increased in size around July of 2024. Of note, previous mammogram on 9/28/2023 showed bilaterally dense breast tissue, without mammographic evidence of malignancy. Targeted ultrasound and diagnostic mammogram on 8/2/2024 showed a 1.5 x 3.2 cm mass in the right breast at the 12 o'clock position, 4 cm from the nipple. Incidentally found on mammogram in the left breast, was an area of new calcifications. Ultrasound of the area on the left showed an area of calcifications which measured 1.2 x 2.0 x 2.7 cm at the 1 o'clock position, 7 cm from the nipple. No abnormal-appearing lymph nodes were seen in either axilla. Ultrasound-guided biopsy of the masses in the right and left breasts was performed on 8/8/2024, with pathology from the right breast positive for invasive lobular carcinoma, grade 2, ER positive, RI and HER2/nav negative. In the left breast, pathology was positive for invasive ductal carcinoma, ER/RI positive, HER2/nav equivocal, negative on FISH.  Breast MRI was performed on 9/9/2024, and showed an irregular, enhancing, and spiculated mass in the central superior right breast, 3.1 x 1.7 x 2.9 cm in size. In the left breast, there was an irregular enhancing mass in the superior/lateral aspect, which measured 1.5 x 1.2 x 1.6 cm. There was no lymphadenopathy seen bilaterally. She then underwent a bilateral breast lumpectomy and sentinel node biopsy on 9/23/2024. In the right breast, there was a 3.6 cm invasive lobular carcinoma, grade 2, with negative margins (closest was 0.5 mm, anterior). There was no LVI. There was 1 sentinel  node removed, which was negative for metastatic carcinoma. In the left breast, there was a 2.4 cm invasive ductal carcinoma, grade 2, with negative margins (closest was 1.46 mm). There was no LVI. There were 2 sentinel nodes removed, which were all negative for metastatic carcinoma. DCIS component was removed as well, grade 2, with negative margins (closest was greater than 2 mm). Her Oncotype score was 28.      Past Medical History:   Past Medical History:   Diagnosis Date    Adverse effect of anesthesia     ALMOST FAINTED AFTER GETTING UP TO BATHROOM. LOW BP    Delayed emergence from general anesthesia     Encounter for screening mammogram for malignant neoplasm of breast     Visit for screening mammogram    Hypertension     Personal history of other diseases of the musculoskeletal system and connective tissue     History of osteopenia    PONV (postoperative nausea and vomiting)     Thyroid disease     low     Social History:   Social History     Socioeconomic History    Marital status:      Spouse name: Not on file    Number of children: Not on file    Years of education: Not on file    Highest education level: Not on file   Occupational History    Not on file   Tobacco Use    Smoking status: Former     Current packs/day: 0.00     Average packs/day: 1 pack/day for 29.0 years (29.0 ttl pk-yrs)     Types: Cigarettes     Start date: 1962     Quit date: 1991     Years since quittin.1     Passive exposure: Past    Smokeless tobacco: Never   Vaping Use    Vaping status: Never Used   Substance and Sexual Activity    Alcohol use: Not Currently     Comment: ONE MONTH AGO LAST DRINK    Drug use: Never    Sexual activity: Defer   Other Topics Concern    Not on file   Social History Narrative    Not on file     Social Drivers of Health     Financial Resource Strain: Low Risk  (2024)    Overall Financial Resource Strain (CARDIA)     Difficulty of Paying Living Expenses: Not hard at all   Food  Insecurity: No Food Insecurity (11/12/2024)    Hunger Vital Sign     Worried About Running Out of Food in the Last Year: Never true     Ran Out of Food in the Last Year: Never true   Transportation Needs: No Transportation Needs (11/12/2024)    PRAPARE - Transportation     Lack of Transportation (Medical): No     Lack of Transportation (Non-Medical): No   Physical Activity: Inactive (11/12/2024)    Exercise Vital Sign     Days of Exercise per Week: 0 days     Minutes of Exercise per Session: 0 min   Stress: No Stress Concern Present (11/12/2024)    [employer] - Occupational Stress Questionnaire     Feeling of Stress : Not at all   Social Connections: Unknown (11/12/2024)    Social Connection and Isolation Panel [NHANES]     Frequency of Communication with Friends and Family: Three times a week     Frequency of Social Gatherings with Friends and Family: Three times a week     Attends Gnosticism Services: Patient declined     Active Member of Clubs or Organizations: Patient declined     Attends Club or Organization Meetings: Patient declined     Marital Status:    Intimate Partner Violence: Not At Risk (11/12/2024)    Humiliation, Afraid, Rape, and Kick questionnaire     Fear of Current or Ex-Partner: No     Emotionally Abused: No     Physically Abused: No     Sexually Abused: No   Housing Stability: Low Risk  (11/12/2024)    Housing Stability Vital Sign     Unable to Pay for Housing in the Last Year: No     Number of Times Moved in the Last Year: 0     Homeless in the Last Year: No     Surgical History:   Past Surgical History:   Procedure Laterality Date    ABSCESS DRAINAGE Right     breast drained by dr. almeida    BREAST BIOPSY Bilateral 08/08/2024    BREAST LUMPECTOMY Right 2000    benign breast lump    BREAST LUMPECTOMY W/ NEEDLE LOCALIZATION Bilateral 09/23/2024    COLONOSCOPY  05/20/2013    Complete Colonoscopy    KNEE SURGERY  05/20/2013    Knee Surgery Left    KNEE SURGERY   05/20/2013    Knee Surgery Right    NOSE SURGERY      TONSILLECTOMY  05/20/2013    Tonsillectomy     Family History:   Family History   Problem Relation Name Age of Onset    Lung cancer Mother      Lung cancer Father        reports that she quit smoking about 34 years ago. Her smoking use included cigarettes. She started smoking about 63 years ago. She has a 29 pack-year smoking history. She has been exposed to tobacco smoke. She has never used smokeless tobacco.  Oncology Family history: Cancer-related family history includes Lung cancer in her father and mother.    Review Of Systems:  As stated per in HPI; otherwise all other 12 point ROS are negative    Physical Exam:  /74 (BP Location: Left arm, Patient Position: Sitting, BP Cuff Size: Adult long)   Pulse 90   Temp 36 °C (96.8 °F) (Temporal)   Resp 16   Wt 56.9 kg (125 lb 7.1 oz)   SpO2 97%   BMI 25.49 kg/m²   BSA: 1.54 meters squared      Results:  Diagnostic Results   Lab Results   Component Value Date    WBC 3.6 (L) 02/28/2025    HGB 12.0 02/28/2025    HCT 37.2 02/28/2025    MCV 90 02/28/2025     02/28/2025     Lab Results   Component Value Date    CALCIUM 9.6 02/28/2025     02/28/2025    K 3.7 02/28/2025    CO2 28 02/28/2025     02/28/2025    BUN 11 02/28/2025    CREATININE 0.67 02/28/2025    ALT 13 02/28/2025    AST 21 02/28/2025       Current Outpatient Medications:     acetaminophen (Tylenol) 500 mg tablet, Take 1 tablet (500 mg) by mouth every 6 hours if needed for mild pain (1 - 3)., Disp: , Rfl:     alpha tocopherol (Vitamin E) 268 mg (400 unit) capsule, 1 capsule (400 Units) once every 24 hours., Disp: , Rfl:     amLODIPine (Norvasc) 5 mg tablet, Take 1 tablet (5 mg) by mouth once daily., Disp: , Rfl:     b complex 0.4 mg tablet, Take 1 tablet by mouth 1 (one) time per week in the early morning.., Disp: , Rfl:     levothyroxine (Synthroid, Levoxyl) 50 mcg tablet, Take 1 tablet (50 mcg) by mouth early in the morning..,  Disp: , Rfl:     mv,Ca,min-iron-FA-lutein 18 mg iron-400 mcg-6 mg tablet, Take 1 tablet by mouth once daily., Disp: , Rfl:     nystatin (Mycostatin) 100,000 unit/gram powder, Apply 1 Application topically 2 times a day., Disp: 30 g, Rfl: 0    omega 3-dha-epa-fish oil (Fish OiL) 1,000 mg (120 mg-180 mg) capsule, Take 2 capsules (2,000 mg) by mouth 2 times a day., Disp: , Rfl:      Radiology:    Pathology:    Assessment/Plan:  ? Bilateral Breast Cancer:    Ayana Hernandez is a 79 y.o. female with IDC of left breast, Stage IA (pT2, pN0(sn), cM0, G2, ER+, [place]+, HER2-) and ILC of right breast, Stage IIA (pT2, pN0(sn), cM0, G2, ER+, [place]-, HER2-, Oncotype DX score: 28) s/p lumpectomies in 09/2024.    Oncotype from right breast is is 24.    Adjuvant TC (%25 dose reduced per patient request) with Neulasta On pro support is planned followed by AI 5-10 years plus Ribociclib for 3 years (Tumor 2-5 cm, N0, Grade 2 with high genomic risk) is recommended.    Ambry negative.    C#1 TC given on 11/22/24. She got %75 of total dose per her request. Tolerated very well  C#2 TC given on 12/13/24. She got %80 of total dose but had more fatigue and constipation  C#3 TC given on 01/10/25. She wants to get %75 of the total dose. She is very scared about getting %80 of the total dose.  C#4 TC is given on 01/31/25. Labs from 01/30/25 is normal    She has RadOnc apt with Dr. Benavidez today. MMG will be done 6 months after completion of radiation. I am starting her on AI (letrozole) to be used 5-10 years (preferably 10 years due to bilateral disease). I discussed Ribociclib in addition to AI but she declined it.    2- Bone health: Baseline DEXA scan in 10/24 is WNL. Adjuvant Zometa q 6 months for 3-5 years after completion of chemotx is recommended to improve DFS. BDental clearance letter is provided     RTC in 3 months for toxicity check    Diagnoses and all orders for this visit:  Adenocarcinoma of left breast (Multi)  -     Clinic Appointment  Request       Performance Status: Asymptomatic    I spent more than 60 minutes for the patient today, including face-to-face conversation, pre-visit preparation, post-visit orders, and others.   Maciej Smith MD

## 2025-03-04 NOTE — PROGRESS NOTES
Radiation Oncology Nursing Note    Pain: The patient's current pain level was assessed.  They report currently having a pain of 0 out of 10.  They feel their pain is under control without the use of pain medications.    Review of Systems:  Review of Systems - Oncology    Education Documentation  Treatment Plan and Schedule, taught by Delroy Holden RN at 3/4/2025 12:25 PM.  Learner: Significant Other, Patient  Readiness: Acceptance  Method: Explanation  Response: Verbalizes Understanding    Radiation Therapy (External Beam) : What Is Radiation Therapy, taught by Delroy Holden RN at 3/4/2025 12:25 PM.  Learner: Significant Other, Patient  Readiness: Acceptance  Method: Explanation  Response: Verbalizes Understanding    Radiation Therapy (External Beam) : Side Effects, taught by Delroy Holden RN at 3/4/2025 12:25 PM.  Learner: Significant Other, Patient  Readiness: Acceptance  Method: Explanation  Response: Verbalizes Understanding    Radiation Therapy (External Beam) : Review, taught by Delroy Holden RN at 3/4/2025 12:25 PM.  Learner: Significant Other, Patient  Readiness: Acceptance  Method: Explanation  Response: Verbalizes Understanding    Radiation Therapy (External Beam) : Life During Treatment, taught by Delroy Holden RN at 3/4/2025 12:25 PM.  Learner: Significant Other, Patient  Readiness: Acceptance  Method: Explanation  Response: Verbalizes Understanding    Radiation Therapy (External Beam) : Getting Radiation, taught by Delroy Holden RN at 3/4/2025 12:25 PM.  Learner: Significant Other, Patient  Readiness: Acceptance  Method: Explanation  Response: Verbalizes Understanding    Education Comments  No comments found.      Patient taken to changing area to prepare for simulation.

## 2025-03-11 ENCOUNTER — HOSPITAL ENCOUNTER (OUTPATIENT)
Dept: RADIATION ONCOLOGY | Facility: HOSPITAL | Age: 80
Setting detail: RADIATION/ONCOLOGY SERIES
Discharge: HOME | End: 2025-03-11
Payer: COMMERCIAL

## 2025-03-11 ENCOUNTER — HOSPITAL ENCOUNTER (OUTPATIENT)
Dept: RADIATION ONCOLOGY | Facility: CLINIC | Age: 80
Setting detail: RADIATION/ONCOLOGY SERIES
Discharge: HOME | End: 2025-03-11
Payer: COMMERCIAL

## 2025-03-11 PROCEDURE — 77300 RADIATION THERAPY DOSE PLAN: CPT | Performed by: STUDENT IN AN ORGANIZED HEALTH CARE EDUCATION/TRAINING PROGRAM

## 2025-03-11 PROCEDURE — 77293 RESPIRATOR MOTION MGMT SIMUL: CPT | Performed by: STUDENT IN AN ORGANIZED HEALTH CARE EDUCATION/TRAINING PROGRAM

## 2025-03-11 PROCEDURE — 77295 3-D RADIOTHERAPY PLAN: CPT | Performed by: STUDENT IN AN ORGANIZED HEALTH CARE EDUCATION/TRAINING PROGRAM

## 2025-03-11 PROCEDURE — 77334 RADIATION TREATMENT AID(S): CPT | Performed by: STUDENT IN AN ORGANIZED HEALTH CARE EDUCATION/TRAINING PROGRAM

## 2025-03-12 ENCOUNTER — APPOINTMENT (OUTPATIENT)
Dept: HEMATOLOGY/ONCOLOGY | Facility: CLINIC | Age: 80
End: 2025-03-12
Payer: COMMERCIAL

## 2025-03-13 ENCOUNTER — INFUSION (OUTPATIENT)
Dept: HEMATOLOGY/ONCOLOGY | Facility: CLINIC | Age: 80
End: 2025-03-13
Payer: COMMERCIAL

## 2025-03-13 VITALS
HEIGHT: 59 IN | BODY MASS INDEX: 24.48 KG/M2 | HEART RATE: 73 BPM | SYSTOLIC BLOOD PRESSURE: 151 MMHG | DIASTOLIC BLOOD PRESSURE: 68 MMHG | RESPIRATION RATE: 16 BRPM | WEIGHT: 121.4 LBS | TEMPERATURE: 97.3 F | OXYGEN SATURATION: 98 %

## 2025-03-13 DIAGNOSIS — C50.912 ADENOCARCINOMA OF LEFT BREAST (MULTI): ICD-10-CM

## 2025-03-13 PROCEDURE — 2500000004 HC RX 250 GENERAL PHARMACY W/ HCPCS (ALT 636 FOR OP/ED): Performed by: INTERNAL MEDICINE

## 2025-03-13 PROCEDURE — 36415 COLL VENOUS BLD VENIPUNCTURE: CPT

## 2025-03-13 PROCEDURE — 96365 THER/PROPH/DIAG IV INF INIT: CPT | Mod: INF

## 2025-03-13 RX ORDER — DIPHENHYDRAMINE HYDROCHLORIDE 50 MG/ML
50 INJECTION INTRAMUSCULAR; INTRAVENOUS AS NEEDED
OUTPATIENT
Start: 2025-08-28

## 2025-03-13 RX ORDER — EPINEPHRINE 0.3 MG/.3ML
0.3 INJECTION SUBCUTANEOUS EVERY 5 MIN PRN
Status: DISCONTINUED | OUTPATIENT
Start: 2025-03-13 | End: 2025-03-13 | Stop reason: HOSPADM

## 2025-03-13 RX ORDER — EPINEPHRINE 0.3 MG/.3ML
0.3 INJECTION SUBCUTANEOUS EVERY 5 MIN PRN
OUTPATIENT
Start: 2025-08-28

## 2025-03-13 RX ORDER — ALBUTEROL SULFATE 0.83 MG/ML
3 SOLUTION RESPIRATORY (INHALATION) AS NEEDED
OUTPATIENT
Start: 2025-08-28

## 2025-03-13 RX ORDER — FAMOTIDINE 10 MG/ML
20 INJECTION, SOLUTION INTRAVENOUS ONCE AS NEEDED
OUTPATIENT
Start: 2025-08-28

## 2025-03-13 RX ORDER — FAMOTIDINE 10 MG/ML
20 INJECTION, SOLUTION INTRAVENOUS ONCE AS NEEDED
Status: DISCONTINUED | OUTPATIENT
Start: 2025-03-13 | End: 2025-03-13 | Stop reason: HOSPADM

## 2025-03-13 RX ORDER — DIPHENHYDRAMINE HYDROCHLORIDE 50 MG/ML
50 INJECTION INTRAMUSCULAR; INTRAVENOUS AS NEEDED
Status: DISCONTINUED | OUTPATIENT
Start: 2025-03-13 | End: 2025-03-13 | Stop reason: HOSPADM

## 2025-03-13 RX ORDER — ALBUTEROL SULFATE 0.83 MG/ML
3 SOLUTION RESPIRATORY (INHALATION) AS NEEDED
Status: DISCONTINUED | OUTPATIENT
Start: 2025-03-13 | End: 2025-03-13 | Stop reason: HOSPADM

## 2025-03-13 RX ORDER — ZOLEDRONIC ACID 0.04 MG/ML
4 INJECTION, SOLUTION INTRAVENOUS ONCE
Status: CANCELLED | OUTPATIENT
Start: 2025-08-28

## 2025-03-13 RX ADMIN — ZOLEDRONIC ACID 3.5 MG: 4 INJECTION, SOLUTION, CONCENTRATE INTRAVENOUS at 15:48

## 2025-03-13 ASSESSMENT — PAIN SCALES - GENERAL: PAINLEVEL_OUTOF10: 0-NO PAIN

## 2025-03-13 NOTE — PROGRESS NOTES
Patient here for Zometa, tolerated well. Patient aware of future schedule. Patient denies any questions at this time. Patient discharged in stable condition.

## 2025-03-20 ENCOUNTER — TELEPHONE (OUTPATIENT)
Dept: HEMATOLOGY/ONCOLOGY | Facility: CLINIC | Age: 80
End: 2025-03-20
Payer: COMMERCIAL

## 2025-03-20 ENCOUNTER — TELEPHONE (OUTPATIENT)
Dept: RADIATION ONCOLOGY | Facility: CLINIC | Age: 80
End: 2025-03-20
Payer: COMMERCIAL

## 2025-03-20 NOTE — TELEPHONE ENCOUNTER
3/20/25 1245 Patient calling with c/o a bad back since Tuesday. Patient is scheduled for films on Friday and treatment start on Monday. She states that she cannot hold her breath, it is too painful. Will discuss with Dr. Benavidez and Ashley, RT and call patient back. Patient verbalizes understanding with verbal teach back. Pierre HODGES, RN, OCN    3/20/25 1253 Spoke with Ashley and Dr. Benavidez, and they would like to push everything back till Monday for the films and Tuesday for start date. If patient is no better on Monday, patient to call to cancel and patient instructed to call PCP for management of back pain. Patient verbalizes understanding with verbal teach back. Pierre HODGES, RN, OCN    3/21/25 1515 patient called to cancel her radiation treatments at this time. She woke up this am all swollen. She spoke with Med/onc and they instructed for patient to stop letrozole x1 month. Patient does not believe that she will be able to hold her breath at all. Patient states that she will call once the swelling is down and the pain in her back is better. Will notify team. Patient verbalizes understanding with verbal teach back. Pierre HODGES, RN, OCN

## 2025-03-20 NOTE — TELEPHONE ENCOUNTER
Spoke to Ayana.  On Tuesday pm she sneezed and stiffened her back and later developed severe back pain.  She had her first Zometa tx last Thursday.  Has been on Letrozole for 2 weeks also.  She also developed stiff swollen hand joints.  She has had serious back pain 2 years ago and does daily exercises for this and has not had issues until Tuesday.  She also has her first RT run through tomorrow.  She will call that office also.  I explained that Zometa can cause bone pain also.  She is able to walk and no other deficits.    I will send this message to Rhonda SALDANA for assistance.

## 2025-03-21 ENCOUNTER — DOCUMENTATION (OUTPATIENT)
Dept: HEMATOLOGY/ONCOLOGY | Facility: CLINIC | Age: 80
End: 2025-03-21
Payer: COMMERCIAL

## 2025-03-21 ENCOUNTER — APPOINTMENT (OUTPATIENT)
Dept: RADIATION ONCOLOGY | Facility: CLINIC | Age: 80
End: 2025-03-21
Payer: COMMERCIAL

## 2025-03-21 NOTE — PROGRESS NOTES
Spoke with patient regarding severe back pain, neck pain, hand, knee pain.  She has been on letrozole for 15 days and can't stand the pain.   At same time she was treated with Xgeva, calcium level was normal prior to treatment  Suggest she hold the letrozole for one month.   She is agreeable to trying another modality at that time  Suggest she take calcium 1200mg every day for next 3 days to replenish calcium we leached from her system.   She will call back in one month  Rhonda Mcmullen PA-C

## 2025-03-24 ENCOUNTER — APPOINTMENT (OUTPATIENT)
Dept: RADIATION ONCOLOGY | Facility: CLINIC | Age: 80
End: 2025-03-24
Payer: COMMERCIAL

## 2025-03-25 ENCOUNTER — APPOINTMENT (OUTPATIENT)
Dept: RADIATION ONCOLOGY | Facility: CLINIC | Age: 80
End: 2025-03-25
Payer: COMMERCIAL

## 2025-03-26 ENCOUNTER — APPOINTMENT (OUTPATIENT)
Dept: RADIATION ONCOLOGY | Facility: CLINIC | Age: 80
End: 2025-03-26
Payer: COMMERCIAL

## 2025-03-27 ENCOUNTER — APPOINTMENT (OUTPATIENT)
Dept: RADIATION ONCOLOGY | Facility: CLINIC | Age: 80
End: 2025-03-27
Payer: COMMERCIAL

## 2025-03-28 ENCOUNTER — APPOINTMENT (OUTPATIENT)
Dept: RADIATION ONCOLOGY | Facility: CLINIC | Age: 80
End: 2025-03-28
Payer: COMMERCIAL

## 2025-03-31 ENCOUNTER — APPOINTMENT (OUTPATIENT)
Dept: RADIATION ONCOLOGY | Facility: CLINIC | Age: 80
End: 2025-03-31
Payer: COMMERCIAL

## 2025-04-14 ENCOUNTER — APPOINTMENT (OUTPATIENT)
Dept: RADIATION ONCOLOGY | Facility: CLINIC | Age: 80
End: 2025-04-14
Payer: COMMERCIAL

## 2025-04-15 ENCOUNTER — HOSPITAL ENCOUNTER (OUTPATIENT)
Dept: RADIATION ONCOLOGY | Facility: CLINIC | Age: 80
Setting detail: RADIATION/ONCOLOGY SERIES
Discharge: HOME | End: 2025-04-15
Payer: COMMERCIAL

## 2025-04-15 ENCOUNTER — APPOINTMENT (OUTPATIENT)
Dept: RADIATION ONCOLOGY | Facility: CLINIC | Age: 80
End: 2025-04-15
Payer: COMMERCIAL

## 2025-04-15 PROCEDURE — 77280 THER RAD SIMULAJ FIELD SMPL: CPT | Performed by: STUDENT IN AN ORGANIZED HEALTH CARE EDUCATION/TRAINING PROGRAM

## 2025-04-16 ENCOUNTER — HOSPITAL ENCOUNTER (OUTPATIENT)
Dept: RADIATION ONCOLOGY | Facility: CLINIC | Age: 80
Setting detail: RADIATION/ONCOLOGY SERIES
Discharge: HOME | End: 2025-04-16
Payer: COMMERCIAL

## 2025-04-16 DIAGNOSIS — Z51.0 ENCOUNTER FOR ANTINEOPLASTIC RADIATION THERAPY: ICD-10-CM

## 2025-04-16 DIAGNOSIS — C50.412 MALIGNANT NEOPLASM OF UPPER-OUTER QUADRANT OF LEFT FEMALE BREAST: ICD-10-CM

## 2025-04-16 DIAGNOSIS — C50.811 MALIGNANT NEOPLASM OF OVERLAPPING SITES OF RIGHT FEMALE BREAST: ICD-10-CM

## 2025-04-16 LAB
RAD ONC MSQ ACTUAL FRACTIONS DELIVERED: 1
RAD ONC MSQ ACTUAL FRACTIONS DELIVERED: 1
RAD ONC MSQ ACTUAL SESSION DELIVERED DOSE: 520 CGRAY
RAD ONC MSQ ACTUAL SESSION DELIVERED DOSE: 520 CGRAY
RAD ONC MSQ ACTUAL TOTAL DOSE: 520 CGRAY
RAD ONC MSQ ACTUAL TOTAL DOSE: 520 CGRAY
RAD ONC MSQ ELAPSED DAYS: 0
RAD ONC MSQ ELAPSED DAYS: 0
RAD ONC MSQ LAST DATE: NORMAL
RAD ONC MSQ LAST DATE: NORMAL
RAD ONC MSQ PRESCRIBED FRACTIONAL DOSE: 520 CGRAY
RAD ONC MSQ PRESCRIBED FRACTIONAL DOSE: 520 CGRAY
RAD ONC MSQ PRESCRIBED NUMBER OF FRACTIONS: 5
RAD ONC MSQ PRESCRIBED NUMBER OF FRACTIONS: 5
RAD ONC MSQ PRESCRIBED TECHNIQUE: NORMAL
RAD ONC MSQ PRESCRIBED TECHNIQUE: NORMAL
RAD ONC MSQ PRESCRIBED TOTAL DOSE: 2600 CGRAY
RAD ONC MSQ PRESCRIBED TOTAL DOSE: 2600 CGRAY
RAD ONC MSQ PRESCRIPTION PATTERN COMMENT: NORMAL
RAD ONC MSQ PRESCRIPTION PATTERN COMMENT: NORMAL
RAD ONC MSQ START DATE: NORMAL
RAD ONC MSQ START DATE: NORMAL
RAD ONC MSQ TREATMENT COURSE NUMBER: 1
RAD ONC MSQ TREATMENT COURSE NUMBER: 1
RAD ONC MSQ TREATMENT SITE: NORMAL
RAD ONC MSQ TREATMENT SITE: NORMAL

## 2025-04-16 PROCEDURE — 77412 RADIATION TX DELIVERY LVL 3: CPT | Performed by: STUDENT IN AN ORGANIZED HEALTH CARE EDUCATION/TRAINING PROGRAM

## 2025-04-16 PROCEDURE — 77387 GUIDANCE FOR RADJ TX DLVR: CPT | Performed by: STUDENT IN AN ORGANIZED HEALTH CARE EDUCATION/TRAINING PROGRAM

## 2025-04-17 ENCOUNTER — HOSPITAL ENCOUNTER (OUTPATIENT)
Dept: RADIATION ONCOLOGY | Facility: CLINIC | Age: 80
Setting detail: RADIATION/ONCOLOGY SERIES
Discharge: HOME | End: 2025-04-17
Payer: COMMERCIAL

## 2025-04-17 VITALS
TEMPERATURE: 95.9 F | BODY MASS INDEX: 24.91 KG/M2 | SYSTOLIC BLOOD PRESSURE: 180 MMHG | WEIGHT: 122.58 LBS | DIASTOLIC BLOOD PRESSURE: 80 MMHG | RESPIRATION RATE: 18 BRPM | OXYGEN SATURATION: 94 % | HEART RATE: 90 BPM

## 2025-04-17 DIAGNOSIS — C50.811 MALIGNANT NEOPLASM OF OVERLAPPING SITES OF RIGHT FEMALE BREAST: ICD-10-CM

## 2025-04-17 DIAGNOSIS — C50.412 MALIGNANT NEOPLASM OF UPPER-OUTER QUADRANT OF LEFT FEMALE BREAST: ICD-10-CM

## 2025-04-17 DIAGNOSIS — Z51.0 ENCOUNTER FOR ANTINEOPLASTIC RADIATION THERAPY: ICD-10-CM

## 2025-04-17 DIAGNOSIS — N63.15 MASS OVERLAPPING MULTIPLE QUADRANTS OF RIGHT BREAST: ICD-10-CM

## 2025-04-17 LAB
RAD ONC MSQ ACTUAL FRACTIONS DELIVERED: 2
RAD ONC MSQ ACTUAL FRACTIONS DELIVERED: 2
RAD ONC MSQ ACTUAL SESSION DELIVERED DOSE: 520 CGRAY
RAD ONC MSQ ACTUAL SESSION DELIVERED DOSE: 520 CGRAY
RAD ONC MSQ ACTUAL TOTAL DOSE: 1040 CGRAY
RAD ONC MSQ ACTUAL TOTAL DOSE: 1040 CGRAY
RAD ONC MSQ ELAPSED DAYS: 1
RAD ONC MSQ ELAPSED DAYS: 1
RAD ONC MSQ LAST DATE: NORMAL
RAD ONC MSQ LAST DATE: NORMAL
RAD ONC MSQ PRESCRIBED FRACTIONAL DOSE: 520 CGRAY
RAD ONC MSQ PRESCRIBED FRACTIONAL DOSE: 520 CGRAY
RAD ONC MSQ PRESCRIBED NUMBER OF FRACTIONS: 5
RAD ONC MSQ PRESCRIBED NUMBER OF FRACTIONS: 5
RAD ONC MSQ PRESCRIBED TECHNIQUE: NORMAL
RAD ONC MSQ PRESCRIBED TECHNIQUE: NORMAL
RAD ONC MSQ PRESCRIBED TOTAL DOSE: 2600 CGRAY
RAD ONC MSQ PRESCRIBED TOTAL DOSE: 2600 CGRAY
RAD ONC MSQ PRESCRIPTION PATTERN COMMENT: NORMAL
RAD ONC MSQ PRESCRIPTION PATTERN COMMENT: NORMAL
RAD ONC MSQ START DATE: NORMAL
RAD ONC MSQ START DATE: NORMAL
RAD ONC MSQ TREATMENT COURSE NUMBER: 1
RAD ONC MSQ TREATMENT COURSE NUMBER: 1
RAD ONC MSQ TREATMENT SITE: NORMAL
RAD ONC MSQ TREATMENT SITE: NORMAL

## 2025-04-17 PROCEDURE — 77412 RADIATION TX DELIVERY LVL 3: CPT | Performed by: STUDENT IN AN ORGANIZED HEALTH CARE EDUCATION/TRAINING PROGRAM

## 2025-04-17 PROCEDURE — 77387 GUIDANCE FOR RADJ TX DLVR: CPT | Performed by: STUDENT IN AN ORGANIZED HEALTH CARE EDUCATION/TRAINING PROGRAM

## 2025-04-17 ASSESSMENT — ENCOUNTER SYMPTOMS
OCCASIONAL FEELINGS OF UNSTEADINESS: 0
LOSS OF SENSATION IN FEET: 0
DEPRESSION: 0

## 2025-04-17 ASSESSMENT — COLUMBIA-SUICIDE SEVERITY RATING SCALE - C-SSRS
6. HAVE YOU EVER DONE ANYTHING, STARTED TO DO ANYTHING, OR PREPARED TO DO ANYTHING TO END YOUR LIFE?: NO
2. HAVE YOU ACTUALLY HAD ANY THOUGHTS OF KILLING YOURSELF?: NO
1. IN THE PAST MONTH, HAVE YOU WISHED YOU WERE DEAD OR WISHED YOU COULD GO TO SLEEP AND NOT WAKE UP?: NO

## 2025-04-17 ASSESSMENT — PATIENT HEALTH QUESTIONNAIRE - PHQ9
SUM OF ALL RESPONSES TO PHQ9 QUESTIONS 1 AND 2: 0
1. LITTLE INTEREST OR PLEASURE IN DOING THINGS: NOT AT ALL
2. FEELING DOWN, DEPRESSED OR HOPELESS: NOT AT ALL

## 2025-04-17 ASSESSMENT — PAIN SCALES - GENERAL: PAINLEVEL_OUTOF10: 0-NO PAIN

## 2025-04-17 NOTE — PROGRESS NOTES
Radiation Oncology On Treatment Visit    Patient Name:  Ayana Hernandez  MRN:  [id number]  :  1945    Referring Provider: Dayan Hawthorne MD  Primary Care Provider: West Perez MD  Care Team: Patient Care Team:  West Perez MD as PCP - General (Family Medicine)  West Perez MD as Primary Care Provider  Lara Auguste MD as Obstetrician (Obstetrics and Gynecology)  Maciej Smith MD as Medical Oncologist (Hematology and Oncology)  Lara Huang, SLICK as Nurse Navigator (Hematology and Oncology)  Maya Benavidez DO as Radiation Oncologist (Radiation Oncology)  Pierre Waller RN as Registered Nurse (Radiation Oncology)    Date of Service: 2025     Diagnosis:   Specialty Problems          Radiation Oncology Problems    Infiltrating lobular carcinoma of right breast in female        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 No appreciable skin changes. Denies any pain/discomfort.       OBJECTIVE:   Vital Signs:  /80   Pulse 90   Temp 35.5 °C (95.9 °F) (Temporal)   Resp 18   Wt 55.6 kg (122 lb 9.2 oz)   SpO2 94%   BMI 24.91 kg/m²     Other Pertinent Findings:     Toxicity Assessment          2025    11:59   Toxicity Assessment   Treatment Site Breast   Anorexia Grade 0   Anxiety Grade 1   Dehydration Grade 0   Depression Grade 0   Dermatitis Radiation Grade 0       instructed to use Udderly smooth 2-3x/day   Diarrhea Grade 0   Fatigue Grade 1   Nausea Grade 0   Pain Grade 0   Dysphagia Grade 0   Breast Infection Grade 0   Seroma Grade 0    Joint Range of Motion Decreased Grade 0   Joint Range of Motion Decreased Lumbar Spine Grade 0   Nipple Deformity Grade 0   Edema Limbs Grade 0   Lymphedema Grade 0   Hot Flashes Grade 0        Assessment / Plan:  The patient is tolerating radiation therapy as anticipated.  Continue per current treatment plan.

## 2025-04-18 ENCOUNTER — HOSPITAL ENCOUNTER (OUTPATIENT)
Dept: RADIATION ONCOLOGY | Facility: CLINIC | Age: 80
Setting detail: RADIATION/ONCOLOGY SERIES
Discharge: HOME | End: 2025-04-18
Payer: COMMERCIAL

## 2025-04-18 DIAGNOSIS — C50.811 MALIGNANT NEOPLASM OF OVERLAPPING SITES OF RIGHT FEMALE BREAST: ICD-10-CM

## 2025-04-18 DIAGNOSIS — Z51.0 ENCOUNTER FOR ANTINEOPLASTIC RADIATION THERAPY: ICD-10-CM

## 2025-04-18 DIAGNOSIS — C50.412 MALIGNANT NEOPLASM OF UPPER-OUTER QUADRANT OF LEFT FEMALE BREAST: ICD-10-CM

## 2025-04-18 LAB
RAD ONC MSQ ACTUAL FRACTIONS DELIVERED: 3
RAD ONC MSQ ACTUAL FRACTIONS DELIVERED: 3
RAD ONC MSQ ACTUAL SESSION DELIVERED DOSE: 520 CGRAY
RAD ONC MSQ ACTUAL SESSION DELIVERED DOSE: 520 CGRAY
RAD ONC MSQ ACTUAL TOTAL DOSE: 1560 CGRAY
RAD ONC MSQ ACTUAL TOTAL DOSE: 1560 CGRAY
RAD ONC MSQ ELAPSED DAYS: 2
RAD ONC MSQ ELAPSED DAYS: 2
RAD ONC MSQ LAST DATE: NORMAL
RAD ONC MSQ LAST DATE: NORMAL
RAD ONC MSQ PRESCRIBED FRACTIONAL DOSE: 520 CGRAY
RAD ONC MSQ PRESCRIBED FRACTIONAL DOSE: 520 CGRAY
RAD ONC MSQ PRESCRIBED NUMBER OF FRACTIONS: 5
RAD ONC MSQ PRESCRIBED NUMBER OF FRACTIONS: 5
RAD ONC MSQ PRESCRIBED TECHNIQUE: NORMAL
RAD ONC MSQ PRESCRIBED TECHNIQUE: NORMAL
RAD ONC MSQ PRESCRIBED TOTAL DOSE: 2600 CGRAY
RAD ONC MSQ PRESCRIBED TOTAL DOSE: 2600 CGRAY
RAD ONC MSQ PRESCRIPTION PATTERN COMMENT: NORMAL
RAD ONC MSQ PRESCRIPTION PATTERN COMMENT: NORMAL
RAD ONC MSQ START DATE: NORMAL
RAD ONC MSQ START DATE: NORMAL
RAD ONC MSQ TREATMENT COURSE NUMBER: 1
RAD ONC MSQ TREATMENT COURSE NUMBER: 1
RAD ONC MSQ TREATMENT SITE: NORMAL
RAD ONC MSQ TREATMENT SITE: NORMAL

## 2025-04-18 PROCEDURE — 77387 GUIDANCE FOR RADJ TX DLVR: CPT | Performed by: STUDENT IN AN ORGANIZED HEALTH CARE EDUCATION/TRAINING PROGRAM

## 2025-04-18 PROCEDURE — 77412 RADIATION TX DELIVERY LVL 3: CPT | Performed by: STUDENT IN AN ORGANIZED HEALTH CARE EDUCATION/TRAINING PROGRAM

## 2025-04-21 ENCOUNTER — HOSPITAL ENCOUNTER (OUTPATIENT)
Dept: RADIATION ONCOLOGY | Facility: CLINIC | Age: 80
Setting detail: RADIATION/ONCOLOGY SERIES
Discharge: HOME | End: 2025-04-21
Payer: COMMERCIAL

## 2025-04-21 DIAGNOSIS — C50.811 MALIGNANT NEOPLASM OF OVERLAPPING SITES OF RIGHT FEMALE BREAST: ICD-10-CM

## 2025-04-21 DIAGNOSIS — Z51.0 ENCOUNTER FOR ANTINEOPLASTIC RADIATION THERAPY: ICD-10-CM

## 2025-04-21 DIAGNOSIS — C50.412 MALIGNANT NEOPLASM OF UPPER-OUTER QUADRANT OF LEFT FEMALE BREAST: ICD-10-CM

## 2025-04-21 LAB
RAD ONC MSQ ACTUAL FRACTIONS DELIVERED: 4
RAD ONC MSQ ACTUAL SESSION DELIVERED DOSE: 520 CGRAY
RAD ONC MSQ ACTUAL TOTAL DOSE: 2080 CGRAY
RAD ONC MSQ ELAPSED DAYS: 5
RAD ONC MSQ LAST DATE: NORMAL
RAD ONC MSQ PRESCRIBED FRACTIONAL DOSE: 520 CGRAY
RAD ONC MSQ PRESCRIBED NUMBER OF FRACTIONS: 5
RAD ONC MSQ PRESCRIBED TECHNIQUE: NORMAL
RAD ONC MSQ PRESCRIBED TOTAL DOSE: 2600 CGRAY
RAD ONC MSQ PRESCRIPTION PATTERN COMMENT: NORMAL
RAD ONC MSQ START DATE: NORMAL
RAD ONC MSQ TREATMENT COURSE NUMBER: 1
RAD ONC MSQ TREATMENT SITE: NORMAL

## 2025-04-21 PROCEDURE — 77387 GUIDANCE FOR RADJ TX DLVR: CPT | Performed by: STUDENT IN AN ORGANIZED HEALTH CARE EDUCATION/TRAINING PROGRAM

## 2025-04-21 PROCEDURE — 77412 RADIATION TX DELIVERY LVL 3: CPT | Performed by: STUDENT IN AN ORGANIZED HEALTH CARE EDUCATION/TRAINING PROGRAM

## 2025-04-22 ENCOUNTER — HOSPITAL ENCOUNTER (OUTPATIENT)
Dept: RADIATION ONCOLOGY | Facility: CLINIC | Age: 80
Setting detail: RADIATION/ONCOLOGY SERIES
Discharge: HOME | End: 2025-04-22
Payer: COMMERCIAL

## 2025-04-22 DIAGNOSIS — C50.412 MALIGNANT NEOPLASM OF UPPER-OUTER QUADRANT OF LEFT FEMALE BREAST: ICD-10-CM

## 2025-04-22 DIAGNOSIS — C50.811 MALIGNANT NEOPLASM OF OVERLAPPING SITES OF RIGHT FEMALE BREAST: ICD-10-CM

## 2025-04-22 DIAGNOSIS — Z51.0 ENCOUNTER FOR ANTINEOPLASTIC RADIATION THERAPY: ICD-10-CM

## 2025-04-22 LAB
RAD ONC MSQ ACTUAL FRACTIONS DELIVERED: 5
RAD ONC MSQ ACTUAL FRACTIONS DELIVERED: 5
RAD ONC MSQ ACTUAL SESSION DELIVERED DOSE: 520 CGRAY
RAD ONC MSQ ACTUAL SESSION DELIVERED DOSE: 520 CGRAY
RAD ONC MSQ ACTUAL TOTAL DOSE: 2600 CGRAY
RAD ONC MSQ ACTUAL TOTAL DOSE: 2600 CGRAY
RAD ONC MSQ ELAPSED DAYS: 6
RAD ONC MSQ ELAPSED DAYS: 6
RAD ONC MSQ LAST DATE: NORMAL
RAD ONC MSQ LAST DATE: NORMAL
RAD ONC MSQ PRESCRIBED FRACTIONAL DOSE: 520 CGRAY
RAD ONC MSQ PRESCRIBED FRACTIONAL DOSE: 520 CGRAY
RAD ONC MSQ PRESCRIBED NUMBER OF FRACTIONS: 5
RAD ONC MSQ PRESCRIBED NUMBER OF FRACTIONS: 5
RAD ONC MSQ PRESCRIBED TECHNIQUE: NORMAL
RAD ONC MSQ PRESCRIBED TECHNIQUE: NORMAL
RAD ONC MSQ PRESCRIBED TOTAL DOSE: 2600 CGRAY
RAD ONC MSQ PRESCRIBED TOTAL DOSE: 2600 CGRAY
RAD ONC MSQ PRESCRIPTION PATTERN COMMENT: NORMAL
RAD ONC MSQ PRESCRIPTION PATTERN COMMENT: NORMAL
RAD ONC MSQ START DATE: NORMAL
RAD ONC MSQ START DATE: NORMAL
RAD ONC MSQ TREATMENT COURSE NUMBER: 1
RAD ONC MSQ TREATMENT COURSE NUMBER: 1
RAD ONC MSQ TREATMENT SITE: NORMAL
RAD ONC MSQ TREATMENT SITE: NORMAL

## 2025-04-22 PROCEDURE — 77336 RADIATION PHYSICS CONSULT: CPT | Performed by: STUDENT IN AN ORGANIZED HEALTH CARE EDUCATION/TRAINING PROGRAM

## 2025-04-22 PROCEDURE — 77387 GUIDANCE FOR RADJ TX DLVR: CPT | Performed by: STUDENT IN AN ORGANIZED HEALTH CARE EDUCATION/TRAINING PROGRAM

## 2025-04-22 PROCEDURE — 77412 RADIATION TX DELIVERY LVL 3: CPT | Performed by: STUDENT IN AN ORGANIZED HEALTH CARE EDUCATION/TRAINING PROGRAM

## 2025-04-22 NOTE — PROGRESS NOTES
Radiation Oncology Treatment Summary    Patient Name:  Ayana Hernandez  MRN:  [id number]  :  1945    Referring Provider: Dayan Hawthorne MD  Primary Care Provider: West Perez MD    Brief History: Ayana Hernandez is a 80 y.o. 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  The patient completed radiotherapy as outlined below.    Radiation Treatment Summary:    Other Treatments    Treatment Period Technique Fraction Dose Fractions Total Dose   Course 1 2025-2025  (days elapsed: 6)         L breast 2025-2025 3D 520 / 520 cGy 5 / 5 2,600 / 2,600 cGy         R breast 2025-2025 3D 520 / 520 cGy 5 / 5 2,600 / 2,600 cGy       Please see the patient's [facility] chart for further details regarding the radiation plan, including beam energy.    Concurrent Chemotherapy:  Treatment Plans       No treatment plans exist          CTCAE Toxicity Overview:   Toxicity Assessment          2025    11:59   Toxicity Assessment   Treatment Site Breast   Anorexia Grade 0   Anxiety Grade 1   Dehydration Grade 0   Depression Grade 0   Dermatitis Radiation Grade 0       instructed to use Udderly smooth 2-3x/day   Diarrhea Grade 0   Fatigue Grade 1   Nausea Grade 0   Pain Grade 0   Dysphagia Grade 0   Breast Infection Grade 0   Seroma Grade 0   Joint Range of Motion Decreased Grade 0   Joint Range of Motion Decreased Lumbar Spine Grade 0   Nipple Deformity Grade 0   Edema Limbs  Grade 0   Lymphedema Grade 0   Hot Flashes Grade 0     Patient Disposition: Patient to follow up on 6/16/25.

## 2025-04-30 NOTE — PROGRESS NOTES
Subjective   Patient ID: Ayana Hernandez is a 80 y.o. female who presents for 6 month follow up-breast exam, no imaging.  HPI  Patient returns to clinic and presents for 6 month follow up-breast exam, no imaging.  Patient was last seen in clinic on 11/5/2024 for 3 week follow up.  Patient seen Dr. Maya Bunn Onc in the office on 3/4/2025 for follow up exam. After discussion, she was in agreement to proceed with a treatment planning CT today, and we will plan to have her initiate treatment in the coming weeks. Last round radiation was on 04/22/2025. Chemo ended on 02/25/2025  Patient seen Dr. Maciej Barton Onc in the office on 3/4/2025 for follow up exam. She has RadOnc apt with Dr. Benavidez today. MMG will be done 6 months after completion of radiation. I am starting her on AI (letrozole) to be used 5-10 years (preferably 10 years due to bilateral disease). I discussed Ribociclib in addition to AI but she declined it.     Treatment Summary:      Treatment Period Technique Fraction Dose Fractions Total Dose    Course 1 4/16/2025-4/17/2025  (days elapsed: 1)         L breast 4/16/2025-4/17/2025 3D 520 / 520 cGy 2 / 5 1,040 / 2,600 cGy         R breast 4/16/2025-4/17/2025 3D 520 / 520 cGy 2 / 5 1,040 / 2,600 cGy      The patient is tolerating radiation therapy as anticipated. Continue per current treatment plan.     Social History:  Tobacco Use - former  Do you use any recreational drugs -no   Alcohol Use - rarely  Caffeine Intake - coffee 2daily  Working - retired  Marital status -   Living with -    Past Medical History:   Diagnosis Date    Adverse effect of anesthesia     ALMOST FAINTED AFTER GETTING UP TO BATHROOM. LOW BP    Delayed emergence from general anesthesia     Encounter for screening mammogram for malignant neoplasm of breast     Visit for screening mammogram    Hypertension     Personal history of other diseases of the musculoskeletal system and connective tissue     History of  "osteopenia    PONV (postoperative nausea and vomiting)     Thyroid disease     low     Family History   Problem Relation Name Age of Onset    Lung cancer Mother      Lung cancer Father       Past Surgical History:   Procedure Laterality Date    ABSCESS DRAINAGE Right     breast drained by dr. almeida    BREAST BIOPSY Bilateral 08/08/2024    BREAST LUMPECTOMY Right 2000    benign breast lump    BREAST LUMPECTOMY W/ NEEDLE LOCALIZATION Bilateral 09/23/2024    COLONOSCOPY  05/20/2013    Complete Colonoscopy    KNEE SURGERY  05/20/2013    Knee Surgery Left    KNEE SURGERY  05/20/2013    Knee Surgery Right    NOSE SURGERY      TONSILLECTOMY  05/20/2013    Tonsillectomy     Allergies   Allergen Reactions    Egg Other     STOMACH CRAMPS     Fentanyl Other     Drops blood pressure    Morphine Nausea/vomiting    Penicillins Anaphylaxis    Erythromycin Nausea/vomiting    Lisinopril Other     LOST EYE SIGHT IN LEFT EYE FOR 30 DAYS    STATES WITH HCTZ    Tetracyclines GI Upset       Review of Systems  /83 (BP Location: Left arm, Patient Position: Sitting, BP Cuff Size: Adult)   Pulse 84   Temp 36.7 °C (98 °F) (Temporal)   Resp 17   Ht 1.494 m (4' 10.82\")   Wt 55.3 kg (122 lb)   SpO2 98%   BMI 24.79 kg/m²    Objective   Physical Exam  Physical Exam:  /83 (BP Location: Left arm, Patient Position: Sitting, BP Cuff Size: Adult)   Pulse 84   Temp 36.7 °C (98 °F) (Temporal)   Resp 17   Ht 1.494 m (4' 10.82\")   Wt 55.3 kg (122 lb)   SpO2 98%   BMI 24.79 kg/m²    GENERAL APPEARANCE: Patient appears in no acute distress.   EYES: Sclera non-icteric, PERRLA.   ENT Normal appearance of ears and nose.   NECK/THYROID: Neck: no masses. Thyroid: no masses.   LYMPH NODES: No cervical or supraclavicular lymphadenopathy.   CARDIOVASCULAR Heart: RRR, no murmurs; Carotid bruits: none; Peripheral edema: none.   RESPIRATORY: Lungs: Bilateral inspiratory and expiratory wheezing; no respiratory distress.   BREASTS: Exam done " both in upright and supine position. On inspection no skin dimpling, no peau d'orange, with bilateral erythema secondary to recent radiation therapy that just completed last month.  Patient with lumpectomy scars bilateral breast healed nicely.; Masses: none palpable in either breast.  Axillary lymphadenopathy: none.  With palpable lump in the right axilla consistent with a known seroma.  Nontender.  GI (ABDOMEN) No intraabdominal mass appreciated, no hepatosplenomegaly; Hernia: none; Tenderness: none.   PSYCH: Patient oriented to time, place and person, normal affect.    Assessment/Plan   Problem List Items Addressed This Visit           ICD-10-CM    Infiltrating lobular carcinoma of right breast in female - Primary C50.911    Patient slowly healing from radiation therapy.  Breast exam normal without any masses.  Patient to have bilateral mammogram after she is 6 months out from radiation which will put us in October.  Patient had repeat exam by me at that time.  Patient indicates she did not tolerate her letrozole.  Joint pain.  Recommend she follow-up with oncology for discussion of potentially different medication.         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  Breast exam normal without any masses.  Patient to have bilateral mammogram after she is 6 months out from radiation which will put us in October.  Patient had repeat exam by me at that time.  Patient indicates she did not tolerate her letrozole.  Joint pain.  Recommend she follow-up with oncology for discussion of potentially different medication.         Relevant Orders    BI mammo bilateral diagnostic    Seroma of breast N64.89    Patient with a seroma associated with the right axillary incision.  Offered aspiration today.  Patient [place] it does not cause discomfort.  Will treat conservatively and  reassess with her next visit in 6 months             Breast History:  Patient is new to clinic and presents for Bilateral US guided breast biopsy 8/8/2024.  Patient indicates she had a palpable lump in her right breast for years.  Recently however she thinks it is increased in size.  She noticed something in the mirror when she looked at her breast and subsequently called.  Patient did not feel any lumps in her left breast until after the imaging.  Patient has a history of a right breast biopsy which sounds like an excisional biopsy in the year 2000 for benign disease.  Patient has a history of left breast abscess in 1993 that was taken care of as well.  Patient denies any nipple discharge.  Patient had a maternal great grandmother had ovarian cancer in her 80s.  Patient with palpable lesion right breast 12 o'clock position initially appeared to be a 1.2 cm but on further scanning may actually be an area of 3.2 cm.  Recommend ultrasound-guided biopsy.Patient to be scheduled for ultrasound guided  biopsy of the breast in the radiology department. Risk, benefits and alternatives to this plan were thoroughly discussed with the patient who agrees to proceed.  The procedure was also thoroughly discussed as well as her follow-up. She is to see me in the office in one week but I also will call as soon as I see the pathology which is usually 4 working days from procedure.   An extended period of time was spent with the patient and her  concerning her history her liver lack history as well as her severe anxiety when it comes to undergoing a biopsy.  I talked a significant amount of time just to talk her into the biopsy  Patient with a 2.7 cm radiographic abnormality left breast. Recommend ultrasound-guided biopsy.Patient to be scheduled for ultrasound guided biopsy of the breast in the radiology department. Risk, benefits and alternatives to this plan were thoroughly discussed with the patient who agrees to proceed.  The procedure was also thoroughly discussed as well as her follow-up. She is to see me in the office in one week but I also will call as soon as I see the pathology which is usually 4 working days from procedure.   patient with bilateral breast cancer.  Left side showing 1 mm ductal carcinoma ER/IN positive HER2/nav negative this on imaging measures 2.2 cm but on pathology only measures 1 mm of tumor.  In addition patient has an infiltrating lobular carcinoma found on the right side 12 o'clock position this measures approximately 2 cm on imaging.  This is ER positive IN HER2/nav negative. Recommend bilateral magseed  localization and lumpectomy with sentinal node biopsy verses mastectomy +/- reconstruction with sentinal node biopsy. Approximately 40 minutes spent with patient discussing her tumor, its characteristics and her treatment options. She will ultimately meet with oncology post surgery, and possibly radiation oncology depending on her surgical treatment choice and its outcome.  Risks benefits and alternatives to surgery were thoroughly discussed with the patient who agrees to proceed., Schedule at [facility].  Patient to have one more follow-up visit prior to surgery date to solidfy surgical plan and answer any questions and to review labs and xrays ordered.   Recommending MRI of the breast due to bilateral breast cancer.  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 No additional lesion seen on MRI. Patient scheduled for bilateral Magseed localization lumpectomy with bilateral sentinel node biopsies. A long discussion was held with the patient today. We together reviewed her labs and pre-operative imaging. We reviewed in detail the step by step events of how her surgical day will proceed and what she can expect that day and post-operatively. Risks , benefits and alternatives to surgery were thoroughly discussed with the patient  who agrees to proceed   Patient with a 3 cm radiographic abnormality right breast that has been confirmed invasive lobular carcinoma.  MRI without any other lesions.  Recommending bilateral Mag seed localization lumpectomy with bilateral sentinel node biopsies.  Risk benefits alternatives of doing the surgery were thoroughly discussed with the patient agrees to proceed. A long discussion was held with the patient today. We together reviewed her labs and pre-operative imaging. We reviewed in detail the step by step events of how her surgical day will proceed and what she can expect that day and post-operatively.  Risks , benefits and alternatives to surgery were thoroughly discussed with the patient who agrees to proceed  Patient returns to clinic S/P Bilateral Breast Lumpectomies with Magseed Localizations and Bilateral [place] Node Biopsies on 9/23/2024. Bilateral breast incisions healed nicely. Bilateral axillary incisions healed nicely. Patient with a seroma on the right side. Both in the breast and in the axilla. Good contour of both breasts.   Status post bilateral lumpectomies and sentinel node biopsies.  Both tumors were T2 N0 MX breast cancer.  Right side was ER positive WA HER2/nav negative left side was ER/WA positive HER2/nav negative.  Both were grade 2 out of 3.  Recommend follow-up with radiation oncology as well as oncology for discussion of further adjuvant therapy.  Patient needs to follow-up with me in 2-3 weeks for wound check.   Status post bilateral lumpectomies and sentinel node biopsies.  Both tumors were T2 N0 MX breast cancer.  Right side was ER positive WA HER2/nav negative left side was ER/WA positive HER2/nav negative.  Both were grade 2 out of 3.  Recommend follow-up with radiation oncology as well as oncology for discussion of further adjuvant therapy.  Patient needs to follow-up with me in 2-3 weeks for wound check   Patient returns to clinic for 3 week follow up appointment.  Patient  last seen in office on 10/8/2024 for S/P Bilateral Breast Lumpectomy with Magseed Localizations and Bilateral [place] Node Biopsy 9/23/2024.  Pathology results discussed with patient at 10/8/2024 appointment.  Patient found to have seroma to both Right Breast and Axilla on 10/8/2024 and was instructed to follow up for wound check.    Patient scheduled to see Dr. Benavdiez and Dr. Smith on 11/12/2024 for consultations.  Oncotype is still pending  This is patient's second postop visit.  Both breast have now healed nicely.  No evidence of seroma on the right any longer.  Minimal seroma in the right axilla.  No treatment needed.  Patient is to follow-up in 6 months.  I will be ordering her films when appropriate.    Still waiting for Oncotype prior to meeting with oncology and radiation oncology.  Left breast incision healed very nicely good contour of the breast axillary incision healed. No lymphedema  Previous Biopsy: yes Menarche Age: 14 Age of first delivery: 21 Breastfeed: no Menopause age: 50's HRT: possibly- can't remember Family history of breast cancer: No Family history of ovarian cancer: Yes Family history of pancreatic cancer: No G 3 P 2    Dayan Hawthorne MD 05/06/25 1:16 PM

## 2025-05-06 ENCOUNTER — OFFICE VISIT (OUTPATIENT)
Dept: SURGERY | Facility: CLINIC | Age: 80
End: 2025-05-06
Payer: COMMERCIAL

## 2025-05-06 VITALS
OXYGEN SATURATION: 98 % | WEIGHT: 122 LBS | DIASTOLIC BLOOD PRESSURE: 83 MMHG | RESPIRATION RATE: 17 BRPM | SYSTOLIC BLOOD PRESSURE: 165 MMHG | HEART RATE: 84 BPM | HEIGHT: 59 IN | TEMPERATURE: 98 F | BODY MASS INDEX: 24.6 KG/M2

## 2025-05-06 DIAGNOSIS — C50.911 INFILTRATING LOBULAR CARCINOMA OF RIGHT BREAST IN FEMALE: Primary | ICD-10-CM

## 2025-05-06 DIAGNOSIS — N64.89 SEROMA OF BREAST: ICD-10-CM

## 2025-05-06 DIAGNOSIS — C50.412 INFILTRATING DUCTAL CARCINOMA OF UPPER-OUTER QUADRANT OF LEFT BREAST IN FEMALE: ICD-10-CM

## 2025-05-06 PROCEDURE — 99214 OFFICE O/P EST MOD 30 MIN: CPT | Performed by: SURGERY

## 2025-05-06 PROCEDURE — 1126F AMNT PAIN NOTED NONE PRSNT: CPT | Performed by: SURGERY

## 2025-05-06 PROCEDURE — 1036F TOBACCO NON-USER: CPT | Performed by: SURGERY

## 2025-05-06 PROCEDURE — 1159F MED LIST DOCD IN RCRD: CPT | Performed by: SURGERY

## 2025-05-06 ASSESSMENT — ENCOUNTER SYMPTOMS
LOSS OF SENSATION IN FEET: 0
OCCASIONAL FEELINGS OF UNSTEADINESS: 0
DEPRESSION: 0

## 2025-05-06 ASSESSMENT — LIFESTYLE VARIABLES
HOW MANY STANDARD DRINKS CONTAINING ALCOHOL DO YOU HAVE ON A TYPICAL DAY: 1 OR 2
SKIP TO QUESTIONS 9-10: 1
HOW OFTEN DO YOU HAVE SIX OR MORE DRINKS ON ONE OCCASION: NEVER
HOW OFTEN DO YOU HAVE A DRINK CONTAINING ALCOHOL: MONTHLY OR LESS
AUDIT-C TOTAL SCORE: 1

## 2025-05-06 ASSESSMENT — PATIENT HEALTH QUESTIONNAIRE - PHQ9
2. FEELING DOWN, DEPRESSED OR HOPELESS: NOT AT ALL
1. LITTLE INTEREST OR PLEASURE IN DOING THINGS: NOT AT ALL
SUM OF ALL RESPONSES TO PHQ9 QUESTIONS 1 & 2: 0

## 2025-05-06 ASSESSMENT — PAIN SCALES - GENERAL: PAINLEVEL_OUTOF10: 0-NO PAIN

## 2025-05-06 NOTE — ASSESSMENT & PLAN NOTE
Patient slowly healing from radiation therapy.  Breast exam normal without any masses.  Patient to have bilateral mammogram after she is 6 months out from radiation which will put us in October.  Patient had repeat exam by me at that time.  Patient indicates she did not tolerate her letrozole.  Joint pain.  Recommend she follow-up with oncology for discussion of potentially different medication.

## 2025-05-06 NOTE — ASSESSMENT & PLAN NOTE
Patient with a seroma associated with the right axillary incision.  Offered aspiration today.  Patient [place] it does not cause discomfort.  Will treat conservatively and reassess with her next visit in 6 months

## 2025-05-30 ENCOUNTER — LAB (OUTPATIENT)
Dept: LAB | Facility: HOSPITAL | Age: 80
End: 2025-05-30
Payer: COMMERCIAL

## 2025-05-30 DIAGNOSIS — C50.912 ADENOCARCINOMA OF LEFT BREAST: ICD-10-CM

## 2025-05-30 LAB
25(OH)D3 SERPL-MCNC: 44 NG/ML (ref 30–100)
ALBUMIN SERPL BCP-MCNC: 4.6 G/DL (ref 3.4–5)
ALP SERPL-CCNC: 43 U/L (ref 33–136)
ALT SERPL W P-5'-P-CCNC: 12 U/L (ref 7–45)
ANION GAP SERPL CALC-SCNC: 10 MMOL/L (ref 10–20)
AST SERPL W P-5'-P-CCNC: 18 U/L (ref 9–39)
BASOPHILS # BLD AUTO: 0.03 X10*3/UL (ref 0–0.1)
BASOPHILS NFR BLD AUTO: 0.7 %
BILIRUB SERPL-MCNC: 0.4 MG/DL (ref 0–1.2)
BUN SERPL-MCNC: 11 MG/DL (ref 6–23)
CALCIUM SERPL-MCNC: 9.5 MG/DL (ref 8.6–10.3)
CHLORIDE SERPL-SCNC: 103 MMOL/L (ref 98–107)
CO2 SERPL-SCNC: 30 MMOL/L (ref 21–32)
CREAT SERPL-MCNC: 0.72 MG/DL (ref 0.5–1.05)
EGFRCR SERPLBLD CKD-EPI 2021: 86 ML/MIN/1.73M*2
EOSINOPHIL # BLD AUTO: 0.37 X10*3/UL (ref 0–0.4)
EOSINOPHIL NFR BLD AUTO: 8.8 %
ERYTHROCYTE [DISTWIDTH] IN BLOOD BY AUTOMATED COUNT: 13.9 % (ref 11.5–14.5)
FERRITIN SERPL-MCNC: 83 NG/ML (ref 8–150)
GLUCOSE SERPL-MCNC: 102 MG/DL (ref 74–99)
HCT VFR BLD AUTO: 40.8 % (ref 36–46)
HGB BLD-MCNC: 13.4 G/DL (ref 12–16)
IMM GRANULOCYTES # BLD AUTO: 0.01 X10*3/UL (ref 0–0.5)
IMM GRANULOCYTES NFR BLD AUTO: 0.2 % (ref 0–0.9)
IRON SATN MFR SERPL: 19 % (ref 25–45)
IRON SERPL-MCNC: 59 UG/DL (ref 35–150)
LYMPHOCYTES # BLD AUTO: 1.09 X10*3/UL (ref 0.8–3)
LYMPHOCYTES NFR BLD AUTO: 25.9 %
MCH RBC QN AUTO: 29.1 PG (ref 26–34)
MCHC RBC AUTO-ENTMCNC: 32.8 G/DL (ref 32–36)
MCV RBC AUTO: 89 FL (ref 80–100)
MONOCYTES # BLD AUTO: 0.55 X10*3/UL (ref 0.05–0.8)
MONOCYTES NFR BLD AUTO: 13.1 %
NEUTROPHILS # BLD AUTO: 2.16 X10*3/UL (ref 1.6–5.5)
NEUTROPHILS NFR BLD AUTO: 51.3 %
PLATELET # BLD AUTO: 203 X10*3/UL (ref 150–450)
POTASSIUM SERPL-SCNC: 3.6 MMOL/L (ref 3.5–5.3)
PROT SERPL-MCNC: 7.3 G/DL (ref 6.4–8.2)
RBC # BLD AUTO: 4.61 X10*6/UL (ref 4–5.2)
SODIUM SERPL-SCNC: 139 MMOL/L (ref 136–145)
TIBC SERPL-MCNC: 305 UG/DL (ref 240–445)
UIBC SERPL-MCNC: 246 UG/DL (ref 110–370)
VIT B12 SERPL-MCNC: 634 PG/ML (ref 211–911)
WBC # BLD AUTO: 4.2 X10*3/UL (ref 4.4–11.3)

## 2025-05-30 PROCEDURE — 80053 COMPREHEN METABOLIC PANEL: CPT

## 2025-05-30 PROCEDURE — 82607 VITAMIN B-12: CPT | Mod: PORLAB

## 2025-05-30 PROCEDURE — 82306 VITAMIN D 25 HYDROXY: CPT | Mod: PORLAB

## 2025-05-30 PROCEDURE — 85025 COMPLETE CBC W/AUTO DIFF WBC: CPT

## 2025-05-30 PROCEDURE — 82728 ASSAY OF FERRITIN: CPT

## 2025-05-30 PROCEDURE — 83540 ASSAY OF IRON: CPT

## 2025-05-30 PROCEDURE — 36415 COLL VENOUS BLD VENIPUNCTURE: CPT

## 2025-06-05 ENCOUNTER — OFFICE VISIT (OUTPATIENT)
Dept: HEMATOLOGY/ONCOLOGY | Facility: CLINIC | Age: 80
End: 2025-06-05
Payer: COMMERCIAL

## 2025-06-05 VITALS
OXYGEN SATURATION: 98 % | SYSTOLIC BLOOD PRESSURE: 151 MMHG | RESPIRATION RATE: 18 BRPM | WEIGHT: 119.49 LBS | TEMPERATURE: 98.6 F | HEART RATE: 93 BPM | BODY MASS INDEX: 24.28 KG/M2 | DIASTOLIC BLOOD PRESSURE: 84 MMHG

## 2025-06-05 DIAGNOSIS — C50.912 ADENOCARCINOMA OF LEFT BREAST: ICD-10-CM

## 2025-06-05 PROCEDURE — 99215 OFFICE O/P EST HI 40 MIN: CPT | Performed by: INTERNAL MEDICINE

## 2025-06-05 PROCEDURE — 1126F AMNT PAIN NOTED NONE PRSNT: CPT | Performed by: INTERNAL MEDICINE

## 2025-06-05 PROCEDURE — 1036F TOBACCO NON-USER: CPT | Performed by: INTERNAL MEDICINE

## 2025-06-05 PROCEDURE — 1160F RVW MEDS BY RX/DR IN RCRD: CPT | Performed by: INTERNAL MEDICINE

## 2025-06-05 PROCEDURE — 1159F MED LIST DOCD IN RCRD: CPT | Performed by: INTERNAL MEDICINE

## 2025-06-05 RX ORDER — TAMOXIFEN CITRATE 20 MG/1
20 TABLET ORAL DAILY
Qty: 60 TABLET | Refills: 11 | Status: SHIPPED | OUTPATIENT
Start: 2025-06-05 | End: 2027-05-26

## 2025-06-05 ASSESSMENT — PAIN SCALES - GENERAL: PAINLEVEL_OUTOF10: 0-NO PAIN

## 2025-06-05 NOTE — PROGRESS NOTES
Pt seen in office today for a follow up visit with Dr. Maciej Smith for management of her breast cancer.  She is  without complaints today and denies pain.     Medications, pharmacy preference and allergies were reviewed with patient and updated in the medical record by MD.     Per orders, labs were obtained on 5/30/25 and results are to be reviewed in office today by MD with patient.She is to RTC in December for a FUV with YOLANDE Aragon with labs prior to her visit.    Our contact information was given to patient and they were encouraged to contact us with any questions or concerns.    Patient verbalized understanding and agreement regarding discussed information via verbal feedback. Pt escorted to scheduling.

## 2025-06-05 NOTE — PROGRESS NOTES
Patient ID: Ayana Hernandez is a 80 y.o. female.  Referring Physician: Maciej Smith MD  [address]  [address] [place] [address]  Primary Care Provider: West Perez MD  Referral Reason: bilateral breast cancer    Subjective:  No complaints    Heme/Onc History:  Ms. Hernandez is a 79 y.o. woman who palpated a mass in the right breast approximately 1 year ago, which subsequently increased in size around July of 2024. Of note, previous mammogram on 9/28/2023 showed bilaterally dense breast tissue, without mammographic evidence of malignancy. Targeted ultrasound and diagnostic mammogram on 8/2/2024 showed a 1.5 x 3.2 cm mass in the right breast at the 12 o'clock position, 4 cm from the nipple. Incidentally found on mammogram in the left breast, was an area of new calcifications. Ultrasound of the area on the left showed an area of calcifications which measured 1.2 x 2.0 x 2.7 cm at the 1 o'clock position, 7 cm from the nipple. No abnormal-appearing lymph nodes were seen in either axilla. Ultrasound-guided biopsy of the masses in the right and left breasts was performed on 8/8/2024, with pathology from the right breast positive for invasive lobular carcinoma, grade 2, ER positive, GA and HER2/nav negative. In the left breast, pathology was positive for invasive ductal carcinoma, ER/GA positive, HER2/nav equivocal, negative on FISH.  Breast MRI was performed on 9/9/2024, and showed an irregular, enhancing, and spiculated mass in the central superior right breast, 3.1 x 1.7 x 2.9 cm in size. In the left breast, there was an irregular enhancing mass in the superior/lateral aspect, which measured 1.5 x 1.2 x 1.6 cm. There was no lymphadenopathy seen bilaterally. She then underwent a bilateral breast lumpectomy and sentinel node biopsy on 9/23/2024. In the right breast, there was a 3.6 cm invasive lobular carcinoma, grade 2, with negative margins (closest was 0.5 mm, anterior). There was no LVI. There was 1 sentinel  node removed, which was negative for metastatic carcinoma. In the left breast, there was a 2.4 cm invasive ductal carcinoma, grade 2, with negative margins (closest was 1.46 mm). There was no LVI. There were 2 sentinel nodes removed, which were all negative for metastatic carcinoma. DCIS component was removed as well, grade 2, with negative margins (closest was greater than 2 mm). Her Oncotype score was 28.      Past Medical History:   Past Medical History:   Diagnosis Date    Adverse effect of anesthesia     ALMOST FAINTED AFTER GETTING UP TO BATHROOM. LOW BP    Delayed emergence from general anesthesia     Encounter for screening mammogram for malignant neoplasm of breast     Visit for screening mammogram    Hypertension     Personal history of other diseases of the musculoskeletal system and connective tissue     History of osteopenia    PONV (postoperative nausea and vomiting)     Thyroid disease     low     Social History:   Social History     Socioeconomic History    Marital status:      Spouse name: Not on file    Number of children: Not on file    Years of education: Not on file    Highest education level: Not on file   Occupational History    Not on file   Tobacco Use    Smoking status: Former     Current packs/day: 0.00     Average packs/day: 1 pack/day for 29.0 years (29.0 ttl pk-yrs)     Types: Cigarettes     Start date: 1962     Quit date: 1991     Years since quittin.4     Passive exposure: Past    Smokeless tobacco: Never   Vaping Use    Vaping status: Never Used   Substance and Sexual Activity    Alcohol use: Not Currently     Comment: ONE MONTH AGO LAST DRINK    Drug use: Never    Sexual activity: Defer   Other Topics Concern    Not on file   Social History Narrative    Not on file     Social Drivers of Health     Financial Resource Strain: Low Risk  (2024)    Overall Financial Resource Strain (CARDIA)     Difficulty of Paying Living Expenses: Not hard at all   Food  Insecurity: No Food Insecurity (11/12/2024)    Hunger Vital Sign     Worried About Running Out of Food in the Last Year: Never true     Ran Out of Food in the Last Year: Never true   Transportation Needs: No Transportation Needs (11/12/2024)    PRAPARE - Transportation     Lack of Transportation (Medical): No     Lack of Transportation (Non-Medical): No   Physical Activity: Inactive (11/12/2024)    Exercise Vital Sign     Days of Exercise per Week: 0 days     Minutes of Exercise per Session: 0 min   Stress: No Stress Concern Present (11/12/2024)    [employer] - Occupational Stress Questionnaire     Feeling of Stress : Not at all   Social Connections: Unknown (11/12/2024)    Social Connection and Isolation Panel [NHANES]     Frequency of Communication with Friends and Family: Three times a week     Frequency of Social Gatherings with Friends and Family: Three times a week     Attends Jewish Services: Patient declined     Active Member of Clubs or Organizations: Patient declined     Attends Club or Organization Meetings: Patient declined     Marital Status:    Intimate Partner Violence: Not At Risk (11/12/2024)    Humiliation, Afraid, Rape, and Kick questionnaire     Fear of Current or Ex-Partner: No     Emotionally Abused: No     Physically Abused: No     Sexually Abused: No   Housing Stability: Low Risk  (11/12/2024)    Housing Stability Vital Sign     Unable to Pay for Housing in the Last Year: No     Number of Times Moved in the Last Year: 0     Homeless in the Last Year: No     Surgical History:   Past Surgical History:   Procedure Laterality Date    ABSCESS DRAINAGE Right     breast drained by dr. almeida    BREAST BIOPSY Bilateral 08/08/2024    BREAST LUMPECTOMY Right 2000    benign breast lump    BREAST LUMPECTOMY W/ NEEDLE LOCALIZATION Bilateral 09/23/2024    COLONOSCOPY  05/20/2013    Complete Colonoscopy    KNEE SURGERY  05/20/2013    Knee Surgery Left    KNEE SURGERY   05/20/2013    Knee Surgery Right    NOSE SURGERY      TONSILLECTOMY  05/20/2013    Tonsillectomy     Family History:   Family History   Problem Relation Name Age of Onset    Lung cancer Mother      Lung cancer Father        reports that she quit smoking about 34 years ago. Her smoking use included cigarettes. She started smoking about 63 years ago. She has a 29 pack-year smoking history. She has been exposed to tobacco smoke. She has never used smokeless tobacco.  Oncology Family history: Cancer-related family history includes Lung cancer in her father and mother.    Review Of Systems:  As stated per in HPI; otherwise all other 12 point ROS are negative    Physical Exam:  /84 (BP Location: Left arm, Patient Position: Sitting, BP Cuff Size: Small adult)   Pulse 93   Temp 37 °C (98.6 °F) (Temporal)   Resp 18   Wt 54.2 kg (119 lb 7.8 oz)   SpO2 98%   BMI 24.28 kg/m²   BSA: 1.5 meters squared      Results:  Diagnostic Results   Lab Results   Component Value Date    WBC 4.2 (L) 05/30/2025    HGB 13.4 05/30/2025    HCT 40.8 05/30/2025    MCV 89 05/30/2025     05/30/2025     Lab Results   Component Value Date    CALCIUM 9.5 05/30/2025     05/30/2025    K 3.6 05/30/2025    CO2 30 05/30/2025     05/30/2025    BUN 11 05/30/2025    CREATININE 0.72 05/30/2025    ALT 12 05/30/2025    AST 18 05/30/2025       Current Outpatient Medications:     acetaminophen (Tylenol) 500 mg tablet, Take 1 tablet (500 mg) by mouth every 6 hours if needed for mild pain (1 - 3)., Disp: , Rfl:     alpha tocopherol (Vitamin E) 268 mg (400 unit) capsule, 1 capsule (400 Units) once every 24 hours., Disp: , Rfl:     amLODIPine (Norvasc) 5 mg tablet, Take 1 tablet (5 mg) by mouth once daily., Disp: , Rfl:     b complex 0.4 mg tablet, Take 1 tablet by mouth 1 (one) time per week in the early morning.., Disp: , Rfl:     letrozole (Femara) 2.5 mg tablet, Take 1 tablet (2.5 mg total) by mouth once daily.  Take with or without  food. (Patient not taking: Reported on 4/17/2025), Disp: 60 tablet, Rfl: 5    levothyroxine (Synthroid, Levoxyl) 50 mcg tablet, Take 1 tablet (50 mcg) by mouth early in the morning.., Disp: , Rfl:     mv,Ca,min-iron-FA-lutein 18 mg iron-400 mcg-6 mg tablet, Take 1 tablet by mouth once daily. (Patient not taking: Reported on 3/13/2025), Disp: , Rfl:     nystatin (Mycostatin) 100,000 unit/gram powder, Apply 1 Application topically 2 times a day., Disp: 30 g, Rfl: 0    omega 3-dha-epa-fish oil (Fish OiL) 1,000 mg (120 mg-180 mg) capsule, Take 2 capsules (2,000 mg) by mouth 2 times a day., Disp: , Rfl:      Assessment/Plan:  ? Bilateral Breast Cancer:    Ayana Hernandez is a 79 y.o. female with IDC of left breast, Stage IA (pT2, pN0(sn), cM0, G2, ER+, MS+, HER2-) and ILC of right breast, Stage IIA (pT2, pN0(sn), cM0, G2, ER+, MS-, HER2-, Oncotype DX score: 28) s/p lumpectomies in 09/2024.    Oncotype from right breast is is 24.    Adjuvant TC (%25 dose reduced per patient request) with Neulasta On pro support is planned followed by AI 5-10 years plus Ribociclib for 3 years (Tumor 2-5 cm, N0, Grade 2 with high genomic risk) is recommended.    Ambry negative.    C#1 TC given on 11/22/24. She got %75 of total dose per her request. Tolerated very well  C#2 TC given on 12/13/24. She got %80 of total dose but had more fatigue and constipation  C#3 TC given on 01/10/25. She wants to get %75 of the total dose. She is very scared about getting %80 of the total dose.  C#4 TC is given on 01/31/25. Labs from 01/30/25 is normal    Adjuvant breast RT was complete on 04/22/25.  03/2025: she was started on Letrozole to be used 5-10 years (preferably 10 years due to bilateral disease). I discussed Ribociclib in addition to AI but she declined it.    06/5/25: she stopped using letrozole due to arthralgia hands after using it for a few weeks. We will try Tamoxifen.    2- Bone health: Baseline DEXA scan in 10/24 is WNL. Adjuvant Zometa q  6 months for 3-5 years after completion of chemotx is recommended to improve DFS. Dose #2 planned for 08/25.    RTC in 6 months for toxicity check    Diagnoses and all orders for this visit:  Adenocarcinoma of left breast  -     Clinic Appointment Request       Performance Status: Asymptomatic    I spent more than 60 minutes for the patient today, including face-to-face conversation, pre-visit preparation, post-visit orders, and others.   Maciej Smith MD

## 2025-06-16 ENCOUNTER — APPOINTMENT (OUTPATIENT)
Dept: RADIATION ONCOLOGY | Facility: CLINIC | Age: 80
End: 2025-06-16
Payer: COMMERCIAL

## 2025-06-20 ENCOUNTER — HOSPITAL ENCOUNTER (OUTPATIENT)
Dept: RADIATION ONCOLOGY | Facility: CLINIC | Age: 80
Setting detail: RADIATION/ONCOLOGY SERIES
Discharge: HOME | End: 2025-06-20
Payer: COMMERCIAL

## 2025-06-20 VITALS
RESPIRATION RATE: 18 BRPM | HEART RATE: 117 BPM | SYSTOLIC BLOOD PRESSURE: 144 MMHG | DIASTOLIC BLOOD PRESSURE: 76 MMHG | TEMPERATURE: 97.5 F | WEIGHT: 118.39 LBS | BODY MASS INDEX: 24.06 KG/M2 | OXYGEN SATURATION: 96 %

## 2025-06-20 DIAGNOSIS — N63.15 MASS OVERLAPPING MULTIPLE QUADRANTS OF RIGHT BREAST: ICD-10-CM

## 2025-06-20 PROCEDURE — 99211 OFF/OP EST MAY X REQ PHY/QHP: CPT | Performed by: STUDENT IN AN ORGANIZED HEALTH CARE EDUCATION/TRAINING PROGRAM

## 2025-06-20 ASSESSMENT — PAIN SCALES - GENERAL: PAINLEVEL_OUTOF10: 0-NO PAIN

## 2025-06-20 ASSESSMENT — ENCOUNTER SYMPTOMS
OCCASIONAL FEELINGS OF UNSTEADINESS: 0
DEPRESSION: 0
LOSS OF SENSATION IN FEET: 0

## 2025-06-20 NOTE — PROGRESS NOTES
Radiation Oncology Nursing Note    Pain: The patient's current pain level was assessed.  They report currently having a pain of 0 out of 10.  They feel their pain is under control without the use of pain medications.    Review of Systems:  Review of Systems - Oncology    Education Documentation  When and How to Contact Clinic, taught by Delroy Holden RN at 6/20/2025 12:16 PM.  Learner: Patient  Readiness: Acceptance  Method: Explanation  Response: Verbalizes Understanding    Education Comments  No comments found.      Patient will follow up with surgeon and medical oncology, will call this office as necessary.

## 2025-06-20 NOTE — PROGRESS NOTES
Radiation Oncology Follow-Up    Patient Name:  Ayana Hernandez  MRN:  [id number]  :  1945    Referring Provider: Maya Benavidez DO  Primary Care Provider: West Perez MD  Care Team: Patient Care Team:  West Perez MD as PCP - General (Family Medicine)  West Perez MD as Primary Care Provider  Lara Auguste MD as Obstetrician (Obstetrics and Gynecology)  Maciej Smith MD as Medical Oncologist (Hematology and Oncology)  Lara Huang, RN as Nurse Navigator (Hematology and Oncology)  Maya Benavidez DO as Radiation Oncologist (Radiation Oncology)  Pierre Waller RN as Registered Nurse (Radiation Oncology)    Date of Service: 2025    Diagnosis:  Infiltrating 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, Pathologic: Stage IA (pT2, pN0(sn), cM0, G2, ER+, NV+, HER2-, Oncotype DX score: 28)  Infiltrating 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, Clinical: Stage IB (cT2, cN0, cM0, G2, ER+, NV+, HER2-)    Infiltrating lobular carcinoma of right breast in female, Clinical: Stage IIA (cT2, cN0, cM0, G2, ER+, NV-, HER2-)  Infiltrating lobular carcinoma of right breast in female, Pathologic: Stage IIA (pT2, pN0(sn), cM0, G2, ER+, NV-, HER2-, Oncotype DX score: 24)    Previous treatment:  2024: bilateral breast lumpectomy and sentinel node biopsy  2025: Completed 4 cycles of adjuvant TC  2025: Completed bilateral breast ultra hypofractionated radiation    History of Present Illness:  Ms. Hernandez is a 80 y.o. woman, who returns to radiation oncology clinic today for follow-up after completing bilateral breast radiation in April, as below. She started letrozole shortly after completing radiation, though was switched to tamoxifen due to arthralgias and has been tolerating better. She denies any breast pain or discomfort. She denies any breast masses or lesions. She had some mild dermatitis post treatment, which has largely resolved, though has  continued to use daily skin moisturizers.      Treatment Rendered:   Other Treatments    Treatment Period Technique Fraction Dose Fractions Total Dose   Course 1 4/16/2025-4/22/2025  (days elapsed: 6)         L breast 4/16/2025-4/22/2025 3D 520 / 520 cGy 5 / 5 2,600 / 2,600 cGy         R breast 4/16/2025-4/22/2025 3D 520 / 520 cGy 5 / 5 2,600 / 2,600 cGy       Review of Systems:   Review of Systems - Oncology    Performance Status:   The Karnofsky performance scale today is 90, Able to carry on normal activity; minor signs or symptoms of disease (ECOG equivalent 0).       OBJECTIVE  Vital Signs:  /76   Pulse (!) 117   Temp 36.4 °C (97.5 °F) (Temporal)   Resp 18   Wt 53.7 kg (118 lb 6.2 oz)   SpO2 96%   BMI 24.06 kg/m²   Physical Exam  Vitals reviewed.   Constitutional:       General: She is not in acute distress.     Appearance: Normal appearance. She is not ill-appearing.   HENT:      Head: Normocephalic and atraumatic.      Mouth/Throat:      Mouth: Mucous membranes are moist.   Eyes:      Conjunctiva/sclera: Conjunctivae normal.   Cardiovascular:      Rate and Rhythm: Normal rate.   Pulmonary:      Effort: Pulmonary effort is normal.   Chest:      Comments: The breasts were examined in the supine and upright positions, and are overall symmetrical in appearance. The bilateral breasts have a well-healed lumpectomy scars with some mild underlying fibrosis. There is faint hyperpigmentation of the breast bilaterally, without desquamation. Otherwise, the remainder of the breast exam exhibits normal breast tissue in the right and left breasts, without any discrete firmness, nodularity, or lesions to palpation. There is no other tenderness or palpable masses. The overlying skin is otherwise normal. There is no appreciable axillary lymphadenopathy in the right or left axilla.   Abdominal:      General: There is no distension.   Musculoskeletal:         General: Normal range of motion.   Skin:     General: Skin  is warm and dry.   Neurological:      Mental Status: She is alert and oriented to person, place, and time.   Psychiatric:         Mood and Affect: Mood normal.         Behavior: Behavior normal.            ASSESSMENT:   Ms. Hernandez is a 80 y.o. 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, Pathologic: Stage IA (pT2, pN0(sn), cM0, G2, ER+, OR+, HER2-, Oncotype DX score: 28)  Infiltrating 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, Clinical: Stage IB (cT2, cN0, cM0, G2, ER+, OR+, HER2-)    Infiltrating lobular carcinoma of right breast in female, Clinical: Stage IIA (cT2, cN0, cM0, G2, ER+, OR-, HER2-)  Infiltrating lobular carcinoma of right breast in female, Pathologic: Stage IIA (pT2, pN0(sn), cM0, G2, ER+, OR-, HER2-, Oncotype DX score: 24), status post bilateral lumpectomy and sentinel node biopsy, followed by adjuvant TC chemotherapy, and now status post bilateral ultra hypofractionated whole breast radiation. She tolerated treatment well, with only mild posttreatment dermatitis which has largely resolved.    PLAN: She will continue on her tamoxifen, and will have close follow-up with her medical oncologist. She also has follow-up already scheduled with her surgeon, who will coordinate her mammograms. Given her excellent response to treatment and recovery, we discussed that she may continue to follow-up with just her medical oncologist and surgeon going forward, and return to see us only on an as-needed basis in the future. She was in agreement with this plan, though is certainly welcome to contact us at any point if any additional concerns arise     NCCN Guidelines were applicable to guide this patients treatment plan.  Maya Benavidez DO

## 2025-08-12 ENCOUNTER — TELEPHONE (OUTPATIENT)
Dept: HEMATOLOGY/ONCOLOGY | Facility: CLINIC | Age: 80
End: 2025-08-12
Payer: COMMERCIAL

## 2025-08-15 ENCOUNTER — TELEPHONE (OUTPATIENT)
Dept: HEMATOLOGY/ONCOLOGY | Facility: CLINIC | Age: 80
End: 2025-08-15
Payer: COMMERCIAL

## 2025-08-15 DIAGNOSIS — C50.912 ADENOCARCINOMA OF LEFT BREAST: ICD-10-CM

## 2025-08-15 RX ORDER — TAMOXIFEN CITRATE 20 MG/1
20 TABLET ORAL DAILY
Qty: 90 TABLET | Refills: 3 | Status: SHIPPED | OUTPATIENT
Start: 2025-08-15 | End: 2027-08-05

## 2025-08-25 ENCOUNTER — LAB (OUTPATIENT)
Dept: LAB | Facility: HOSPITAL | Age: 80
End: 2025-08-25
Payer: COMMERCIAL

## 2025-08-25 DIAGNOSIS — C50.912 ADENOCARCINOMA OF LEFT BREAST: ICD-10-CM

## 2025-08-25 LAB
ALBUMIN SERPL BCP-MCNC: 4.4 G/DL (ref 3.4–5)
ALP SERPL-CCNC: 32 U/L (ref 33–136)
ALT SERPL W P-5'-P-CCNC: 10 U/L (ref 7–45)
ANION GAP SERPL CALC-SCNC: 11 MMOL/L (ref 10–20)
AST SERPL W P-5'-P-CCNC: 17 U/L (ref 9–39)
BILIRUB SERPL-MCNC: 0.4 MG/DL (ref 0–1.2)
BUN SERPL-MCNC: 17 MG/DL (ref 6–23)
CALCIUM SERPL-MCNC: 9.1 MG/DL (ref 8.6–10.3)
CHLORIDE SERPL-SCNC: 104 MMOL/L (ref 98–107)
CO2 SERPL-SCNC: 29 MMOL/L (ref 21–32)
CREAT SERPL-MCNC: 0.8 MG/DL (ref 0.5–1.05)
EGFRCR SERPLBLD CKD-EPI 2021: 76 ML/MIN/1.73M*2
GLUCOSE SERPL-MCNC: 84 MG/DL (ref 74–99)
MAGNESIUM SERPL-MCNC: 2.08 MG/DL (ref 1.6–2.4)
PHOSPHATE SERPL-MCNC: 2.5 MG/DL (ref 2.5–4.9)
POTASSIUM SERPL-SCNC: 3.7 MMOL/L (ref 3.5–5.3)
PROT SERPL-MCNC: 7 G/DL (ref 6.4–8.2)
SODIUM SERPL-SCNC: 140 MMOL/L (ref 136–145)

## 2025-08-25 PROCEDURE — 83735 ASSAY OF MAGNESIUM: CPT

## 2025-08-25 PROCEDURE — 84100 ASSAY OF PHOSPHORUS: CPT

## 2025-08-25 PROCEDURE — 36415 COLL VENOUS BLD VENIPUNCTURE: CPT

## 2025-08-25 PROCEDURE — 84075 ASSAY ALKALINE PHOSPHATASE: CPT

## 2025-08-27 ENCOUNTER — APPOINTMENT (OUTPATIENT)
Dept: HEMATOLOGY/ONCOLOGY | Facility: CLINIC | Age: 80
End: 2025-08-27
Payer: COMMERCIAL

## 2025-12-03 ENCOUNTER — APPOINTMENT (OUTPATIENT)
Dept: HEMATOLOGY/ONCOLOGY | Facility: CLINIC | Age: 80
End: 2025-12-03
Payer: COMMERCIAL

## (undated) DEVICE — TIP, SUCTION, YANKAUER, BULB, ADULT

## (undated) DEVICE — SUTURE, SILK, 2-0, 30 IN, SH, BLACK

## (undated) DEVICE — SLEEVE, VASO PRESS, CALF GARMENT, MEDIUM, GREEN

## (undated) DEVICE — THUNDERBEAT, 5MM, 10CM, INLINE GRIP

## (undated) DEVICE — SUTURE, MONOCRYL, 4-0, 27 IN, PS-2, UNDYED

## (undated) DEVICE — BLADE, SURGICAL, POLYMER COATED P15, STERILE, DISP

## (undated) DEVICE — WATER STERILE, FOR IRRIGATION, 1000ML, W/HANGER

## (undated) DEVICE — SOLUTION, IRRIGATION, STERILE WATER, 1000 ML, POUR BOTTLE

## (undated) DEVICE — DRAPE, TAPE, ORTHO

## (undated) DEVICE — SUTURE, VICRYL, 3-0, 27 IN, SH, VIOLET

## (undated) DEVICE — GLOVE, SURGICAL, PROTEXIS PI , 6.5, PF, LF

## (undated) DEVICE — STRIP, SKIN CLOSURE, STERI STRIP, REINFORCED, 0.5 X 4 IN

## (undated) DEVICE — Device

## (undated) DEVICE — PROBE COVER, INTRAOPERATIVE, 13 X 244CM (5 X 96IN)

## (undated) DEVICE — ELECTRODE, ELECTROSURGICAL, BLADE, INSULATED, ENT/IMA, STERILE

## (undated) DEVICE — VESSEL LOOP, RED MAXI, 2 CARD

## (undated) DEVICE — DRAPE, LEGGINGS, 28.5 X 43 IN, DISPOSABLE, LF, STERILE

## (undated) DEVICE — SUTURE, VICRYL, 2-0, 27 IN, SH, UNDYED

## (undated) DEVICE — CAUTERY, PENCIL, PUSH BUTTON, SMOKE EVAC, 70MM

## (undated) DEVICE — APPLIER, MULTIPLE CLIP, LIGACLIP, W/20 MEDIUM, 9 3/8 APPLIER

## (undated) DEVICE — SUTURE, MONOCRYL PLUS, 4-0, PS-2 UD 27IN

## (undated) DEVICE — SUTURE, VICRYL, 3-0, 27 IN, SH

## (undated) DEVICE — APPLICATOR, CHLORAPREP, W/ORANGE TINT, 26ML

## (undated) DEVICE — STRIP, SKIN CLOSURE, COMPOUND BENZION TINCTURE 0.6ML